# Patient Record
Sex: FEMALE | Race: WHITE | NOT HISPANIC OR LATINO | Employment: OTHER | URBAN - METROPOLITAN AREA
[De-identification: names, ages, dates, MRNs, and addresses within clinical notes are randomized per-mention and may not be internally consistent; named-entity substitution may affect disease eponyms.]

---

## 2023-04-26 ENCOUNTER — OFFICE VISIT (OUTPATIENT)
Dept: PRIMARY CARE | Facility: CLINIC | Age: 73
End: 2023-04-26
Payer: MEDICARE

## 2023-04-26 VITALS
SYSTOLIC BLOOD PRESSURE: 120 MMHG | TEMPERATURE: 96.9 F | WEIGHT: 136.4 LBS | DIASTOLIC BLOOD PRESSURE: 72 MMHG | BODY MASS INDEX: 24.55 KG/M2

## 2023-04-26 DIAGNOSIS — M25.551 HIP PAIN, RIGHT: ICD-10-CM

## 2023-04-26 DIAGNOSIS — M54.32 SCIATICA OF LEFT SIDE: Primary | ICD-10-CM

## 2023-04-26 DIAGNOSIS — S32.10XS CLOSED FRACTURE OF SACRUM, UNSPECIFIED PORTION OF SACRUM, SEQUELA: ICD-10-CM

## 2023-04-26 PROCEDURE — 99213 OFFICE O/P EST LOW 20 MIN: CPT | Performed by: INTERNAL MEDICINE

## 2023-04-26 PROCEDURE — 1036F TOBACCO NON-USER: CPT | Performed by: INTERNAL MEDICINE

## 2023-04-26 PROCEDURE — 1160F RVW MEDS BY RX/DR IN RCRD: CPT | Performed by: INTERNAL MEDICINE

## 2023-04-26 PROCEDURE — 1159F MED LIST DOCD IN RCRD: CPT | Performed by: INTERNAL MEDICINE

## 2023-04-26 RX ORDER — PHENOL 1.4 %
1 AEROSOL, SPRAY (ML) MUCOUS MEMBRANE DAILY
COMMUNITY
Start: 2019-08-27

## 2023-04-26 RX ORDER — VIT C/E/ZN/COPPR/LUTEIN/ZEAXAN 250MG-90MG
1 CAPSULE ORAL DAILY
COMMUNITY
Start: 2019-02-11

## 2023-04-26 RX ORDER — FLUTICASONE PROPIONATE 50 MCG
SPRAY, SUSPENSION (ML) NASAL DAILY PRN
COMMUNITY
Start: 2013-09-20

## 2023-04-26 NOTE — PROGRESS NOTES
Subjective   Patient ID: Lauren Fields is a 73 y.o. female who presents for Hip Pain.    HPI   Ongoing issues with pain left low back left leg as well as right hip pain.  Working with orthopedist who feels she has significant arthritis in the hip.  Improved with corticosteroid injection in the hip but only lasted for less than a week.  Hip orthopedic suggest waiting 1 year for follow-up, no surgery at this point.  Ongoing issues with left leg sciatic type pain.  Perhaps some weakness developing.  Back surgeon feels right hip pain is actually related to her back.  Recommending surgery.  She has questions regarding this.  Review of Systems    Objective   /72   Temp 36.1 °C (96.9 °F)   Wt 61.9 kg (136 lb 6.4 oz)   BMI 24.55 kg/m²     Physical Exam    Assessment/Plan     We reviewed hip versus back pain.  Reviewed indications for surgical intervention.  Discussed pros and cons.  Answered multiple questions.  She will follow-up with back surgeon and orthopedist.

## 2023-04-27 PROBLEM — M85.80 OSTEOPENIA: Status: ACTIVE | Noted: 2023-04-27

## 2023-04-27 PROBLEM — M54.30 SCIATICA: Status: ACTIVE | Noted: 2023-04-27

## 2023-04-27 PROBLEM — D75.89 MACROCYTOSIS: Status: ACTIVE | Noted: 2023-04-27

## 2023-04-27 PROBLEM — S32.10XA SACRAL FRACTURE (MULTI): Status: ACTIVE | Noted: 2023-04-27

## 2023-04-27 PROBLEM — R92.8 ABNORMAL MAMMOGRAM: Status: ACTIVE | Noted: 2023-04-27

## 2023-04-27 PROBLEM — M25.551 HIP PAIN, RIGHT: Status: ACTIVE | Noted: 2023-04-27

## 2023-04-27 PROBLEM — D64.9 ANEMIA: Status: ACTIVE | Noted: 2023-04-27

## 2023-04-27 PROBLEM — E78.5 HYPERLIPIDEMIA: Status: ACTIVE | Noted: 2023-04-27

## 2023-04-27 PROBLEM — E55.9 VITAMIN D DEFICIENCY: Status: ACTIVE | Noted: 2023-04-27

## 2023-08-16 ENCOUNTER — LAB (OUTPATIENT)
Dept: LAB | Facility: LAB | Age: 73
End: 2023-08-16
Payer: MEDICARE

## 2023-08-16 ENCOUNTER — OFFICE VISIT (OUTPATIENT)
Dept: PRIMARY CARE | Facility: CLINIC | Age: 73
End: 2023-08-16
Payer: MEDICARE

## 2023-08-16 VITALS
HEIGHT: 63 IN | SYSTOLIC BLOOD PRESSURE: 104 MMHG | DIASTOLIC BLOOD PRESSURE: 68 MMHG | WEIGHT: 133.8 LBS | BODY MASS INDEX: 23.71 KG/M2

## 2023-08-16 DIAGNOSIS — Z00.00 ROUTINE CHECK-UP: Primary | ICD-10-CM

## 2023-08-16 DIAGNOSIS — B00.9 HSV INFECTION: ICD-10-CM

## 2023-08-16 DIAGNOSIS — Z00.00 ROUTINE CHECK-UP: ICD-10-CM

## 2023-08-16 DIAGNOSIS — D75.89 MACROCYTOSIS: ICD-10-CM

## 2023-08-16 LAB
ALANINE AMINOTRANSFERASE (SGPT) (U/L) IN SER/PLAS: 21 U/L (ref 7–45)
ANION GAP IN SER/PLAS: 11 MMOL/L (ref 10–20)
CALCIUM (MG/DL) IN SER/PLAS: 9.6 MG/DL (ref 8.6–10.3)
CARBON DIOXIDE, TOTAL (MMOL/L) IN SER/PLAS: 28 MMOL/L (ref 21–32)
CHLORIDE (MMOL/L) IN SER/PLAS: 104 MMOL/L (ref 98–107)
CHOLESTEROL (MG/DL) IN SER/PLAS: 267 MG/DL (ref 0–199)
CHOLESTEROL IN HDL (MG/DL) IN SER/PLAS: 74.5 MG/DL
CHOLESTEROL/HDL RATIO: 3.6
CREATININE (MG/DL) IN SER/PLAS: 0.77 MG/DL (ref 0.5–1.05)
ERYTHROCYTE DISTRIBUTION WIDTH (RATIO) BY AUTOMATED COUNT: 12.4 % (ref 11.5–14.5)
ERYTHROCYTE MEAN CORPUSCULAR HEMOGLOBIN CONCENTRATION (G/DL) BY AUTOMATED: 32.8 G/DL (ref 32–36)
ERYTHROCYTE MEAN CORPUSCULAR VOLUME (FL) BY AUTOMATED COUNT: 93 FL (ref 80–100)
ERYTHROCYTES (10*6/UL) IN BLOOD BY AUTOMATED COUNT: 4.37 X10E12/L (ref 4–5.2)
GFR FEMALE: 81 ML/MIN/1.73M2
GLUCOSE (MG/DL) IN SER/PLAS: 87 MG/DL (ref 74–99)
HEMATOCRIT (%) IN BLOOD BY AUTOMATED COUNT: 40.6 % (ref 36–46)
HEMOGLOBIN (G/DL) IN BLOOD: 13.3 G/DL (ref 12–16)
LDL: 165 MG/DL (ref 0–99)
LEUKOCYTES (10*3/UL) IN BLOOD BY AUTOMATED COUNT: 6.3 X10E9/L (ref 4.4–11.3)
PLATELETS (10*3/UL) IN BLOOD AUTOMATED COUNT: 277 X10E9/L (ref 150–450)
POTASSIUM (MMOL/L) IN SER/PLAS: 4.6 MMOL/L (ref 3.5–5.3)
SODIUM (MMOL/L) IN SER/PLAS: 138 MMOL/L (ref 136–145)
TRIGLYCERIDE (MG/DL) IN SER/PLAS: 140 MG/DL (ref 0–149)
UREA NITROGEN (MG/DL) IN SER/PLAS: 13 MG/DL (ref 6–23)
VLDL: 28 MG/DL (ref 0–40)

## 2023-08-16 PROCEDURE — 83036 HEMOGLOBIN GLYCOSYLATED A1C: CPT

## 2023-08-16 PROCEDURE — 85027 COMPLETE CBC AUTOMATED: CPT

## 2023-08-16 PROCEDURE — 84460 ALANINE AMINO (ALT) (SGPT): CPT

## 2023-08-16 PROCEDURE — 80061 LIPID PANEL: CPT

## 2023-08-16 PROCEDURE — 1160F RVW MEDS BY RX/DR IN RCRD: CPT | Performed by: INTERNAL MEDICINE

## 2023-08-16 PROCEDURE — 80048 BASIC METABOLIC PNL TOTAL CA: CPT

## 2023-08-16 PROCEDURE — 1170F FXNL STATUS ASSESSED: CPT | Performed by: INTERNAL MEDICINE

## 2023-08-16 PROCEDURE — 99397 PER PM REEVAL EST PAT 65+ YR: CPT | Performed by: INTERNAL MEDICINE

## 2023-08-16 PROCEDURE — 36415 COLL VENOUS BLD VENIPUNCTURE: CPT

## 2023-08-16 PROCEDURE — 1036F TOBACCO NON-USER: CPT | Performed by: INTERNAL MEDICINE

## 2023-08-16 PROCEDURE — G0439 PPPS, SUBSEQ VISIT: HCPCS | Performed by: INTERNAL MEDICINE

## 2023-08-16 PROCEDURE — 1159F MED LIST DOCD IN RCRD: CPT | Performed by: INTERNAL MEDICINE

## 2023-08-16 RX ORDER — ACETAMINOPHEN 500 MG
TABLET ORAL
COMMUNITY
End: 2023-11-22

## 2023-08-16 RX ORDER — CARBOXYMETHYLCELLULOSE SODIUM
1 DROPS OPHTHALMIC (EYE) 3 TIMES DAILY
COMMUNITY

## 2023-08-16 RX ORDER — DICLOFENAC SODIUM 10 MG/G
4 GEL TOPICAL 4 TIMES DAILY PRN
COMMUNITY

## 2023-08-16 RX ORDER — VALACYCLOVIR HYDROCHLORIDE 500 MG/1
500 TABLET, FILM COATED ORAL 2 TIMES DAILY
Qty: 30 TABLET | Refills: 0 | Status: SHIPPED | OUTPATIENT
Start: 2023-08-16 | End: 2023-08-19

## 2023-08-16 ASSESSMENT — ACTIVITIES OF DAILY LIVING (ADL)
DOING_HOUSEWORK: INDEPENDENT
BATHING: INDEPENDENT
MANAGING_FINANCES: INDEPENDENT
TAKING_MEDICATION: INDEPENDENT
DRESSING: INDEPENDENT
GROCERY_SHOPPING: INDEPENDENT

## 2023-08-16 ASSESSMENT — PATIENT HEALTH QUESTIONNAIRE - PHQ9
2. FEELING DOWN, DEPRESSED OR HOPELESS: NOT AT ALL
SUM OF ALL RESPONSES TO PHQ9 QUESTIONS 1 AND 2: 0
1. LITTLE INTEREST OR PLEASURE IN DOING THINGS: NOT AT ALL
1. LITTLE INTEREST OR PLEASURE IN DOING THINGS: NOT AT ALL
SUM OF ALL RESPONSES TO PHQ9 QUESTIONS 1 AND 2: 0
2. FEELING DOWN, DEPRESSED OR HOPELESS: NOT AT ALL

## 2023-08-16 NOTE — PROGRESS NOTES
"Subjective   Reason for Visit: Lauren Fields is an 73 y.o. female here for a Medicare Wellness visit.     Past Medical, Surgical, and Family History reviewed and updated in chart.    Reviewed all medications by prescribing practitioner or clinical pharmacist (such as prescriptions, OTCs, herbal therapies and supplements) and documented in the medical record.    HPI  Overall well.  Status post uncomplicated lumbar spine surgery.  Slowly improving.  Still with some left leg paresthesias.  Additionally, right hip issues and right trochanteric bursitis issues remain problematic.  Working with orthopedist.  Patient Care Team:  Ann Morgan MD as PCP - General  Ann Morgan MD as PCP - [employer] PCP     Review of Systems   All other systems reviewed and are negative.      Objective   Vitals:  /68   Ht 1.588 m (5' 2.5\")   Wt 60.7 kg (133 lb 12.8 oz)   BMI 24.08 kg/m²       Physical Exam  Constitutional:       General: She is not in acute distress.     Appearance: Normal appearance. She is not ill-appearing.   HENT:      Head: Normocephalic and atraumatic.      Right Ear: Tympanic membrane and ear canal normal.      Left Ear: Tympanic membrane and ear canal normal.      Nose: Nose normal.      Mouth/Throat:      Mouth: Mucous membranes are moist.      Pharynx: Oropharynx is clear.   Eyes:      General: No scleral icterus.     Extraocular Movements: Extraocular movements intact.      Conjunctiva/sclera: Conjunctivae normal.      Pupils: Pupils are equal, round, and reactive to light.   Cardiovascular:      Rate and Rhythm: Normal rate and regular rhythm.      Pulses: Normal pulses.      Heart sounds: No murmur heard.     No friction rub.   Pulmonary:      Effort: Pulmonary effort is normal.      Breath sounds: Normal breath sounds. No rhonchi or rales.   Abdominal:      General: Abdomen is flat. Bowel sounds are normal. There is no distension.      Palpations: Abdomen is soft. There is no mass.    "   Tenderness: There is no abdominal tenderness.   Musculoskeletal:      Cervical back: Normal range of motion and neck supple.      Right lower leg: No edema.      Left lower leg: No edema.   Skin:     General: Skin is warm.   Neurological:      General: No focal deficit present.      Mental Status: She is alert and oriented to person, place, and time.      Cranial Nerves: No cranial nerve deficit.   Psychiatric:         Mood and Affect: Mood normal.         Behavior: Behavior normal.         Judgment: Judgment normal.         Assessment/Plan   Problem List Items Addressed This Visit    None  #1 HSV- stable. Prn Rx  #2 lumbar spine disease-.  Uncomplicated.  Stable/doing well. f/u spine surgery.   #3 vitamin D deficiency-continue. Retest   #4 osteopenia-reviewed. Recheck bone density 2023. Vitamin D, calcium and exercise  #5 hyperlipidemia-retest.  #6 rt RC- f/u ortho  #7 hip pain- f/u ortho  #8 colon polyps- last 2020. f/u 2025

## 2023-08-17 LAB
ESTIMATED AVERAGE GLUCOSE FOR HBA1C: 123 MG/DL
HEMOGLOBIN A1C/HEMOGLOBIN TOTAL IN BLOOD: 5.9 %

## 2023-08-17 ASSESSMENT — ACTIVITIES OF DAILY LIVING (ADL)
MANAGING_FINANCES: INDEPENDENT
TAKING_MEDICATION: INDEPENDENT
GROCERY_SHOPPING: INDEPENDENT
DRESSING: INDEPENDENT
DOING_HOUSEWORK: INDEPENDENT
BATHING: INDEPENDENT

## 2023-08-17 ASSESSMENT — PATIENT HEALTH QUESTIONNAIRE - PHQ9
2. FEELING DOWN, DEPRESSED OR HOPELESS: NOT AT ALL
1. LITTLE INTEREST OR PLEASURE IN DOING THINGS: NOT AT ALL
SUM OF ALL RESPONSES TO PHQ9 QUESTIONS 1 AND 2: 0

## 2023-11-22 ENCOUNTER — OFFICE VISIT (OUTPATIENT)
Dept: ORTHOPEDIC SURGERY | Facility: HOSPITAL | Age: 73
End: 2023-11-22
Payer: MEDICARE

## 2023-11-22 ENCOUNTER — HOSPITAL ENCOUNTER (OUTPATIENT)
Dept: RADIOLOGY | Facility: HOSPITAL | Age: 73
Discharge: HOME | End: 2023-11-22
Payer: MEDICARE

## 2023-11-22 DIAGNOSIS — M76.31 ILIOTIBIAL BAND SYNDROME OF RIGHT SIDE: Primary | ICD-10-CM

## 2023-11-22 DIAGNOSIS — M25.561 RIGHT KNEE PAIN, UNSPECIFIED CHRONICITY: ICD-10-CM

## 2023-11-22 DIAGNOSIS — M67.951 TENDINOPATHY OF RIGHT GLUTEUS MEDIUS: ICD-10-CM

## 2023-11-22 PROCEDURE — 76942 ECHO GUIDE FOR BIOPSY: CPT | Performed by: STUDENT IN AN ORGANIZED HEALTH CARE EDUCATION/TRAINING PROGRAM

## 2023-11-22 PROCEDURE — 99213 OFFICE O/P EST LOW 20 MIN: CPT | Performed by: STUDENT IN AN ORGANIZED HEALTH CARE EDUCATION/TRAINING PROGRAM

## 2023-11-22 PROCEDURE — 1125F AMNT PAIN NOTED PAIN PRSNT: CPT | Performed by: STUDENT IN AN ORGANIZED HEALTH CARE EDUCATION/TRAINING PROGRAM

## 2023-11-22 PROCEDURE — 1160F RVW MEDS BY RX/DR IN RCRD: CPT | Performed by: STUDENT IN AN ORGANIZED HEALTH CARE EDUCATION/TRAINING PROGRAM

## 2023-11-22 PROCEDURE — 1036F TOBACCO NON-USER: CPT | Performed by: STUDENT IN AN ORGANIZED HEALTH CARE EDUCATION/TRAINING PROGRAM

## 2023-11-22 PROCEDURE — 99213 OFFICE O/P EST LOW 20 MIN: CPT | Mod: 25 | Performed by: STUDENT IN AN ORGANIZED HEALTH CARE EDUCATION/TRAINING PROGRAM

## 2023-11-22 PROCEDURE — 20551 NJX 1 TENDON ORIGIN/INSJ: CPT | Performed by: STUDENT IN AN ORGANIZED HEALTH CARE EDUCATION/TRAINING PROGRAM

## 2023-11-22 PROCEDURE — 73564 X-RAY EXAM KNEE 4 OR MORE: CPT | Mod: RT

## 2023-11-22 PROCEDURE — 73564 X-RAY EXAM KNEE 4 OR MORE: CPT | Mod: RIGHT SIDE | Performed by: RADIOLOGY

## 2023-11-22 PROCEDURE — 20551 NJX 1 TENDON ORIGIN/INSJ: CPT | Mod: RT | Performed by: STUDENT IN AN ORGANIZED HEALTH CARE EDUCATION/TRAINING PROGRAM

## 2023-11-22 PROCEDURE — 1159F MED LIST DOCD IN RCRD: CPT | Performed by: STUDENT IN AN ORGANIZED HEALTH CARE EDUCATION/TRAINING PROGRAM

## 2023-11-22 RX ORDER — METHYLPREDNISOLONE ACETATE 40 MG/ML
40 INJECTION, SUSPENSION INTRA-ARTICULAR; INTRALESIONAL; INTRAMUSCULAR; SOFT TISSUE ONCE
Status: DISCONTINUED | OUTPATIENT
Start: 2023-11-22 | End: 2024-03-29

## 2023-11-22 ASSESSMENT — PAIN - FUNCTIONAL ASSESSMENT: PAIN_FUNCTIONAL_ASSESSMENT: 0-10

## 2023-11-22 ASSESSMENT — PAIN SCALES - GENERAL: PAINLEVEL_OUTOF10: 4

## 2023-12-11 ENCOUNTER — TELEPHONE (OUTPATIENT)
Dept: PRIMARY CARE | Facility: CLINIC | Age: 73
End: 2023-12-11
Payer: MEDICARE

## 2023-12-11 NOTE — TELEPHONE ENCOUNTER
Pt left a msg asking for a refill of her Valacyclovir 500 mg.  It is listed in the old system.  [facility].

## 2023-12-12 NOTE — TELEPHONE ENCOUNTER
Sent a [employer] message to pt, asking for directions on how she's takes it, before can be refilled

## 2023-12-13 ENCOUNTER — EVALUATION (OUTPATIENT)
Dept: PHYSICAL THERAPY | Facility: CLINIC | Age: 73
End: 2023-12-13
Payer: MEDICARE

## 2023-12-13 DIAGNOSIS — M67.951 TENDINOPATHY OF RIGHT GLUTEUS MEDIUS: ICD-10-CM

## 2023-12-13 DIAGNOSIS — M76.31 ILIOTIBIAL BAND SYNDROME OF RIGHT SIDE: ICD-10-CM

## 2023-12-13 PROCEDURE — 97140 MANUAL THERAPY 1/> REGIONS: CPT | Mod: GP | Performed by: PHYSICAL THERAPIST

## 2023-12-13 PROCEDURE — 97161 PT EVAL LOW COMPLEX 20 MIN: CPT | Mod: GP | Performed by: PHYSICAL THERAPIST

## 2023-12-13 ASSESSMENT — ENCOUNTER SYMPTOMS
OCCASIONAL FEELINGS OF UNSTEADINESS: 0
DEPRESSION: 0
LOSS OF SENSATION IN FEET: 0
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
## 2023-12-14 DIAGNOSIS — B00.9 HERPES SIMPLEX: ICD-10-CM

## 2023-12-15 DIAGNOSIS — E78.5 HYPERLIPIDEMIA, UNSPECIFIED HYPERLIPIDEMIA TYPE: Primary | ICD-10-CM

## 2023-12-15 DIAGNOSIS — R73.09 ELEVATED GLUCOSE: ICD-10-CM

## 2023-12-15 RX ORDER — VALACYCLOVIR HYDROCHLORIDE 500 MG/1
500 TABLET, FILM COATED ORAL DAILY
Qty: 30 TABLET | Refills: 5 | Status: SHIPPED | OUTPATIENT
Start: 2023-12-15 | End: 2024-06-12

## 2023-12-19 ENCOUNTER — APPOINTMENT (OUTPATIENT)
Dept: ORTHOPEDIC SURGERY | Facility: HOSPITAL | Age: 73
End: 2023-12-19
Payer: MEDICARE

## 2023-12-22 ENCOUNTER — LAB (OUTPATIENT)
Dept: LAB | Facility: LAB | Age: 73
End: 2023-12-22
Payer: MEDICARE

## 2023-12-22 DIAGNOSIS — R73.09 ELEVATED GLUCOSE: ICD-10-CM

## 2023-12-22 DIAGNOSIS — E78.5 HYPERLIPIDEMIA, UNSPECIFIED HYPERLIPIDEMIA TYPE: ICD-10-CM

## 2023-12-22 LAB
ALT SERPL W P-5'-P-CCNC: 19 U/L (ref 7–45)
CHOLEST SERPL-MCNC: 264 MG/DL (ref 0–199)
CHOLESTEROL/HDL RATIO: 3.1
HDLC SERPL-MCNC: 85 MG/DL
LDLC SERPL CALC-MCNC: 162 MG/DL
NON HDL CHOLESTEROL: 179 MG/DL (ref 0–149)
TRIGL SERPL-MCNC: 83 MG/DL (ref 0–149)
TSH SERPL-ACNC: 1.51 MIU/L (ref 0.44–3.98)
VLDL: 17 MG/DL (ref 0–40)

## 2023-12-22 PROCEDURE — 84460 ALANINE AMINO (ALT) (SGPT): CPT

## 2023-12-22 PROCEDURE — 80061 LIPID PANEL: CPT

## 2023-12-22 PROCEDURE — 36415 COLL VENOUS BLD VENIPUNCTURE: CPT

## 2023-12-22 PROCEDURE — 83036 HEMOGLOBIN GLYCOSYLATED A1C: CPT

## 2023-12-22 PROCEDURE — 84443 ASSAY THYROID STIM HORMONE: CPT

## 2023-12-23 LAB
EST. AVERAGE GLUCOSE BLD GHB EST-MCNC: 120 MG/DL
HBA1C MFR BLD: 5.8 %

## 2024-01-04 ENCOUNTER — TREATMENT (OUTPATIENT)
Dept: PHYSICAL THERAPY | Facility: HOSPITAL | Age: 74
End: 2024-01-04

## 2024-01-04 DIAGNOSIS — M76.31 ILIOTIBIAL BAND SYNDROME OF RIGHT SIDE: Primary | ICD-10-CM

## 2024-01-04 DIAGNOSIS — M67.951 TENDINOPATHY OF RIGHT GLUTEUS MEDIUS: ICD-10-CM

## 2024-01-04 PROCEDURE — 4200000004 HC PT PHASE II 15 MIN CHG: Mod: GP | Performed by: PHYSICAL THERAPIST

## 2024-01-04 ASSESSMENT — PAIN - FUNCTIONAL ASSESSMENT: PAIN_FUNCTIONAL_ASSESSMENT: 0-10

## 2024-01-04 ASSESSMENT — PAIN SCALES - GENERAL: PAINLEVEL_OUTOF10: 5 - MODERATE PAIN

## 2024-01-04 NOTE — PROGRESS NOTES
Phase II Visit - Rehabilitation Services    Patient Name: Lauren Fields  MRN: [id number]  Today's Date: 1/4/2024         Visit number: 1      Current Problem:  Patient is being seen at [facility] for cash-based (phase II) visit for: radial pulse wave treatment for R lateral hip and thigh pain.        Subjective:  Lauren states that she has been having pain starting in her R lateral hip and extending down her R thigh and into her R groin for the over a year now.  She has not been able to participate in hiking or biking as she would like d/t the pain.  Dr. Chen referred her to shockwave treatment.        Objective:  TTP R ITB and glut min/glut med        Treatment Performed:  Radial pulse wave 21, 4.0 bar, 4000 pulses to R mid to distal ITB and 21, 5.0 bar, 3000 pulses to R lateral gluts and proximal ITB        Assessment:  Pt tolerated treatment without any adverse events; Several symptomatic points found throughout IT band and glut med        Plan:  Cont 2 treatments every 7 days            Marija Gillette, PT

## 2024-01-11 ENCOUNTER — TREATMENT (OUTPATIENT)
Dept: PHYSICAL THERAPY | Facility: HOSPITAL | Age: 74
End: 2024-01-11

## 2024-01-11 DIAGNOSIS — M76.31 ILIOTIBIAL BAND SYNDROME OF RIGHT SIDE: Primary | ICD-10-CM

## 2024-01-11 DIAGNOSIS — M67.951 TENDINOPATHY OF RIGHT GLUTEUS MEDIUS: ICD-10-CM

## 2024-01-11 PROCEDURE — 4200000004 HC PT PHASE II 15 MIN CHG: Mod: GP | Performed by: PHYSICAL THERAPIST

## 2024-01-11 ASSESSMENT — PAIN SCALES - GENERAL: PAINLEVEL_OUTOF10: 4

## 2024-01-11 ASSESSMENT — PAIN - FUNCTIONAL ASSESSMENT: PAIN_FUNCTIONAL_ASSESSMENT: 0-10

## 2024-01-12 ENCOUNTER — TELEPHONE (OUTPATIENT)
Dept: PRIMARY CARE | Facility: CLINIC | Age: 74
End: 2024-01-12
Payer: MEDICARE

## 2024-01-12 DIAGNOSIS — U07.1 COVID-19: Primary | ICD-10-CM

## 2024-01-12 NOTE — PROGRESS NOTES
Phase II Visit - Rehabilitation Services    Patient Name: Lauren Fields  MRN: [id number]  Today's Date: 1/12/2024         Visit number: 2      Current Problem:  Patient is being seen at [facility] for cash-based (phase II) visit for: radial pulse wave treatment for R lateral hip and thigh pain.        Subjective:  Lauren states that pain in her distal thigh and knee has improved.  She states she is feeling more lateral hip and medial thigh pain today.  She has scheduled a follow up with Dr. Caicedo following her 3rd shockwave treatment as Dr. Chen will be on leave.        Objective:  TTP R ITB and glut min/glut med        Treatment Performed:  Radial pulse wave 21, 4.0 bar, 4000 pulses to R mid to distal ITB and 21, 5.0 bar, 3000 pulses to R lateral gluts and proximal ITB        Assessment:  Pt tolerated treatment without any adverse events; Several symptomatic points found throughout IT band and glut med        Plan:  Cont 1 treatment in 7 days            Marija Gillette, PT

## 2024-01-12 NOTE — TELEPHONE ENCOUNTER
Patient called, tested pos for Covid today, s/sx started Sun, 1/7/24 - has nasal congestion, runny nose, sometimes with a fever up to 100.4, fatigue.      Wants to know if she could get Paxlovid?  Other tx suggestions?  Feels like she is getting worse as the days go by.     Pharmacy is [facility] in [place].

## 2024-01-18 ENCOUNTER — TREATMENT (OUTPATIENT)
Dept: PHYSICAL THERAPY | Facility: HOSPITAL | Age: 74
End: 2024-01-18

## 2024-01-18 DIAGNOSIS — M67.951 TENDINOPATHY OF RIGHT GLUTEUS MEDIUS: ICD-10-CM

## 2024-01-18 DIAGNOSIS — M76.31 ILIOTIBIAL BAND SYNDROME OF RIGHT SIDE: Primary | ICD-10-CM

## 2024-01-18 PROCEDURE — 4200000004 HC PT PHASE II 15 MIN CHG: Mod: GP | Performed by: PHYSICAL THERAPIST

## 2024-01-18 ASSESSMENT — PAIN - FUNCTIONAL ASSESSMENT: PAIN_FUNCTIONAL_ASSESSMENT: 0-10

## 2024-01-18 ASSESSMENT — PAIN SCALES - GENERAL: PAINLEVEL_OUTOF10: 5 - MODERATE PAIN

## 2024-01-18 NOTE — PROGRESS NOTES
Phase II Visit - Rehabilitation Services    Patient Name: Lauren Fields  MRN: [id number]  Today's Date: 1/18/2024  Time Calculation  Start Time: 0130  Stop Time: 0150  Time Calculation (min): 20 min      Visit number: 3      Current Problem:  Patient is being seen at [facility] for cash-based (phase II) visit for: radial pulse wave treatment for R lateral hip and thigh pain.        Subjective:  Lauren states that her hip was more sore after last treatment session, lasting for about a day.  She states that she is feeling more R groin pain and relying on her L leg for gait more.        Objective:  TTP R ITB and glut min/glut med        Treatment Performed:  Radial pulse wave 21, 3.5 bar, 4000 pulses to R mid to distal ITB and 21, 4.0 bar, 3000 pulses to R lateral gluts and proximal ITB        Assessment:  Pt tolerated treatment without any adverse events; More symptomatic points perceived in distal ITB than lateral hip.        Plan:  F/U with Dr. Caicedo next week to determine next steps            Marija Gillette, PT

## 2024-01-22 ASSESSMENT — ENCOUNTER SYMPTOMS
VISUAL CHANGE: 0
SPEECH DIFFICULTY: 0
DIZZINESS: 0
NERVOUS/ANXIOUS: 0
POLYDIPSIA: 0
FATIGUE: 0
BLURRED VISION: 0
HUNGER: 0
WEIGHT LOSS: 0
TREMORS: 0
HEADACHES: 0
CONFUSION: 0
POLYPHAGIA: 0
SWEATS: 0
WEAKNESS: 0
BLACKOUTS: 0

## 2024-01-23 ENCOUNTER — OFFICE VISIT (OUTPATIENT)
Dept: ORTHOPEDIC SURGERY | Facility: CLINIC | Age: 74
End: 2024-01-23
Payer: MEDICARE

## 2024-01-23 DIAGNOSIS — M16.11 ARTHRITIS OF RIGHT HIP: Primary | ICD-10-CM

## 2024-01-23 DIAGNOSIS — M67.951 TENDINOPATHY OF RIGHT GLUTEUS MEDIUS: ICD-10-CM

## 2024-01-23 DIAGNOSIS — Z98.1 HISTORY OF LUMBAR FUSION: ICD-10-CM

## 2024-01-23 DIAGNOSIS — M76.31 ILIOTIBIAL BAND SYNDROME OF RIGHT SIDE: ICD-10-CM

## 2024-01-23 PROCEDURE — 1157F ADVNC CARE PLAN IN RCRD: CPT | Performed by: FAMILY MEDICINE

## 2024-01-23 PROCEDURE — 1160F RVW MEDS BY RX/DR IN RCRD: CPT | Performed by: FAMILY MEDICINE

## 2024-01-23 PROCEDURE — 1125F AMNT PAIN NOTED PAIN PRSNT: CPT | Performed by: FAMILY MEDICINE

## 2024-01-23 PROCEDURE — 1036F TOBACCO NON-USER: CPT | Performed by: FAMILY MEDICINE

## 2024-01-23 PROCEDURE — 99214 OFFICE O/P EST MOD 30 MIN: CPT | Performed by: FAMILY MEDICINE

## 2024-01-23 PROCEDURE — 1159F MED LIST DOCD IN RCRD: CPT | Performed by: FAMILY MEDICINE

## 2024-01-23 NOTE — LETTER
January 23, 2024     Tyler Salzaar DO  [address]  [address] [place] [id number]    Patient: Lauren Fields   YOB: 1950   Date of Visit: 1/23/2024       Dear Dr. Tyler Salazar DO:    Thank you for referring Lauren Fields to me for evaluation. Below are my notes for this consultation.  If you have questions, please do not hesitate to call me. I look forward to following your patient along with you.       Sincerely,     Prabhakar Caicedo DO      CC: No Recipients  ______________________________________________________________________________________    ** Please excuse any errors in grammar or translation related to this dictation. Voice recognition software was utilized to prepare this document. **    Assessment & Plan:  Lauren is a very pleasant 73-year-old patient presenting with right-sided lower extremity pain.  She has multiple areas of pain that I feel are separate entities and not referred sides of pain from a single origin.  In regards to her right groin and medial thigh pain this is most likely attributed to her advanced arthritis.  She reports having a steroid injection completed a year ago that was not helpful for more than a few days.  Offered to repeat this today which she declined.  Recommend she follow-up with Dr. Salazar if considering hip replacement.  In regards to her reported back pain, do not suspect this is related to her hips is more likely be attributed to her previous degenerative condition that has since had surgery.  Recommend she follow-up with a spinal surgeon who performed this procedure.  In regards to her distal thigh pain, agree with Dr. Chen assessment of this being attributed to IT band syndrome.  She is too soon for repeat steroid injection.  She states that she is not doing any specific exercises at this time for this and as such was provided a home exercise handout that demonstrated IT band stretches as well as hip muscular strengthening exercises.  In regards  to the shockwave therapy, discussed with patient that typical course of this is 6 sessions however if she feels that she has not had any response after 3 she could consider discontinuing them.  Finally in regards to her lateral right hip pain while this may be compounded by her right hip arthritis, the site of the pain is more attributable to the gluteus medius tendinopathy.  This can be addressed with repeat trochanteric bursa injections as well as a hip muscular strengthening program.  Today patient declines steroid injections.  She plans to follow-up with Dr. Salazar for reassessment.  Patient can follow-up with myself or Dr. Chen when she returns to discuss nonsurgical management of the above conditions.  All questions answered and patient agrees to plan.    Chief complaint:  Right leg pain    HPI:  73-year-old female, hx of lumbar fusion in May 2023,  presents with multiple plates in relation to her right leg.  She has previously been under the care of Dr. Chen and Dr. Salazar for these conditions.  She saw Dr. Salazar in August 2023 and had been discussed for hip replacement surgery however declined at that time.  She was referred to Dr. Chen from their for nonoperative management of her leg pain.  Since that time she has received trochanteric bursa steroid injection as well as an distal IT band injection.  Recently she has underwent 3 sessions of shockwave therapy to address her IT band spasticity which she reports have not improved her condition.  In addition to the pain she feels along the distal aspect of her thigh is now experiencing more pain within her right groin radiating along medial thigh as well as into her lower and mid back.    Exam:  RIGHT Hip Exam:  Normal gait  No warmth, erythema or ecchymosis overlying.  Active flexion >90 degrees with grossly normal extension, abduction, adduction, IR and ER.  TTP over glute tendon  5/5 strength of hip flexion, abduction, & adduction  SILT  -log  roll pain,-FADIR pain,-JANEY pain,-Stinchfield,-scour  -resisted external derotation test    RIGHT Knee Exam:  Normal gait  No effusion, ecchymosis, erythema  TTP over distal IT band  Flexion to 130 [id number], Extension to 0 of 0deg   5/5 strength of knee flexion and extension    Results:  X-rays of right knee obtained 11/20/2023 reviewed and interpreted as normal appearing without significant degenerative change.  X-rays of right hip obtained 8/8/2023 reviewed and independent interpreted as moderate advanced degenerative changes.  Reviewed previous encounters with Dr. Chen and Dr. Salazar dating back to August 2023.

## 2024-01-24 ENCOUNTER — OFFICE VISIT (OUTPATIENT)
Dept: PRIMARY CARE | Facility: CLINIC | Age: 74
End: 2024-01-24
Payer: MEDICARE

## 2024-01-24 VITALS
TEMPERATURE: 97.4 F | DIASTOLIC BLOOD PRESSURE: 70 MMHG | BODY MASS INDEX: 24.08 KG/M2 | WEIGHT: 133.8 LBS | SYSTOLIC BLOOD PRESSURE: 104 MMHG

## 2024-01-24 DIAGNOSIS — E78.5 HYPERLIPIDEMIA, UNSPECIFIED HYPERLIPIDEMIA TYPE: Primary | ICD-10-CM

## 2024-01-24 DIAGNOSIS — M70.61 TROCHANTERIC BURSITIS, RIGHT HIP: ICD-10-CM

## 2024-01-24 DIAGNOSIS — M67.951 TENDINOPATHY OF RIGHT GLUTEUS MEDIUS: ICD-10-CM

## 2024-01-24 DIAGNOSIS — M54.32 SCIATICA OF LEFT SIDE: ICD-10-CM

## 2024-01-24 DIAGNOSIS — E55.9 VITAMIN D DEFICIENCY: ICD-10-CM

## 2024-01-24 DIAGNOSIS — M85.80 OSTEOPENIA, UNSPECIFIED LOCATION: ICD-10-CM

## 2024-01-24 DIAGNOSIS — R73.01 IMPAIRED FASTING BLOOD SUGAR: ICD-10-CM

## 2024-01-24 PROCEDURE — 1157F ADVNC CARE PLAN IN RCRD: CPT | Performed by: INTERNAL MEDICINE

## 2024-01-24 PROCEDURE — 1158F ADVNC CARE PLAN TLK DOCD: CPT | Performed by: INTERNAL MEDICINE

## 2024-01-24 PROCEDURE — 1123F ACP DISCUSS/DSCN MKR DOCD: CPT | Performed by: INTERNAL MEDICINE

## 2024-01-24 PROCEDURE — 1159F MED LIST DOCD IN RCRD: CPT | Performed by: INTERNAL MEDICINE

## 2024-01-24 PROCEDURE — 1160F RVW MEDS BY RX/DR IN RCRD: CPT | Performed by: INTERNAL MEDICINE

## 2024-01-24 PROCEDURE — 1036F TOBACCO NON-USER: CPT | Performed by: INTERNAL MEDICINE

## 2024-01-24 PROCEDURE — 1125F AMNT PAIN NOTED PAIN PRSNT: CPT | Performed by: INTERNAL MEDICINE

## 2024-01-24 PROCEDURE — 99214 OFFICE O/P EST MOD 30 MIN: CPT | Performed by: INTERNAL MEDICINE

## 2024-01-24 ASSESSMENT — ENCOUNTER SYMPTOMS
CONFUSION: 0
HEADACHES: 0
FATIGUE: 0
WEAKNESS: 0
POLYDIPSIA: 0
NERVOUS/ANXIOUS: 0
TREMORS: 0
DIZZINESS: 0
SPEECH DIFFICULTY: 0
POLYPHAGIA: 0

## 2024-01-24 ASSESSMENT — PATIENT HEALTH QUESTIONNAIRE - PHQ9
2. FEELING DOWN, DEPRESSED OR HOPELESS: NOT AT ALL
SUM OF ALL RESPONSES TO PHQ9 QUESTIONS 1 AND 2: 0
1. LITTLE INTEREST OR PLEASURE IN DOING THINGS: NOT AT ALL

## 2024-01-24 NOTE — PROGRESS NOTES
Subjective   Reason for Visit: Lauren Fields is an 73 y.o. female here for f/u    Past Medical, Surgical, and Family History reviewed and updated in chart.    Reviewed all medications by prescribing practitioner or clinical pharmacist (such as prescriptions, OTCs, herbal therapies and supplements) and documented in the medical record.      Overall well.  Status post uncomplicated lumbar spine surgery.  Slowly improving.  Still with some left leg paresthesias.  Additionally, right hip issues and right trochanteric bursitis issues remain problematic.  Working with orthopedist.  Increasing activity.  However, ongoing issues with right-sided hip pain.  Working with sports medicine.  Overall diet is good.  Energy good..  Mood is good.  Patient Care Team:  Ann Morgan MD as PCP - General  Ann Morgna MD as PCP - [employer] PCP     Review of Systems   Constitutional:  Negative for fatigue.   Cardiovascular:  Negative for chest pain.   Endocrine: Positive for polyuria. Negative for polydipsia and polyphagia.   Skin:  Negative for pallor.   Neurological:  Negative for dizziness, tremors, speech difficulty, weakness and headaches.   Psychiatric/Behavioral:  Negative for confusion. The patient is not nervous/anxious.    All other systems reviewed and are negative.      Objective   Vitals:  /70   Temp 36.3 °C (97.4 °F)   Wt 60.7 kg (133 lb 12.8 oz)   BMI 24.08 kg/m²       Physical Exam  Constitutional:       General: She is not in acute distress.     Appearance: Normal appearance. She is not ill-appearing.   HENT:      Head: Normocephalic and atraumatic.      Right Ear: Tympanic membrane and ear canal normal.      Left Ear: Tympanic membrane and ear canal normal.      Nose: Nose normal.      Mouth/Throat:      Mouth: Mucous membranes are moist.      Pharynx: Oropharynx is clear.   Eyes:      General: No scleral icterus.     Extraocular Movements: Extraocular movements intact.       Conjunctiva/sclera: Conjunctivae normal.      Pupils: Pupils are equal, round, and reactive to light.   Cardiovascular:      Rate and Rhythm: Normal rate and regular rhythm.      Pulses: Normal pulses.      Heart sounds: No murmur heard.     No friction rub.   Pulmonary:      Effort: Pulmonary effort is normal.      Breath sounds: Normal breath sounds. No rhonchi or rales.   Abdominal:      General: Abdomen is flat. Bowel sounds are normal. There is no distension.      Palpations: Abdomen is soft. There is no mass.      Tenderness: There is no abdominal tenderness.   Musculoskeletal:      Cervical back: Normal range of motion and neck supple.      Right lower leg: No edema.      Left lower leg: No edema.   Skin:     General: Skin is warm.   Neurological:      General: No focal deficit present.      Mental Status: She is alert and oriented to person, place, and time.      Cranial Nerves: No cranial nerve deficit.   Psychiatric:         Mood and Affect: Mood normal.         Behavior: Behavior normal.         Judgment: Judgment normal.       Lab Results   Component Value Date    WBC 6.3 08/16/2023    HGB 13.3 08/16/2023    HCT 40.6 08/16/2023     08/16/2023    CHOL 264 (H) 12/22/2023    TRIG 83 12/22/2023    HDL 85.0 12/22/2023    ALT 19 12/22/2023    AST 21 02/11/2019     08/16/2023    K 4.6 08/16/2023     08/16/2023    CREATININE 0.77 08/16/2023    BUN 13 08/16/2023    CO2 28 08/16/2023    TSH 1.51 12/22/2023    HGBA1C 5.8 (H) 12/22/2023         Assessment/Plan   Problem List Items Addressed This Visit       Hyperlipidemia - Primary    Osteopenia    Sciatica    Vitamin D deficiency    Tendinopathy of right gluteus medius    Trochanteric bursitis, right hip    Impaired fasting blood sugar   #1 HSV- stable. Prn Rx  #2 lumbar spine disease- Uncomplicated.  Stable/doing well. f/u spine surgery.   #3 vitamin D deficiency-continue.    #4 osteopenia-reviewed. Recheck bone density next visit. Vitamin D,  calcium and exercise  #5 hyperlipidemia-elevated LDL.  ASCVD risk score~10%. CACS=0.  Reviewed pros and cons of treatment.  I would lean towards treating at this point.  Reviewed that with patient.  She  understands but declines.  #6 rt RC- f/u ortho  #7 hip pain- f/u ortho  #8 colon polyps- last 2020. f/u 2025  #9 impaired fasting blood sugar-reviewed.  Discussed low sugar/low carbohydrate diet with daily exercise.  Discussed option of metformin, declines.  Retest 6 months          Answers submitted by the patient for this visit:  Diabetes Questionnaire (Submitted on 1/22/2024)  Chief Complaint: Diabetes problem  Diabetes type: type 2  Disease duration: 5 Months  blurred vision: No  foot paresthesias: No  foot ulcerations: No  visual change: No  weight loss: No  Symptom course: stable  hunger: No  sleepiness: No  sweats: No  blackouts: No  hospitalization: No  required assistance: No  required glucagon: No  CVA: No  heart disease: No  impotence: No  nephropathy: No  peripheral neuropathy: No  PVD: No  retinopathy: No  CAD risks: dyslipidemia, family history  Current treatments: diet  Blood glucose trend: decreasing steadily  Weight trend: stable  Current diet: generally healthy  Meal planning: avoidance of concentrated sweets  Exercise: daily  Dietitian visit: No  Eye exam current: Yes  Sees podiatrist: Yes

## 2024-01-25 PROBLEM — R73.01 IMPAIRED FASTING BLOOD SUGAR: Status: ACTIVE | Noted: 2024-01-25

## 2024-01-25 PROBLEM — M25.559 ARTHRALGIA OF HIP: Status: ACTIVE | Noted: 2024-01-25

## 2024-01-25 PROBLEM — M70.61 TROCHANTERIC BURSITIS, RIGHT HIP: Status: ACTIVE | Noted: 2023-03-20

## 2024-02-13 ENCOUNTER — APPOINTMENT (OUTPATIENT)
Dept: ORTHOPEDIC SURGERY | Facility: CLINIC | Age: 74
End: 2024-02-13
Payer: MEDICARE

## 2024-02-15 ENCOUNTER — PREP FOR PROCEDURE (OUTPATIENT)
Dept: ORTHOPEDIC SURGERY | Facility: CLINIC | Age: 74
End: 2024-02-15

## 2024-02-15 ENCOUNTER — OFFICE VISIT (OUTPATIENT)
Dept: ORTHOPEDIC SURGERY | Facility: CLINIC | Age: 74
End: 2024-02-15
Payer: MEDICARE

## 2024-02-15 ENCOUNTER — HOSPITAL ENCOUNTER (OUTPATIENT)
Dept: RADIOLOGY | Facility: CLINIC | Age: 74
Discharge: HOME | End: 2024-02-15
Payer: MEDICARE

## 2024-02-15 VITALS — BODY MASS INDEX: 23.04 KG/M2 | HEIGHT: 63 IN | WEIGHT: 130 LBS

## 2024-02-15 DIAGNOSIS — M16.11 ARTHRITIS OF RIGHT HIP: ICD-10-CM

## 2024-02-15 DIAGNOSIS — M25.561 RIGHT KNEE PAIN, UNSPECIFIED CHRONICITY: ICD-10-CM

## 2024-02-15 DIAGNOSIS — M16.11 PRIMARY OSTEOARTHRITIS OF RIGHT HIP: Primary | ICD-10-CM

## 2024-02-15 DIAGNOSIS — M25.351 INSTABILITY OF HIP JOINT, RIGHT: ICD-10-CM

## 2024-02-15 PROCEDURE — 73560 X-RAY EXAM OF KNEE 1 OR 2: CPT | Mod: RT

## 2024-02-15 PROCEDURE — 1125F AMNT PAIN NOTED PAIN PRSNT: CPT | Performed by: ORTHOPAEDIC SURGERY

## 2024-02-15 PROCEDURE — 1159F MED LIST DOCD IN RCRD: CPT | Performed by: ORTHOPAEDIC SURGERY

## 2024-02-15 PROCEDURE — 1160F RVW MEDS BY RX/DR IN RCRD: CPT | Performed by: ORTHOPAEDIC SURGERY

## 2024-02-15 PROCEDURE — 99214 OFFICE O/P EST MOD 30 MIN: CPT | Performed by: ORTHOPAEDIC SURGERY

## 2024-02-15 PROCEDURE — 1036F TOBACCO NON-USER: CPT | Performed by: ORTHOPAEDIC SURGERY

## 2024-02-15 PROCEDURE — 73560 X-RAY EXAM OF KNEE 1 OR 2: CPT | Mod: RIGHT SIDE | Performed by: RADIOLOGY

## 2024-02-15 PROCEDURE — 1157F ADVNC CARE PLAN IN RCRD: CPT | Performed by: ORTHOPAEDIC SURGERY

## 2024-02-15 NOTE — PROGRESS NOTES
ORTHOPEDIC FOLLOW UP      ============================  IMPRESSION/PLAN:  ============================  Primary osteoarthritis, right hip    PLAN:  Discussed with the patient findings above.  Reviewed her current x-rays with her.  Symptomatically the vast majority of her symptoms have improved after she had a corticosteroid injection in her right hip..  At this point this pain presentation is the most limiting factor in her day-to-day activities and I do think she would be a good surgical candidate for hip replacement as she has failed conservative treatment.    Lauren Fields has radiographic and physical exam evidence of degenerative joint disease and wishes to pursue surgery. The patient appears to have sufficient symptoms to warrant surgical intervention and is an appropriate candidate for  right Total Hip Arthroplasty as evidenced by six months of unsuccessful non-operative treatment as outlined in the HPI, progressive symptoms including pain impacting sleep or causing fatigue, pain impacting work, pain worsened with weight bearing, and pain limiting ability to stay fit and healthy.     We had a lengthy discussion regarding the risk and benefit of surgery, the alternatives, limitations, and personnel involved. The include but are not limited to infection, persistent pain, instability, nerve injury, blood clots, and medical complications. We also discussed the pre-operative course, surgery itself, and rehabilitation. Perioperative blood management and transfusion issues were discussed, and options clearly outlined. The patient consented to the use of allogenic blood if medically necessary.     The patient has elected to schedule surgery at this time or intents to call the office with a surgical date. Shared decision making occurred while obtaining informed consent. The patient with be scheduled for a pre-operative education class. The patient will be ordered appropriate preoperative labs to be completed for  preadmission testing.     Robotic Assisted Surgery: Yes. The use of robotic assisted surgery was discussed with the patient.  The risk, benefits were discussed regarding this.  Patient consented for this procedure.  We discussed specifically placement of pins and arrays for navigation during surgery and to help with the robotic assistance.  We discussed the use of an additional bandage to cover the pin sites.  We also reviewed that they may have some slight pain at the placement of pin sites that should improve in the same fashion as their general recovery of the joint replacement.    We discussed the term robot, when used to describe the DARRIN system, refers to the [employer] robotic arm. The DARRIN System is not a robot, but a surgeon-controlled robotic-arm assisted device.    - Preoperative Consults: PAT Clearance   - Disposition: Admit  - Anesthesia Plan: Spinal, local  - Implants: [place] Darrin  - Physical Therapy Plan: Home  - [id number]: Yes  - Surgical Approach: Direct superior  - DVT PPx: Aspirin    Risk Assessment for Post-Op Antibiotics   - none present    I hereby indicate that these comorbidities may have a detrimental effect on this arthroplasty.   None present      Lauren Fields presents today for follow up of the above condition.  She is in the past for IT band symptoms, arthritic symptoms.  Has seen one of our nonoperative sports partners and received injections.  Majority of complaints that she has are located the groin and her intra-articular injection she had her hip resolved the symptoms for short period of time.  She did also an injection for her IT band that has helped significantly with the pain in her knee but the most limiting factor in her life is the pain from her hip and groin and the pain on the lateral aspect of the hip.  She would like to discuss further treatment options and see if she is candidate for hip replacement.    FUNCTIONAL STATUS: occasionally limited.  AMBULATORY STATUS: Independent  community ambulation without devices  PREVIOUS TREATMENTS: NSAIDS meloxicam with mild improvement  Cortisone injection in her knee 9/2023 with no improvement  HISTORY OF SURGERY ON AFFECTED KNEE(S): No     Review of Systems:   Constitutional: See HPI for pain assessment, No significant weight loss, recent trauma. Denies fevers/chills  Cardiovascular: No chest pain, shortness of breath  Respiratory: No difficulty breathing, cough  Gastrointestinal: No nausea, vomiting, diarrhea, constipation  Musculoskeletal: Noted in HPI, no arthralgias   Integumentary: No rashes, easy bruising, redness   Neurological: no numbness or tingling in extremities, no gait disturbances     Patient Active Problem List   Diagnosis    Hip pain, right    Hyperlipidemia    Macrocytosis    Osteopenia    Asymmetrical right sensorineural hearing loss    Anemia    Abnormal mammogram    Sacral fracture (CMS/HCC)    Sciatica    Vitamin D deficiency    Iliotibial band syndrome of right side    Tendinopathy of right gluteus medius    Trochanteric bursitis, right hip    Arthralgia of hip    Impaired fasting blood sugar       Past Medical History:   Diagnosis Date    Arthritis 2009    Bursitis of hip approx. 2016-17    Cervical disc disorder 2009    Lumbosacral disc disease 2009    Other allergy status, other than to drugs and biological substances     History of environmental allergies    Personal history of other diseases of the musculoskeletal system and connective tissue     History of spinal stenosis    Personal history of other infectious and parasitic diseases     History of herpes simplex infection    Rotator cuff syndrome had surgical repair 3-    Tendinitis of knee right -- 2023        Allergies   Allergen Reactions    Hydromorphone Nausea Only    Acetaminophen Rash    Amoxicillin-Pot Clavulanate Rash    Penicillins Rash        Past Surgical History:   Procedure Laterality Date    CERVICAL FUSION  01/05/2018    Cervical Vertebral Fusion     OTHER SURGICAL HISTORY  07/17/2020    Ulnar nerve neuroplasty    OTHER SURGICAL HISTORY  07/11/2022    Rotator cuff repair    OTHER SURGICAL HISTORY  07/11/2022    Decompression of spinal cord    OTHER SURGICAL HISTORY  01/27/2020    Tonsillectomy    OTHER SURGICAL HISTORY  01/27/2020    Neck surgery    OTHER SURGICAL HISTORY  01/27/2020    Upper back surgery        Family History   Problem Relation Name Age of Onset    Colon polyps Mother Denisse Fields     Dementia Mother Denisse Fields     Breast cancer Mother Denisse Fields     Dislocations Mother Denisse Fields     Osteoporosis Mother Denisse Fields     Rheumatologic disease Mother Denisse Fields     Heart disease Father      Breast cancer Mother's Sister      Diabetes Maternal Grandmother Kate Hunterto     Cancer Mother's Sister Ambreen Velasquez         Social History     Socioeconomic History    Marital status:      Spouse name: Not on file    Number of children: Not on file    Years of education: Not on file    Highest education level: Not on file   Occupational History    Not on file   Tobacco Use    Smoking status: Never     Passive exposure: Past    Smokeless tobacco: Never   Substance and Sexual Activity    Alcohol use: Yes     Alcohol/week: 1.0 standard drink of alcohol     Types: 1 Glasses of wine per week    Drug use: Never    Sexual activity: Not Currently     Birth control/protection: None   Other Topics Concern    Not on file   Social History Narrative    Not on file     Social Determinants of Health     Financial Resource Strain: Not on file   Food Insecurity: Not on file   Transportation Needs: Not on file   Physical Activity: Not on file   Stress: Not on file   Social Connections: Not on file   Intimate Partner Violence: Not on file   Housing Stability: Not on file        CURRENT MEDICATIONS:   Current Outpatient Medications   Medication Sig Dispense Refill    carboxymethylcellulose sodium (Refresh Contacts) drops OPHTHalmic solution 1 drop 3 times a day.       cholecalciferol (Vitamin D-3) 25 MCG (1000 UT) capsule Take 1 capsule (25 mcg) by mouth once daily.      diclofenac sodium (Voltaren) 1 % gel gel Apply 4.5 inches (4 g) topically 4 times a day as needed.      estrogens, conjugated, (Premarin) vaginal cream Insert into the vagina.      fluticasone (Flonase) 50 mcg/actuation nasal spray Administer into affected nostril(s) once daily.      multivitamin-min-iron-FA-vit K (Adults Multivitamin) 18 mg iron-400 mcg-25 mcg tablet Take 1 tablet by mouth once daily.      turmeric, bulk, 95 % powder Take by mouth.      valACYclovir (Valtrex) 500 mg tablet Take 1 tablet (500 mg) by mouth once daily. 30 tablet 5     Current Facility-Administered Medications   Medication Dose Route Frequency Provider Last Rate Last Admin    methylPREDNISolone acetate (DEPO-Medrol) injection 40 mg  40 mg intramuscular Once Antionette Chen MD            =================================  EXAM  =================================  GENERAL: A/Ox3, NAD. Appears healthy, well nourished  PSYCHIATRIC: Mood stable, appropriate memory recall  EYES: EOM intact, no scleral icterus  CARDIAC: regular rate  LUNGS: Breathing non-labored  SKIN: no erythema, rashes, or ecchymoses     MUSCULOSKELETAL:  Laterality: right Hip Exam  - ROM, Extension: full, no flexion contracture  - Strength: Abduction 5/5 against resitance, Flexion 5/5  - Palpation: mild TTP along greater trochanter  - Log roll/IR exam: painful and limited motion. Pain with hip flexion past 90 degrees and internal rotation  - Straight leg raise: negative  - EHL/PF/DF motor intact  - Gait: antalgic to arthritic side, negative Trendelenburg gait   - Special Tests: none performed    NEUROVASCULAR:  - Neurovascular Status: sensation intact to light touch distally  - Capillary refill brisk at extremities, Bilateral dorsalis pedis pulse 2+      Body mass index is 23.4 kg/m².      IMAGING: Previous x-rays of right hip and pelvis reviewed which demonstrate  severe arthritic changes noted with severe medial pole disease that includes joint space narrowing, subchondral sclerosis, calcification of the fat pad and large osteophytic change. X-rays were personally reviewed by me.  Radiology reports were reviewed by me as well, if available at the time.        Tyler Salazar DO  Attending Surgeon  Joint Replacement and Adult Reconstructive Surgery  [place], [place]

## 2024-02-16 PROBLEM — M16.11 PRIMARY OSTEOARTHRITIS OF RIGHT HIP: Status: ACTIVE | Noted: 2024-02-15

## 2024-03-07 NOTE — PROGRESS NOTES
Thank you for attending our Joint Replacement class today in preparation for your upcoming surgery.  Topics discussed include:    MyChart Enrollment  Communication with Care Team  My Chart is the best form of communication to reach all of your caregivers  You can send messages to specific care givers, or a care team  Continued Education  You will be enrolled in a Total Joint Replacement care plan to receive additional education before and after surgery  You can review a short recording of the class content  Access to Medical Records  You can access test results, office notes, appointments, etc.  You can connect to other healthcare systems who use [employer] ([facility], [facility], [facility], etc.)  [employer]  Program Information  Consent to Enroll    Background/Understanding of Joint Replacement Surgery  Potential for same day discharge  Any questions or concerns to be directed to the surgeon's office    How to Prepare for Surgery  Use of Nicotine Products/Smoking  Stop several weeks before surgery  Such products slow down the healing process and increase risk of post-op infection and complications  Clearance for Surgery  Medical Clearance by Specialists  Dental Clearance  Cracked/Broken/Loose teeth left untreated may postpone surgery  The importance of post-op antibiotics for dental visits per surgeon protocol  Preadmission Testing  **Potential for postponed surgery if appropriate clearance is not obtained  Medication Instruction  Follow instructions provided by the doctor who prescribes your medication (typically, but not limited to cardiologist)  Preadmission testing will provide additional instructions during your appointment on what to stop and what to take as you get closer to surgery  For clarification of these instructions, please call preadmission testing directly - [phone]  Tips for Preparing the home for discharge from the hospital  Care Partner  Requirement for surgery, the patient must have a plan to have  help at home  Potential for postponed surgery if plan for home support cannot be established  How the care partner can help after surgery  CHG Body Wash/Mouth Wash  Follow the instructions given at preadmission testing  Body wash is to be used on the body and hair for 5 washes  Mouthwash is to be used the night before and morning of surgery  **This is a system-wide protocol developed by infectious disease professionals, we will not alter our recommendations for those with sensitive skin or those who have special hair needs.  Please follow the instructions as they are written as this will provide the best infection prevention measures for surgery.  Should you have an allergy to one of the products, please discuss with your preadmission team**    What to Expect in the Hospital/At Home  Morning of Surgery NPO Guidelines  Nothing to eat after midnight  Water can be consumed up to 2 hours prior to arrival  Surgical and Post-Surgical Care Team  Surgical Team  Anesthesia Team  Nursing  Physical Therapy  Care Coordinating  Pharmacy  Hospital Arrival Instructions  Arrive at the time provided to you  Consider traffic patterns (rush-hour) based on arrival time  Have arrangements made for a ride home  If discharging same day, care partner should remain at the hospital  Recovering after Surgery  Recovery Room - Visitors are not brought back  Transition to hospital room - 2nd Floor, Visitors will be directed to your room  The presence of and strategies for controlling surgical pain and swelling  The importance of early mobility  Side effects after surgery  What to expect if staying overnight    Discharge Planning  The intended plan for discharge will be for patients to discharge home  All patients require a care partner (family, friend, neighbor, etc.) to stay with the patient for the first few nights after surgery  The inability to secure help at home will postpone surgery  Home Care Services set up per surgeon order  Physical  Therapy  Occupational Therapy  **If desired, private duty care can be arranged by the patient ahead of time**  Outpatient Physical Therapy per surgeon order    Recovering at Home  Wound Care  Follow wound care instructions found in your discharge paperwork  Bandage is water-resistant and you may shower with the bandage  Do not scrub directly over the bandage  Do not submerge in water until cleared (bathtub, hot tub, pool, etc.)    Post-Op Risk Prevention  Infection Prevention  Promptly seek treatment for any infections post-operatively  Routine dental visits must be postponed for 3 months after surgery  Your surgeon may require antibiotics prior to future dental visits  Any concerns for infection not related directly to the knee or the hip should be managed by your primary care provider  Blood Clots  Be sure to complete the course of blood thinning medication as prescribed by your surgeon  Movement every 1-2 hours during the day is encouraged to prevent blood clots  Monitor for signs of blood clots  Wear compression stockings as prescribed by your surgeon  Constipation  Constipation is common following surgery  Drink plenty of fluids  Take stool softener/laxative as prescribed by your surgeon  Move around frequently  Eat foods high in fiber  Fall Prevention  Prepare home ahead of time to clear space to move with walker  Remove throw rugs and electrical cords from walkways  Install railings near any stairways with more than 2 steps  Use night lights for increased visibility at night  Continue to use your assistive device until cleared by surgeon or physical therapy  Dislocation Prevention - Not all procedures will have dislocation precautions  Follow dislocation precautions provided by your surgeon  It is OK to resume sexual activity about 6 weeks following surgery  Be sure to follow any dislocation precautions assigned    Durable Medical Equipment  Cold Therapy  Breg Cold Therapy Machines  Ice/Gel Packs  Assistive  Devices  Folding Walker with Wheels (in the front only)  No Rollators  Crutches if approved by Physical Therapy and Surgeon after surgery  Hip Kits  Raised Toilet Seats  Additional Compression Stockings    Joint Preservation  Healthy Activities when Cleared  Walking  Swimming  Bike Riding  Activities to Avoid  Refrain from repetitive motions which have a high impact on the joint  Gradual Progression  Progress activity slowly, listen to your body  Common Findings - NORMAL after surgery  Clicking/Grinding  Numbness near incision    Physical Therapy  Prehabilitation exercises  START TODAY ON BOTH LEGS  Surgery Specific Precautions  Follow surgery specific precautions found in your discharge paperwork    Follow-Up Visit  All patients will see their surgeon for a follow up visit after surgery  The visit may range from 2-6 weeks after surgery and is surgeon specific      Please don't hesitate to reach out if you have any additional questions or concerns.    Naomie Philippe MBA, BSN, RN-BC  RUTHANN CallahanN, RN  Orthopedic Program Navigators  [facility]   [phone]

## 2024-03-12 ENCOUNTER — HOSPITAL ENCOUNTER (OUTPATIENT)
Dept: RADIOLOGY | Facility: HOSPITAL | Age: 74
Discharge: HOME | End: 2024-03-12
Payer: MEDICARE

## 2024-03-12 ENCOUNTER — EDUCATION (OUTPATIENT)
Dept: ORTHOPEDIC SURGERY | Facility: HOSPITAL | Age: 74
End: 2024-03-12
Payer: MEDICARE

## 2024-03-12 DIAGNOSIS — M25.351 INSTABILITY OF HIP JOINT, RIGHT: ICD-10-CM

## 2024-03-12 PROCEDURE — 73700 CT LOWER EXTREMITY W/O DYE: CPT | Mod: RT

## 2024-03-14 ENCOUNTER — HOSPITAL ENCOUNTER (OUTPATIENT)
Dept: RADIOLOGY | Facility: HOSPITAL | Age: 74
Discharge: HOME | End: 2024-03-14
Payer: MEDICARE

## 2024-03-14 ENCOUNTER — PRE-ADMISSION TESTING (OUTPATIENT)
Dept: PREADMISSION TESTING | Facility: HOSPITAL | Age: 74
End: 2024-03-14
Payer: MEDICARE

## 2024-03-14 VITALS
BODY MASS INDEX: 24.5 KG/M2 | DIASTOLIC BLOOD PRESSURE: 62 MMHG | HEIGHT: 62 IN | OXYGEN SATURATION: 96 % | TEMPERATURE: 98.4 F | RESPIRATION RATE: 14 BRPM | WEIGHT: 133.16 LBS | HEART RATE: 62 BPM | SYSTOLIC BLOOD PRESSURE: 113 MMHG

## 2024-03-14 DIAGNOSIS — Z01.818 PREOPERATIVE TESTING: Primary | ICD-10-CM

## 2024-03-14 DIAGNOSIS — M16.11 ARTHRITIS OF RIGHT HIP: ICD-10-CM

## 2024-03-14 LAB
ANION GAP SERPL CALC-SCNC: 12 MMOL/L (ref 10–20)
BASOPHILS # BLD AUTO: 0.05 X10*3/UL (ref 0–0.1)
BASOPHILS NFR BLD AUTO: 0.8 %
BUN SERPL-MCNC: 21 MG/DL (ref 6–23)
CALCIUM SERPL-MCNC: 9.8 MG/DL (ref 8.6–10.3)
CHLORIDE SERPL-SCNC: 102 MMOL/L (ref 98–107)
CO2 SERPL-SCNC: 28 MMOL/L (ref 21–32)
CREAT SERPL-MCNC: 0.87 MG/DL (ref 0.5–1.05)
EGFRCR SERPLBLD CKD-EPI 2021: 70 ML/MIN/1.73M*2
EOSINOPHIL # BLD AUTO: 0.39 X10*3/UL (ref 0–0.4)
EOSINOPHIL NFR BLD AUTO: 6.2 %
ERYTHROCYTE [DISTWIDTH] IN BLOOD BY AUTOMATED COUNT: 12.4 % (ref 11.5–14.5)
GLUCOSE SERPL-MCNC: 94 MG/DL (ref 74–99)
HCT VFR BLD AUTO: 36.2 % (ref 36–46)
HGB BLD-MCNC: 12.4 G/DL (ref 12–16)
IMM GRANULOCYTES # BLD AUTO: 0.01 X10*3/UL (ref 0–0.5)
IMM GRANULOCYTES NFR BLD AUTO: 0.2 % (ref 0–0.9)
LYMPHOCYTES # BLD AUTO: 2.68 X10*3/UL (ref 0.8–3)
LYMPHOCYTES NFR BLD AUTO: 42.5 %
MCH RBC QN AUTO: 31.8 PG (ref 26–34)
MCHC RBC AUTO-ENTMCNC: 34.3 G/DL (ref 32–36)
MCV RBC AUTO: 93 FL (ref 80–100)
MONOCYTES # BLD AUTO: 0.49 X10*3/UL (ref 0.05–0.8)
MONOCYTES NFR BLD AUTO: 7.8 %
NEUTROPHILS # BLD AUTO: 2.69 X10*3/UL (ref 1.6–5.5)
NEUTROPHILS NFR BLD AUTO: 42.5 %
NRBC BLD-RTO: ABNORMAL /100{WBCS}
PLATELET # BLD AUTO: 255 X10*3/UL (ref 150–450)
POTASSIUM SERPL-SCNC: 4.3 MMOL/L (ref 3.5–5.3)
RBC # BLD AUTO: 3.9 X10*6/UL (ref 4–5.2)
SODIUM SERPL-SCNC: 138 MMOL/L (ref 136–145)
WBC # BLD AUTO: 6.3 X10*3/UL (ref 4.4–11.3)

## 2024-03-14 PROCEDURE — 93005 ELECTROCARDIOGRAM TRACING: CPT

## 2024-03-14 PROCEDURE — 72100 X-RAY EXAM L-S SPINE 2/3 VWS: CPT

## 2024-03-14 PROCEDURE — 99204 OFFICE O/P NEW MOD 45 MIN: CPT | Performed by: NURSE PRACTITIONER

## 2024-03-14 PROCEDURE — 36415 COLL VENOUS BLD VENIPUNCTURE: CPT

## 2024-03-14 PROCEDURE — 85025 COMPLETE CBC W/AUTO DIFF WBC: CPT

## 2024-03-14 PROCEDURE — 80048 BASIC METABOLIC PNL TOTAL CA: CPT

## 2024-03-14 PROCEDURE — 87081 CULTURE SCREEN ONLY: CPT

## 2024-03-14 RX ORDER — CHLORHEXIDINE GLUCONATE ORAL RINSE 1.2 MG/ML
15 SOLUTION DENTAL AS NEEDED
Qty: 30 ML | Refills: 0 | Status: ON HOLD | OUTPATIENT
Start: 2024-03-14 | End: 2024-03-29 | Stop reason: ALTCHOICE

## 2024-03-14 ASSESSMENT — ENCOUNTER SYMPTOMS
NECK PAIN: 1
HEMATOLOGIC/LYMPHATIC NEGATIVE: 1
GASTROINTESTINAL NEGATIVE: 1
CONSTITUTIONAL NEGATIVE: 1
ENDOCRINE NEGATIVE: 1
ALLERGIC/IMMUNOLOGIC NEGATIVE: 1
NECK STIFFNESS: 1
EYES NEGATIVE: 1
NEUROLOGICAL NEGATIVE: 1
RESPIRATORY NEGATIVE: 1
PSYCHIATRIC NEGATIVE: 1

## 2024-03-14 ASSESSMENT — PAIN DESCRIPTION - DESCRIPTORS: DESCRIPTORS: ACHING;BURNING

## 2024-03-14 ASSESSMENT — PAIN - FUNCTIONAL ASSESSMENT: PAIN_FUNCTIONAL_ASSESSMENT: 0-10

## 2024-03-14 ASSESSMENT — PAIN SCALES - GENERAL: PAINLEVEL_OUTOF10: 6

## 2024-03-14 NOTE — H&P (VIEW-ONLY)
CPM/PAT Evaluation     Lauren Fields is a 74 y.o. female   Chief Complaint: hip pain having my hip replaced    HPI:  Patient is a 73 y/o alert and oriented female coming in for PAT for a scheduled Right Total Hip Arthroplasty on 3/29/24 w/ Dr. Salazar.    The patient reports she has 6/10 achy, burning knee pain.  Walking causes sharp pain in her knee.  She states rest and ice help.  She has had injections and physical therapy in the past.  Her knee does not swell and does not give out.  Patient denies chest pain, SOB, SCHULTE and NVDC.    Patient also denies Hx: DVT/PE.    Current medications were reviewed and a presurgical mediation schedule was provided.    She has no questions at this time.   Past Medical History:   Diagnosis Date    Arthritis 2009    Bursitis of hip approx. 2016-17    Cervical disc disorder 2009    Lumbosacral disc disease 2009    Other allergy status, other than to drugs and biological substances     History of environmental allergies    Personal history of other diseases of the musculoskeletal system and connective tissue     History of spinal stenosis    Personal history of other infectious and parasitic diseases     History of herpes simplex infection    Rotator cuff syndrome had surgical repair 3-    Tendinitis of knee right -- 2023      Past Surgical History:   Procedure Laterality Date    CERVICAL FUSION  01/05/2018    Cervical Vertebral Fusion    OTHER SURGICAL HISTORY  07/17/2020    Ulnar nerve neuroplasty    OTHER SURGICAL HISTORY  07/11/2022    Rotator cuff repair    OTHER SURGICAL HISTORY  07/11/2022    Decompression of spinal cord    OTHER SURGICAL HISTORY  01/27/2020    Tonsillectomy    OTHER SURGICAL HISTORY  01/27/2020    Neck surgery    OTHER SURGICAL HISTORY  01/27/2020    Upper back surgery        Allergies   Allergen Reactions    Hydromorphone Nausea Only    Acetaminophen Rash    Amoxicillin-Pot Clavulanate Rash    Penicillins Rash        Current Outpatient Medications on  File Prior to Visit   Medication Sig Dispense Refill    carboxymethylcellulose sodium (Refresh Contacts) drops OPHTHalmic solution 1 drop 3 times a day.      cholecalciferol (Vitamin D-3) 25 MCG (1000 UT) capsule Take 1 capsule (25 mcg) by mouth once daily.      diclofenac sodium (Voltaren) 1 % gel gel Apply 4.5 inches (4 g) topically 4 times a day as needed.      estrogens, conjugated, (Premarin) vaginal cream Insert into the vagina.      fluticasone (Flonase) 50 mcg/actuation nasal spray Administer into affected nostril(s) once daily.      multivitamin-min-iron-FA-vit K (Adults Multivitamin) 18 mg iron-400 mcg-25 mcg tablet Take 1 tablet by mouth once daily.      turmeric, bulk, 95 % powder Take by mouth.      valACYclovir (Valtrex) 500 mg tablet Take 1 tablet (500 mg) by mouth once daily. 30 tablet 5     Current Facility-Administered Medications on File Prior to Visit   Medication Dose Route Frequency Provider Last Rate Last Admin    methylPREDNISolone acetate (DEPO-Medrol) injection 40 mg  40 mg intramuscular Once Antionette Chen MD          Vitals:    03/14/24 1450   BP: 113/62   Pulse: 62   Resp: 14   Temp: 36.9 °C (98.4 °F)   SpO2: 96%        Review of Systems   Constitutional: Negative.    HENT: Negative.     Eyes: Negative.    Respiratory: Negative.     Cardiovascular:         Hyperlipidemia     Gastrointestinal: Negative.    Endocrine: Negative.    Genitourinary: Negative.    Musculoskeletal:  Positive for neck pain and neck stiffness.        Osteopenia, arthritis, see hpi for details  Neck surgery  Back surgery w/ hardware  Pins in right shoulder   Allergic/Immunologic: Negative.    Neurological: Negative.    Hematological: Negative.    Psychiatric/Behavioral: Negative.        Physical Exam  Vitals and nursing note reviewed.   Constitutional:       Appearance: Normal appearance.   HENT:      Head: Normocephalic and atraumatic.      Mouth/Throat:      Mouth: Mucous membranes are moist.      Pharynx:  Oropharynx is clear.   Eyes:      Extraocular Movements: Extraocular movements intact.      Pupils: Pupils are equal, round, and reactive to light.   Neck:      Comments: Decreased rom cervical spine  Cardiovascular:      Rate and Rhythm: Normal rate and regular rhythm.      Heart sounds: Normal heart sounds.      Comments: EKG today is NSR rate of 62 w/ Left anterior fascicular block  Pulmonary:      Effort: Pulmonary effort is normal.      Breath sounds: Normal breath sounds.   Abdominal:      General: Abdomen is flat. Bowel sounds are normal.      Palpations: Abdomen is soft.   Musculoskeletal:         General: Normal range of motion.   Skin:     General: Skin is warm and dry.   Neurological:      General: No focal deficit present.      Mental Status: She is alert and oriented to person, place, and time.   Psychiatric:         Mood and Affect: Mood normal.         Behavior: Behavior normal.         Thought Content: Thought content normal.         Judgment: Judgment normal.        PAT AIRWAY:   Airway:     Mallampati::  IV    TM distance::  >3 FB    Neck ROM::  Full    Has dental crowns  Does not smoke  Social alcohol intake  No drug use  No issues with anesthesia  No family issues with anesthesia  No allergy to waqar, iodine or shellfish    Assessment and Plan:   Primary Osteoarthritis of Right Hip  Total Hip Arthroplasty - Right  Managed with voltaren 1% gel prn  Managed with tumeric as directed    Hyperlipidemia  On no medications    Osteopenia  Managed with vitamin D3 25mcg daily    ASA II  RCRI - 0 points  Class I Risk 3.9%  MICHAEL - 1 points low Risk for SONU   NSQIP - Predicted length of stay 1-2 days  ARISCAT - 3 points Low Risk 1.6%  DASI 34.7 Points 7.01 Mets  PAULA - 0.2%  JHFRAT -2  points low risk for falls  Clearance - not indicated  PAT Testing - CBC, BMP, MRSA PCR, EKG  A1C on 12/22/23 was 5.8    CHLORHEXIDINE .12% DENTAL RINSE E PRESCIRBED PER  INFECTION PREVENTION PROTOCOL. PATIENT EDUCATED    Patient advised to call Leroy Womack if she does not receive the mouthwash    Reminded patient to get ordered x rays after pat today  Face to Face patient contact time 30 minutes    CRUZ Iqbal 3/14/2024 2:29 PM  Results for orders placed or performed in visit on 03/14/24 (from the past 24 hour(s))   Basic Metabolic Panel   Result Value Ref Range    Glucose 94 74 - 99 mg/dL    Sodium 138 136 - 145 mmol/L    Potassium 4.3 3.5 - 5.3 mmol/L    Chloride 102 98 - 107 mmol/L    Bicarbonate 28 21 - 32 mmol/L    Anion Gap 12 10 - 20 mmol/L    Urea Nitrogen 21 6 - 23 mg/dL    Creatinine 0.87 0.50 - 1.05 mg/dL    eGFR 70 >60 mL/min/1.73m*2    Calcium 9.8 8.6 - 10.3 mg/dL   CBC and Auto Differential   Result Value Ref Range    WBC 6.3 4.4 - 11.3 x10*3/uL    nRBC      RBC 3.90 (L) 4.00 - 5.20 x10*6/uL    Hemoglobin 12.4 12.0 - 16.0 g/dL    Hematocrit 36.2 36.0 - 46.0 %    MCV 93 80 - 100 fL    MCH 31.8 26.0 - 34.0 pg    MCHC 34.3 32.0 - 36.0 g/dL    RDW 12.4 11.5 - 14.5 %    Platelets 255 150 - 450 x10*3/uL    Neutrophils % 42.5 40.0 - 80.0 %    Immature Granulocytes %, Automated 0.2 0.0 - 0.9 %    Lymphocytes % 42.5 13.0 - 44.0 %    Monocytes % 7.8 2.0 - 10.0 %    Eosinophils % 6.2 0.0 - 6.0 %    Basophils % 0.8 0.0 - 2.0 %    Neutrophils Absolute 2.69 1.60 - 5.50 x10*3/uL    Immature Granulocytes Absolute, Automated 0.01 0.00 - 0.50 x10*3/uL    Lymphocytes Absolute 2.68 0.80 - 3.00 x10*3/uL    Monocytes Absolute 0.49 0.05 - 0.80 x10*3/uL    Eosinophils Absolute 0.39 0.00 - 0.40 x10*3/uL    Basophils Absolute 0.05 0.00 - 0.10 x10*3/uL

## 2024-03-14 NOTE — PREPROCEDURE INSTRUCTIONS
Medication List            Accurate as of March 14, 2024  2:49 PM. Always use your most recent med list.                Adults Multivitamin tablet  Generic drug: multivitamin with minerals  Medication Adjustments for Surgery: Stop 7 days before surgery     chlorhexidine 0.12 % solution  Commonly known as: Peridex  Use 15 mL in the mouth or throat if needed (pre operative 15ml swish and spit the night befor and morning of surgery) for up to 2 doses.  Medication Adjustments for Surgery: Other (Comment)  Notes to patient: As directed     cholecalciferol 25 MCG (1000 UT) capsule  Commonly known as: Vitamin D-3  Medication Adjustments for Surgery: Stop 7 days before surgery     estrogens (conjugated) vaginal cream  Commonly known as: Premarin  Medication Adjustments for Surgery: Stop 1 day before surgery     fluticasone 50 mcg/actuation nasal spray  Commonly known as: Flonase  Medication Adjustments for Surgery: Take morning of surgery with sip of water, no other fluids     Refresh Contacts drops OPHTHalmic solution  Generic drug: carboxymethylcellulose sodium  Medication Adjustments for Surgery: Take morning of surgery with sip of water, no other fluids     TURMERIC (BULK) MISC  Medication Adjustments for Surgery: Stop 7 days before surgery     valACYclovir 500 mg tablet  Commonly known as: Valtrex  Take 1 tablet (500 mg) by mouth once daily.  Medication Adjustments for Surgery: Take morning of surgery with sip of water, no other fluids     Voltaren 1 % gel  Generic drug: diclofenac sodium  Medication Adjustments for Surgery: Stop 7 days before surgery                              NPO Instructions:    Do not eat any food after midnight the night before your surgery/procedure.    Additional Instructions:     Seven/Six Days before Surgery:  Review your medication instructions, stop indicated medications  Five Days before Surgery:  Review your medication instructions, stop indicated medications  Three Days before  Surgery:  Review your medication instructions, stop indicated medications  The Day before Surgery:  Review your medication instructions, stop indicated medications  You will be contacted regarding the time of your arrival to facility and surgery time  Do not eat any food after Midnight  Day of Surgery:  Review your medication instructions, take indicated medications  Wear  comfortable loose fitting clothing  Do not use moisturizers, creams, lotions or perfume  All jewelry and valuables should be left at home

## 2024-03-14 NOTE — CPM/PAT H&P
CPM/PAT Evaluation     Lauren Fields is a 74 y.o. female   Chief Complaint: hip pain having my hip replaced    HPI:  Patient is a 75 y/o alert and oriented female coming in for PAT for a scheduled Right Total Hip Arthroplasty on 3/29/24 w/ Dr. Salazar.    The patient reports she has 6/10 achy, burning knee pain.  Walking causes sharp pain in her knee.  She states rest and ice help.  She has had injections and physical therapy in the past.  Her knee does not swell and does not give out.  Patient denies chest pain, SOB, SCHULTE and NVDC.    Patient also denies Hx: DVT/PE.    Current medications were reviewed and a presurgical mediation schedule was provided.    She has no questions at this time.   Past Medical History:   Diagnosis Date    Arthritis 2009    Bursitis of hip approx. 2016-17    Cervical disc disorder 2009    Lumbosacral disc disease 2009    Other allergy status, other than to drugs and biological substances     History of environmental allergies    Personal history of other diseases of the musculoskeletal system and connective tissue     History of spinal stenosis    Personal history of other infectious and parasitic diseases     History of herpes simplex infection    Rotator cuff syndrome had surgical repair 3-    Tendinitis of knee right -- 2023      Past Surgical History:   Procedure Laterality Date    CERVICAL FUSION  01/05/2018    Cervical Vertebral Fusion    OTHER SURGICAL HISTORY  07/17/2020    Ulnar nerve neuroplasty    OTHER SURGICAL HISTORY  07/11/2022    Rotator cuff repair    OTHER SURGICAL HISTORY  07/11/2022    Decompression of spinal cord    OTHER SURGICAL HISTORY  01/27/2020    Tonsillectomy    OTHER SURGICAL HISTORY  01/27/2020    Neck surgery    OTHER SURGICAL HISTORY  01/27/2020    Upper back surgery        Allergies   Allergen Reactions    Hydromorphone Nausea Only    Acetaminophen Rash    Amoxicillin-Pot Clavulanate Rash    Penicillins Rash        Current Outpatient Medications on  File Prior to Visit   Medication Sig Dispense Refill    carboxymethylcellulose sodium (Refresh Contacts) drops OPHTHalmic solution 1 drop 3 times a day.      cholecalciferol (Vitamin D-3) 25 MCG (1000 UT) capsule Take 1 capsule (25 mcg) by mouth once daily.      diclofenac sodium (Voltaren) 1 % gel gel Apply 4.5 inches (4 g) topically 4 times a day as needed.      estrogens, conjugated, (Premarin) vaginal cream Insert into the vagina.      fluticasone (Flonase) 50 mcg/actuation nasal spray Administer into affected nostril(s) once daily.      multivitamin-min-iron-FA-vit K (Adults Multivitamin) 18 mg iron-400 mcg-25 mcg tablet Take 1 tablet by mouth once daily.      turmeric, bulk, 95 % powder Take by mouth.      valACYclovir (Valtrex) 500 mg tablet Take 1 tablet (500 mg) by mouth once daily. 30 tablet 5     Current Facility-Administered Medications on File Prior to Visit   Medication Dose Route Frequency Provider Last Rate Last Admin    methylPREDNISolone acetate (DEPO-Medrol) injection 40 mg  40 mg intramuscular Once Antionette Chen MD          Vitals:    03/14/24 1450   BP: 113/62   Pulse: 62   Resp: 14   Temp: 36.9 °C (98.4 °F)   SpO2: 96%        Review of Systems   Constitutional: Negative.    HENT: Negative.     Eyes: Negative.    Respiratory: Negative.     Cardiovascular:         Hyperlipidemia     Gastrointestinal: Negative.    Endocrine: Negative.    Genitourinary: Negative.    Musculoskeletal:  Positive for neck pain and neck stiffness.        Osteopenia, arthritis, see hpi for details  Neck surgery  Back surgery w/ hardware  Pins in right shoulder   Allergic/Immunologic: Negative.    Neurological: Negative.    Hematological: Negative.    Psychiatric/Behavioral: Negative.        Physical Exam  Vitals and nursing note reviewed.   Constitutional:       Appearance: Normal appearance.   HENT:      Head: Normocephalic and atraumatic.      Mouth/Throat:      Mouth: Mucous membranes are moist.      Pharynx:  Oropharynx is clear.   Eyes:      Extraocular Movements: Extraocular movements intact.      Pupils: Pupils are equal, round, and reactive to light.   Neck:      Comments: Decreased rom cervical spine  Cardiovascular:      Rate and Rhythm: Normal rate and regular rhythm.      Heart sounds: Normal heart sounds.      Comments: EKG today is NSR rate of 62 w/ Left anterior fascicular block  Pulmonary:      Effort: Pulmonary effort is normal.      Breath sounds: Normal breath sounds.   Abdominal:      General: Abdomen is flat. Bowel sounds are normal.      Palpations: Abdomen is soft.   Musculoskeletal:         General: Normal range of motion.   Skin:     General: Skin is warm and dry.   Neurological:      General: No focal deficit present.      Mental Status: She is alert and oriented to person, place, and time.   Psychiatric:         Mood and Affect: Mood normal.         Behavior: Behavior normal.         Thought Content: Thought content normal.         Judgment: Judgment normal.        PAT AIRWAY:   Airway:     Mallampati::  IV    TM distance::  >3 FB    Neck ROM::  Full    Has dental crowns  Does not smoke  Social alcohol intake  No drug use  No issues with anesthesia  No family issues with anesthesia  No allergy to waqar, iodine or shellfish    Assessment and Plan:   Primary Osteoarthritis of Right Hip  Total Hip Arthroplasty - Right  Managed with voltaren 1% gel prn  Managed with tumeric as directed    Hyperlipidemia  On no medications    Osteopenia  Managed with vitamin D3 25mcg daily    ASA II  RCRI - 0 points  Class I Risk 3.9%  MICHAEL - 1 points low Risk for SONU   NSQIP - Predicted length of stay 1-2 days  ARISCAT - 3 points Low Risk 1.6%  DASI 34.7 Points 7.01 Mets  PAULA - 0.2%  JHFRAT -2  points low risk for falls  Clearance - not indicated  PAT Testing - CBC, BMP, MRSA PCR, EKG  A1C on 12/22/23 was 5.8    CHLORHEXIDINE .12% DENTAL RINSE E PRESCIRBED PER  INFECTION PREVENTION PROTOCOL. PATIENT EDUCATED    Patient advised to call Leroy Womack if she does not receive the mouthwash    Reminded patient to get ordered x rays after pat today  Face to Face patient contact time 30 minutes    CRUZ Iqbal 3/14/2024 2:29 PM  Results for orders placed or performed in visit on 03/14/24 (from the past 24 hour(s))   Basic Metabolic Panel   Result Value Ref Range    Glucose 94 74 - 99 mg/dL    Sodium 138 136 - 145 mmol/L    Potassium 4.3 3.5 - 5.3 mmol/L    Chloride 102 98 - 107 mmol/L    Bicarbonate 28 21 - 32 mmol/L    Anion Gap 12 10 - 20 mmol/L    Urea Nitrogen 21 6 - 23 mg/dL    Creatinine 0.87 0.50 - 1.05 mg/dL    eGFR 70 >60 mL/min/1.73m*2    Calcium 9.8 8.6 - 10.3 mg/dL   CBC and Auto Differential   Result Value Ref Range    WBC 6.3 4.4 - 11.3 x10*3/uL    nRBC      RBC 3.90 (L) 4.00 - 5.20 x10*6/uL    Hemoglobin 12.4 12.0 - 16.0 g/dL    Hematocrit 36.2 36.0 - 46.0 %    MCV 93 80 - 100 fL    MCH 31.8 26.0 - 34.0 pg    MCHC 34.3 32.0 - 36.0 g/dL    RDW 12.4 11.5 - 14.5 %    Platelets 255 150 - 450 x10*3/uL    Neutrophils % 42.5 40.0 - 80.0 %    Immature Granulocytes %, Automated 0.2 0.0 - 0.9 %    Lymphocytes % 42.5 13.0 - 44.0 %    Monocytes % 7.8 2.0 - 10.0 %    Eosinophils % 6.2 0.0 - 6.0 %    Basophils % 0.8 0.0 - 2.0 %    Neutrophils Absolute 2.69 1.60 - 5.50 x10*3/uL    Immature Granulocytes Absolute, Automated 0.01 0.00 - 0.50 x10*3/uL    Lymphocytes Absolute 2.68 0.80 - 3.00 x10*3/uL    Monocytes Absolute 0.49 0.05 - 0.80 x10*3/uL    Eosinophils Absolute 0.39 0.00 - 0.40 x10*3/uL    Basophils Absolute 0.05 0.00 - 0.10 x10*3/uL

## 2024-03-15 LAB
ATRIAL RATE: 62 BPM
P AXIS: 36 DEGREES
P OFFSET: 166 MS
P ONSET: 124 MS
PR INTERVAL: 176 MS
Q ONSET: 212 MS
QRS COUNT: 10 BEATS
QRS DURATION: 80 MS
QT INTERVAL: 426 MS
QTC CALCULATION(BAZETT): 432 MS
QTC FREDERICIA: 430 MS
R AXIS: -57 DEGREES
T AXIS: 53 DEGREES
T OFFSET: 425 MS
VENTRICULAR RATE: 62 BPM

## 2024-03-16 LAB — STAPHYLOCOCCUS SPEC CULT: NORMAL

## 2024-03-25 ENCOUNTER — TELEPHONE (OUTPATIENT)
Dept: ORTHOPEDIC SURGERY | Facility: HOSPITAL | Age: 74
End: 2024-03-25
Payer: MEDICARE

## 2024-03-25 NOTE — TELEPHONE ENCOUNTER
Thank you for taking my call today.  All questions were answered at the time of the call, but please feel free to reach out to me via MyChart or phone, [phone], with any new questions or concerns.       We confirmed that you opted to enroll in our [id number] program so your discharge prescriptions will be available to take home at the time of discharge.  Please bring any prescription insurance coverage with you on the morning of surgery so that we can enter the information into our system.     We confirmed that your plan would be to Stay Overnight.    We confirmed that you have DME needed for recovery.     Use the provided body wash for 4 days before surgery and complete a 5th shower on the morning of surgery, this includes your body and hair.  Follow the directions as provided during preadmission testing.  The mouth wash will be used the night before and the morning of surgery.       As a reminder, if you do not hear from our team, please call [phone] between 2pm and 3pm the business day before your surgery to confirm your arrival time and details.        Please don't hesitate to reach out with additional questions or concerns.    Naomie Philippe MBA, BSN, RN-BC  RUTHANN CallahanN, RN  [facility]  [facility]  [phone]

## 2024-03-28 ENCOUNTER — ANESTHESIA EVENT (OUTPATIENT)
Dept: OPERATING ROOM | Facility: HOSPITAL | Age: 74
End: 2024-03-28
Payer: MEDICARE

## 2024-03-28 RX ORDER — TRANEXAMIC ACID 100 MG/ML
1000 INJECTION, SOLUTION INTRAVENOUS 2 TIMES DAILY
Status: CANCELLED | OUTPATIENT
Start: 2024-03-28 | End: 2024-03-29

## 2024-03-29 ENCOUNTER — APPOINTMENT (OUTPATIENT)
Dept: RADIOLOGY | Facility: HOSPITAL | Age: 74
End: 2024-03-29
Payer: MEDICARE

## 2024-03-29 ENCOUNTER — DOCUMENTATION (OUTPATIENT)
Dept: HOME HEALTH SERVICES | Facility: HOME HEALTH | Age: 74
End: 2024-03-29

## 2024-03-29 ENCOUNTER — HOME HEALTH ADMISSION (OUTPATIENT)
Dept: HOME HEALTH SERVICES | Facility: HOME HEALTH | Age: 74
End: 2024-03-29
Payer: MEDICARE

## 2024-03-29 ENCOUNTER — ANESTHESIA (OUTPATIENT)
Dept: OPERATING ROOM | Facility: HOSPITAL | Age: 74
End: 2024-03-29
Payer: MEDICARE

## 2024-03-29 ENCOUNTER — HOSPITAL ENCOUNTER (OUTPATIENT)
Facility: HOSPITAL | Age: 74
Discharge: HOME | End: 2024-03-30
Attending: ORTHOPAEDIC SURGERY | Admitting: ORTHOPAEDIC SURGERY
Payer: MEDICARE

## 2024-03-29 DIAGNOSIS — M16.11 PRIMARY OSTEOARTHRITIS OF RIGHT HIP: Primary | ICD-10-CM

## 2024-03-29 PROCEDURE — 3600000017 HC OR TIME - EACH INCREMENTAL 1 MINUTE - PROCEDURE LEVEL SIX: Performed by: ORTHOPAEDIC SURGERY

## 2024-03-29 PROCEDURE — 7100000011 HC EXTENDED STAY RECOVERY HOURLY - NURSING UNIT

## 2024-03-29 PROCEDURE — 2500000005 HC RX 250 GENERAL PHARMACY W/O HCPCS: Performed by: NURSE ANESTHETIST, CERTIFIED REGISTERED

## 2024-03-29 PROCEDURE — A27130 PR TOTAL HIP ARTHROPLASTY: Performed by: NURSE ANESTHETIST, CERTIFIED REGISTERED

## 2024-03-29 PROCEDURE — 3700000002 HC GENERAL ANESTHESIA TIME - EACH INCREMENTAL 1 MINUTE: Performed by: ORTHOPAEDIC SURGERY

## 2024-03-29 PROCEDURE — 2500000001 HC RX 250 WO HCPCS SELF ADMINISTERED DRUGS (ALT 637 FOR MEDICARE OP): Performed by: ORTHOPAEDIC SURGERY

## 2024-03-29 PROCEDURE — 94760 N-INVAS EAR/PLS OXIMETRY 1: CPT

## 2024-03-29 PROCEDURE — 2500000004 HC RX 250 GENERAL PHARMACY W/ HCPCS (ALT 636 FOR OP/ED): Performed by: NURSE ANESTHETIST, CERTIFIED REGISTERED

## 2024-03-29 PROCEDURE — 3700000001 HC GENERAL ANESTHESIA TIME - INITIAL BASE CHARGE: Performed by: ORTHOPAEDIC SURGERY

## 2024-03-29 PROCEDURE — 2500000004 HC RX 250 GENERAL PHARMACY W/ HCPCS (ALT 636 FOR OP/ED): Mod: JZ | Performed by: ORTHOPAEDIC SURGERY

## 2024-03-29 PROCEDURE — 2780000003 HC OR 278 NO HCPCS: Performed by: ORTHOPAEDIC SURGERY

## 2024-03-29 PROCEDURE — C1776 JOINT DEVICE (IMPLANTABLE): HCPCS | Performed by: ORTHOPAEDIC SURGERY

## 2024-03-29 PROCEDURE — 2500000004 HC RX 250 GENERAL PHARMACY W/ HCPCS (ALT 636 FOR OP/ED): Performed by: ANESTHESIOLOGY

## 2024-03-29 PROCEDURE — C1713 ANCHOR/SCREW BN/BN,TIS/BN: HCPCS | Performed by: ORTHOPAEDIC SURGERY

## 2024-03-29 PROCEDURE — A27130 PR TOTAL HIP ARTHROPLASTY: Performed by: ANESTHESIOLOGY

## 2024-03-29 PROCEDURE — A4649 SURGICAL SUPPLIES: HCPCS | Performed by: ORTHOPAEDIC SURGERY

## 2024-03-29 PROCEDURE — 72170 X-RAY EXAM OF PELVIS: CPT

## 2024-03-29 PROCEDURE — 97161 PT EVAL LOW COMPLEX 20 MIN: CPT | Mod: GP

## 2024-03-29 PROCEDURE — 7100000001 HC RECOVERY ROOM TIME - INITIAL BASE CHARGE: Performed by: ORTHOPAEDIC SURGERY

## 2024-03-29 PROCEDURE — 2720000007 HC OR 272 NO HCPCS: Performed by: ORTHOPAEDIC SURGERY

## 2024-03-29 PROCEDURE — 7100000002 HC RECOVERY ROOM TIME - EACH INCREMENTAL 1 MINUTE: Performed by: ORTHOPAEDIC SURGERY

## 2024-03-29 PROCEDURE — 2500000005 HC RX 250 GENERAL PHARMACY W/O HCPCS: Performed by: ORTHOPAEDIC SURGERY

## 2024-03-29 PROCEDURE — 3600000018 HC OR TIME - INITIAL BASE CHARGE - PROCEDURE LEVEL SIX: Performed by: ORTHOPAEDIC SURGERY

## 2024-03-29 PROCEDURE — 99100 ANES PT EXTEME AGE<1 YR&>70: CPT | Performed by: ANESTHESIOLOGY

## 2024-03-29 PROCEDURE — 97110 THERAPEUTIC EXERCISES: CPT | Mod: GP

## 2024-03-29 PROCEDURE — 2500000002 HC RX 250 W HCPCS SELF ADMINISTERED DRUGS (ALT 637 FOR MEDICARE OP, ALT 636 FOR OP/ED): Performed by: PHYSICIAN ASSISTANT

## 2024-03-29 PROCEDURE — 72170 X-RAY EXAM OF PELVIS: CPT | Performed by: RADIOLOGY

## 2024-03-29 PROCEDURE — 2500000004 HC RX 250 GENERAL PHARMACY W/ HCPCS (ALT 636 FOR OP/ED): Performed by: ORTHOPAEDIC SURGERY

## 2024-03-29 PROCEDURE — 27130 TOTAL HIP ARTHROPLASTY: CPT | Performed by: ORTHOPAEDIC SURGERY

## 2024-03-29 DEVICE — 127 DEGREE NECK ANGLE HIP STEM
Type: IMPLANTABLE DEVICE | Site: HIP | Status: FUNCTIONAL
Brand: ACCOLADE

## 2024-03-29 DEVICE — IMPLANTABLE DEVICE: Type: IMPLANTABLE DEVICE | Site: HIP | Status: FUNCTIONAL

## 2024-03-29 DEVICE — 6.5MM LOW PROFILE HEX SCREW 25MM
Type: IMPLANTABLE DEVICE | Site: HIP | Status: FUNCTIONAL
Brand: TRIDENT II

## 2024-03-29 DEVICE — LINER- CEMENTLESS
Type: IMPLANTABLE DEVICE | Site: HIP | Status: FUNCTIONAL
Brand: MDM

## 2024-03-29 DEVICE — LFIT V40 FEMORAL HEAD
Type: IMPLANTABLE DEVICE | Site: HIP | Status: FUNCTIONAL
Brand: V40 HEAD

## 2024-03-29 DEVICE — TRIDENT II TRITANIUM CLUSTER 48D
Type: IMPLANTABLE DEVICE | Site: HIP | Status: FUNCTIONAL
Brand: TRIDENT II

## 2024-03-29 DEVICE — BONE PINS (3.2MM X 140MM): Type: IMPLANTABLE DEVICE | Site: HIP | Status: NON-FUNCTIONAL

## 2024-03-29 RX ORDER — ROPIVACAINE/EPI/CLONIDINE/KET 2.46-0.005
SYRINGE (ML) INJECTION AS NEEDED
Status: DISCONTINUED | OUTPATIENT
Start: 2024-03-29 | End: 2024-03-29 | Stop reason: HOSPADM

## 2024-03-29 RX ORDER — SODIUM CHLORIDE, SODIUM LACTATE, POTASSIUM CHLORIDE, CALCIUM CHLORIDE 600; 310; 30; 20 MG/100ML; MG/100ML; MG/100ML; MG/100ML
100 INJECTION, SOLUTION INTRAVENOUS CONTINUOUS
Status: DISCONTINUED | OUTPATIENT
Start: 2024-03-29 | End: 2024-03-30 | Stop reason: HOSPADM

## 2024-03-29 RX ORDER — CEFAZOLIN SODIUM 2 G/100ML
2 INJECTION, SOLUTION INTRAVENOUS ONCE
Status: COMPLETED | OUTPATIENT
Start: 2024-03-29 | End: 2024-03-29

## 2024-03-29 RX ORDER — SENNOSIDES 8.6 MG/1
2 TABLET ORAL 2 TIMES DAILY
Status: DISCONTINUED | OUTPATIENT
Start: 2024-03-29 | End: 2024-03-30 | Stop reason: HOSPADM

## 2024-03-29 RX ORDER — CEFAZOLIN SODIUM 2 G/100ML
2 INJECTION, SOLUTION INTRAVENOUS EVERY 8 HOURS
Status: COMPLETED | OUTPATIENT
Start: 2024-03-29 | End: 2024-03-30

## 2024-03-29 RX ORDER — PROPOFOL 10 MG/ML
INJECTION, EMULSION INTRAVENOUS AS NEEDED
Status: DISCONTINUED | OUTPATIENT
Start: 2024-03-29 | End: 2024-03-29

## 2024-03-29 RX ORDER — PANTOPRAZOLE SODIUM 40 MG/1
40 TABLET, DELAYED RELEASE ORAL
Status: DISCONTINUED | OUTPATIENT
Start: 2024-03-30 | End: 2024-03-30 | Stop reason: HOSPADM

## 2024-03-29 RX ORDER — CELECOXIB 200 MG/1
200 CAPSULE ORAL ONCE
Status: COMPLETED | OUTPATIENT
Start: 2024-03-29 | End: 2024-03-29

## 2024-03-29 RX ORDER — ONDANSETRON HYDROCHLORIDE 2 MG/ML
4 INJECTION, SOLUTION INTRAVENOUS ONCE AS NEEDED
Status: DISCONTINUED | OUTPATIENT
Start: 2024-03-29 | End: 2024-03-29 | Stop reason: HOSPADM

## 2024-03-29 RX ORDER — BISACODYL 5 MG
10 TABLET, DELAYED RELEASE (ENTERIC COATED) ORAL DAILY PRN
Status: DISCONTINUED | OUTPATIENT
Start: 2024-03-29 | End: 2024-03-30 | Stop reason: HOSPADM

## 2024-03-29 RX ORDER — METOCLOPRAMIDE HYDROCHLORIDE 5 MG/ML
10 INJECTION INTRAMUSCULAR; INTRAVENOUS EVERY 6 HOURS PRN
Status: DISCONTINUED | OUTPATIENT
Start: 2024-03-29 | End: 2024-03-30 | Stop reason: HOSPADM

## 2024-03-29 RX ORDER — ACETAMINOPHEN 325 MG/1
650 TABLET ORAL EVERY 6 HOURS SCHEDULED
Status: DISCONTINUED | OUTPATIENT
Start: 2024-03-29 | End: 2024-03-30

## 2024-03-29 RX ORDER — VALACYCLOVIR HYDROCHLORIDE 500 MG/1
500 TABLET, FILM COATED ORAL DAILY
Status: DISCONTINUED | OUTPATIENT
Start: 2024-03-29 | End: 2024-03-30 | Stop reason: HOSPADM

## 2024-03-29 RX ORDER — VANCOMYCIN 1 G/200ML
1 INJECTION, SOLUTION INTRAVENOUS ONCE
Status: COMPLETED | OUTPATIENT
Start: 2024-03-29 | End: 2024-03-29

## 2024-03-29 RX ORDER — SCOLOPAMINE TRANSDERMAL SYSTEM 1 MG/1
1 PATCH, EXTENDED RELEASE TRANSDERMAL
Status: DISCONTINUED | OUTPATIENT
Start: 2024-03-29 | End: 2024-03-30 | Stop reason: HOSPADM

## 2024-03-29 RX ORDER — CYCLOBENZAPRINE HCL 5 MG
5 TABLET ORAL 3 TIMES DAILY PRN
Qty: 30 TABLET | Refills: 0 | Status: SHIPPED | OUTPATIENT
Start: 2024-03-29 | End: 2024-03-30 | Stop reason: HOSPADM

## 2024-03-29 RX ORDER — ONDANSETRON HYDROCHLORIDE 2 MG/ML
INJECTION, SOLUTION INTRAVENOUS AS NEEDED
Status: DISCONTINUED | OUTPATIENT
Start: 2024-03-29 | End: 2024-03-29

## 2024-03-29 RX ORDER — ASPIRIN 81 MG/1
81 TABLET ORAL 2 TIMES DAILY
Status: DISCONTINUED | OUTPATIENT
Start: 2024-03-29 | End: 2024-03-30 | Stop reason: HOSPADM

## 2024-03-29 RX ORDER — BUPIVACAINE HYDROCHLORIDE AND EPINEPHRINE 5; 5 MG/ML; UG/ML
INJECTION, SOLUTION EPIDURAL; INTRACAUDAL; PERINEURAL AS NEEDED
Status: DISCONTINUED | OUTPATIENT
Start: 2024-03-29 | End: 2024-03-29 | Stop reason: HOSPADM

## 2024-03-29 RX ORDER — VANCOMYCIN HYDROCHLORIDE 1 G/20ML
1 INJECTION, POWDER, LYOPHILIZED, FOR SOLUTION INTRAVENOUS ONCE
Status: DISCONTINUED | OUTPATIENT
Start: 2024-03-29 | End: 2024-03-29 | Stop reason: HOSPADM

## 2024-03-29 RX ORDER — HYDRALAZINE HYDROCHLORIDE 20 MG/ML
5 INJECTION INTRAMUSCULAR; INTRAVENOUS EVERY 30 MIN PRN
Status: DISCONTINUED | OUTPATIENT
Start: 2024-03-29 | End: 2024-03-29 | Stop reason: HOSPADM

## 2024-03-29 RX ORDER — FENTANYL CITRATE 50 UG/ML
25 INJECTION, SOLUTION INTRAMUSCULAR; INTRAVENOUS
Status: ACTIVE | OUTPATIENT
Start: 2024-03-29 | End: 2024-03-29

## 2024-03-29 RX ORDER — OXYCODONE HYDROCHLORIDE 5 MG/1
5 TABLET ORAL EVERY 6 HOURS PRN
Qty: 28 TABLET | Refills: 0 | Status: SHIPPED | OUTPATIENT
Start: 2024-03-29 | End: 2024-03-30 | Stop reason: HOSPADM

## 2024-03-29 RX ORDER — MIDAZOLAM HYDROCHLORIDE 1 MG/ML
INJECTION, SOLUTION INTRAMUSCULAR; INTRAVENOUS AS NEEDED
Status: DISCONTINUED | OUTPATIENT
Start: 2024-03-29 | End: 2024-03-29

## 2024-03-29 RX ORDER — METOCLOPRAMIDE 10 MG/1
10 TABLET ORAL EVERY 6 HOURS PRN
Status: DISCONTINUED | OUTPATIENT
Start: 2024-03-29 | End: 2024-03-30 | Stop reason: HOSPADM

## 2024-03-29 RX ORDER — PANTOPRAZOLE SODIUM 40 MG/1
40 TABLET, DELAYED RELEASE ORAL DAILY PRN
Qty: 30 TABLET | Refills: 0 | Status: SHIPPED | OUTPATIENT
Start: 2024-03-29 | End: 2024-03-30 | Stop reason: HOSPADM

## 2024-03-29 RX ORDER — FENTANYL CITRATE 50 UG/ML
50 INJECTION, SOLUTION INTRAMUSCULAR; INTRAVENOUS
Status: ACTIVE | OUTPATIENT
Start: 2024-03-29 | End: 2024-03-29

## 2024-03-29 RX ORDER — OXYCODONE HYDROCHLORIDE 5 MG/1
5 TABLET ORAL EVERY 4 HOURS PRN
Status: DISCONTINUED | OUTPATIENT
Start: 2024-03-29 | End: 2024-03-30 | Stop reason: HOSPADM

## 2024-03-29 RX ORDER — VANCOMYCIN HYDROCHLORIDE 1 G/20ML
INJECTION, POWDER, LYOPHILIZED, FOR SOLUTION INTRAVENOUS AS NEEDED
Status: DISCONTINUED | OUTPATIENT
Start: 2024-03-29 | End: 2024-03-29 | Stop reason: HOSPADM

## 2024-03-29 RX ORDER — NORETHINDRONE AND ETHINYL ESTRADIOL 0.5-0.035
KIT ORAL AS NEEDED
Status: DISCONTINUED | OUTPATIENT
Start: 2024-03-29 | End: 2024-03-29

## 2024-03-29 RX ORDER — TRAMADOL HYDROCHLORIDE 50 MG/1
50 TABLET ORAL EVERY 6 HOURS PRN
Qty: 28 TABLET | Refills: 0 | Status: SHIPPED | OUTPATIENT
Start: 2024-03-29 | End: 2024-03-30 | Stop reason: HOSPADM

## 2024-03-29 RX ORDER — PHENYLEPHRINE HCL IN 0.9% NACL 1 MG/10 ML
SYRINGE (ML) INTRAVENOUS AS NEEDED
Status: DISCONTINUED | OUTPATIENT
Start: 2024-03-29 | End: 2024-03-29

## 2024-03-29 RX ORDER — NAPROXEN SODIUM 220 MG/1
81 TABLET, FILM COATED ORAL 2 TIMES DAILY
Qty: 60 TABLET | Refills: 0 | Status: SHIPPED | OUTPATIENT
Start: 2024-03-29 | End: 2024-03-30 | Stop reason: HOSPADM

## 2024-03-29 RX ORDER — KETOROLAC TROMETHAMINE 15 MG/ML
15 INJECTION, SOLUTION INTRAMUSCULAR; INTRAVENOUS EVERY 6 HOURS
Status: COMPLETED | OUTPATIENT
Start: 2024-03-29 | End: 2024-03-30

## 2024-03-29 RX ORDER — ACETAMINOPHEN 325 MG/1
975 TABLET ORAL ONCE
Status: DISCONTINUED | OUTPATIENT
Start: 2024-03-29 | End: 2024-03-29

## 2024-03-29 RX ORDER — ALBUTEROL SULFATE 0.83 MG/ML
2.5 SOLUTION RESPIRATORY (INHALATION) ONCE AS NEEDED
Status: DISCONTINUED | OUTPATIENT
Start: 2024-03-29 | End: 2024-03-29 | Stop reason: HOSPADM

## 2024-03-29 RX ORDER — DIPHENHYDRAMINE HCL 12.5MG/5ML
12.5 LIQUID (ML) ORAL EVERY 6 HOURS PRN
Status: DISCONTINUED | OUTPATIENT
Start: 2024-03-29 | End: 2024-03-30 | Stop reason: HOSPADM

## 2024-03-29 RX ORDER — OXYCODONE HCL 10 MG/1
10 TABLET, FILM COATED, EXTENDED RELEASE ORAL ONCE
Status: COMPLETED | OUTPATIENT
Start: 2024-03-29 | End: 2024-03-29

## 2024-03-29 RX ORDER — OXYCODONE HYDROCHLORIDE 5 MG/1
10 TABLET ORAL EVERY 4 HOURS PRN
Status: DISCONTINUED | OUTPATIENT
Start: 2024-03-29 | End: 2024-03-30 | Stop reason: HOSPADM

## 2024-03-29 RX ORDER — SODIUM CHLORIDE, SODIUM LACTATE, POTASSIUM CHLORIDE, CALCIUM CHLORIDE 600; 310; 30; 20 MG/100ML; MG/100ML; MG/100ML; MG/100ML
100 INJECTION, SOLUTION INTRAVENOUS CONTINUOUS
Status: DISCONTINUED | OUTPATIENT
Start: 2024-03-29 | End: 2024-03-29 | Stop reason: HOSPADM

## 2024-03-29 RX ORDER — ONDANSETRON HYDROCHLORIDE 2 MG/ML
4 INJECTION, SOLUTION INTRAVENOUS EVERY 8 HOURS PRN
Status: DISCONTINUED | OUTPATIENT
Start: 2024-03-29 | End: 2024-03-30 | Stop reason: HOSPADM

## 2024-03-29 RX ORDER — CYCLOBENZAPRINE HCL 10 MG
5 TABLET ORAL 3 TIMES DAILY PRN
Status: DISCONTINUED | OUTPATIENT
Start: 2024-03-29 | End: 2024-03-30 | Stop reason: HOSPADM

## 2024-03-29 RX ORDER — LIDOCAINE HYDROCHLORIDE 10 MG/ML
0.1 INJECTION INFILTRATION; PERINEURAL ONCE
Status: DISCONTINUED | OUTPATIENT
Start: 2024-03-29 | End: 2024-03-29 | Stop reason: HOSPADM

## 2024-03-29 RX ORDER — DIPHENHYDRAMINE HYDROCHLORIDE 50 MG/ML
12.5 INJECTION INTRAMUSCULAR; INTRAVENOUS EVERY 6 HOURS PRN
Status: DISCONTINUED | OUTPATIENT
Start: 2024-03-29 | End: 2024-03-30 | Stop reason: HOSPADM

## 2024-03-29 RX ORDER — NALOXONE HYDROCHLORIDE 0.4 MG/ML
0.2 INJECTION, SOLUTION INTRAMUSCULAR; INTRAVENOUS; SUBCUTANEOUS EVERY 5 MIN PRN
Status: DISCONTINUED | OUTPATIENT
Start: 2024-03-29 | End: 2024-03-30 | Stop reason: HOSPADM

## 2024-03-29 RX ORDER — CELECOXIB 200 MG/1
200 CAPSULE ORAL 2 TIMES DAILY
Qty: 60 CAPSULE | Refills: 0 | Status: SHIPPED | OUTPATIENT
Start: 2024-03-29 | End: 2024-03-30 | Stop reason: HOSPADM

## 2024-03-29 RX ORDER — TRANEXAMIC ACID 100 MG/ML
INJECTION, SOLUTION INTRAVENOUS AS NEEDED
Status: DISCONTINUED | OUTPATIENT
Start: 2024-03-29 | End: 2024-03-29

## 2024-03-29 RX ORDER — SODIUM CHLORIDE, SODIUM LACTATE, POTASSIUM CHLORIDE, CALCIUM CHLORIDE 600; 310; 30; 20 MG/100ML; MG/100ML; MG/100ML; MG/100ML
100 INJECTION, SOLUTION INTRAVENOUS CONTINUOUS
Status: DISCONTINUED | OUTPATIENT
Start: 2024-03-29 | End: 2024-03-29

## 2024-03-29 RX ORDER — SENNOSIDES 8.6 MG/1
1 TABLET ORAL 2 TIMES DAILY
Qty: 60 TABLET | Refills: 0 | Status: SHIPPED | OUTPATIENT
Start: 2024-03-29 | End: 2024-03-30 | Stop reason: HOSPADM

## 2024-03-29 RX ORDER — ONDANSETRON 4 MG/1
4 TABLET, ORALLY DISINTEGRATING ORAL EVERY 8 HOURS PRN
Status: DISCONTINUED | OUTPATIENT
Start: 2024-03-29 | End: 2024-03-30 | Stop reason: HOSPADM

## 2024-03-29 RX ADMIN — MIDAZOLAM 2 MG: 1 INJECTION INTRAMUSCULAR; INTRAVENOUS at 12:03

## 2024-03-29 RX ADMIN — EPHEDRINE SULFATE 10 MG: 50 INJECTION, SOLUTION INTRAVENOUS at 13:21

## 2024-03-29 RX ADMIN — Medication 100 MCG: at 12:17

## 2024-03-29 RX ADMIN — EPHEDRINE SULFATE 10 MG: 50 INJECTION, SOLUTION INTRAVENOUS at 12:41

## 2024-03-29 RX ADMIN — EPHEDRINE SULFATE 10 MG: 50 INJECTION, SOLUTION INTRAVENOUS at 12:29

## 2024-03-29 RX ADMIN — SODIUM CHLORIDE, SODIUM LACTATE, POTASSIUM CHLORIDE, AND CALCIUM CHLORIDE 100 ML/HR: 600; 310; 30; 20 INJECTION, SOLUTION INTRAVENOUS at 15:50

## 2024-03-29 RX ADMIN — CEFAZOLIN SODIUM 2 G: 2 INJECTION, SOLUTION INTRAVENOUS at 11:57

## 2024-03-29 RX ADMIN — ONDANSETRON 4 MG: 2 INJECTION INTRAMUSCULAR; INTRAVENOUS at 13:23

## 2024-03-29 RX ADMIN — FENTANYL CITRATE 25 MCG: 50 INJECTION INTRAMUSCULAR; INTRAVENOUS at 14:05

## 2024-03-29 RX ADMIN — VANCOMYCIN 1 G: 1 INJECTION, SOLUTION INTRAVENOUS at 09:39

## 2024-03-29 RX ADMIN — Medication 200 MCG: at 12:20

## 2024-03-29 RX ADMIN — TRANEXAMIC ACID 1000 MG: 1 INJECTION, SOLUTION INTRAVENOUS at 12:12

## 2024-03-29 RX ADMIN — POVIDONE-IODINE 1 APPLICATION: 5 SOLUTION TOPICAL at 10:02

## 2024-03-29 RX ADMIN — SODIUM CHLORIDE, SODIUM LACTATE, POTASSIUM CHLORIDE, AND CALCIUM CHLORIDE 100 ML/HR: 600; 310; 30; 20 INJECTION, SOLUTION INTRAVENOUS at 10:02

## 2024-03-29 RX ADMIN — Medication 100 MCG: at 12:15

## 2024-03-29 RX ADMIN — CELECOXIB 200 MG: 200 CAPSULE ORAL at 09:39

## 2024-03-29 RX ADMIN — SENNOSIDES 17.2 MG: 8.6 TABLET, FILM COATED ORAL at 20:13

## 2024-03-29 RX ADMIN — VALACYCLOVIR HYDROCHLORIDE 500 MG: 500 TABLET, FILM COATED ORAL at 18:02

## 2024-03-29 RX ADMIN — KETOROLAC TROMETHAMINE 15 MG: 15 INJECTION, SOLUTION INTRAMUSCULAR; INTRAVENOUS at 15:50

## 2024-03-29 RX ADMIN — TRANEXAMIC ACID 1000 MG: 1 INJECTION, SOLUTION INTRAVENOUS at 12:55

## 2024-03-29 RX ADMIN — EPHEDRINE SULFATE 20 MG: 50 INJECTION, SOLUTION INTRAVENOUS at 12:56

## 2024-03-29 RX ADMIN — KETOROLAC TROMETHAMINE 15 MG: 15 INJECTION, SOLUTION INTRAMUSCULAR; INTRAVENOUS at 21:59

## 2024-03-29 RX ADMIN — ASPIRIN 81 MG: 81 TABLET, COATED ORAL at 20:13

## 2024-03-29 RX ADMIN — CEFAZOLIN SODIUM 2 G: 2 INJECTION, SOLUTION INTRAVENOUS at 20:13

## 2024-03-29 RX ADMIN — OXYCODONE HYDROCHLORIDE 10 MG: 10 TABLET, FILM COATED, EXTENDED RELEASE ORAL at 09:39

## 2024-03-29 RX ADMIN — PROPOFOL 500 MG: 10 INJECTION, EMULSION INTRAVENOUS at 12:10

## 2024-03-29 RX ADMIN — SCOPALAMINE 1 PATCH: 1 PATCH, EXTENDED RELEASE TRANSDERMAL at 09:39

## 2024-03-29 SDOH — ECONOMIC STABILITY: TRANSPORTATION INSECURITY
IN THE PAST 12 MONTHS, HAS LACK OF TRANSPORTATION KEPT YOU FROM MEETINGS, WORK, OR FROM GETTING THINGS NEEDED FOR DAILY LIVING?: NO

## 2024-03-29 SDOH — SOCIAL STABILITY: SOCIAL INSECURITY: WITHIN THE LAST YEAR, HAVE YOU BEEN AFRAID OF YOUR PARTNER OR EX-PARTNER?: NO

## 2024-03-29 SDOH — SOCIAL STABILITY: SOCIAL INSECURITY: ABUSE: ADULT

## 2024-03-29 SDOH — SOCIAL STABILITY: SOCIAL NETWORK: ARE YOU MARRIED, WIDOWED, DIVORCED, SEPARATED, NEVER MARRIED, OR LIVING WITH A PARTNER?: DIVORCED

## 2024-03-29 SDOH — SOCIAL STABILITY: SOCIAL INSECURITY
WITHIN THE LAST YEAR, HAVE YOU BEEN KICKED, HIT, SLAPPED, OR OTHERWISE PHYSICALLY HURT BY YOUR PARTNER OR EX-PARTNER?: NO

## 2024-03-29 SDOH — HEALTH STABILITY: MENTAL HEALTH
STRESS IS WHEN SOMEONE FEELS TENSE, NERVOUS, ANXIOUS, OR CAN'T SLEEP AT NIGHT BECAUSE THEIR MIND IS TROUBLED. HOW STRESSED ARE YOU?: NOT AT ALL

## 2024-03-29 SDOH — HEALTH STABILITY: MENTAL HEALTH: HOW OFTEN DO YOU HAVE 6 OR MORE DRINKS ON ONE OCCASION?: NEVER

## 2024-03-29 SDOH — ECONOMIC STABILITY: INCOME INSECURITY: IN THE LAST 12 MONTHS, WAS THERE A TIME WHEN YOU WERE NOT ABLE TO PAY THE MORTGAGE OR RENT ON TIME?: NO

## 2024-03-29 SDOH — ECONOMIC STABILITY: HOUSING INSECURITY
IN THE LAST 12 MONTHS, WAS THERE A TIME WHEN YOU DID NOT HAVE A STEADY PLACE TO SLEEP OR SLEPT IN A SHELTER (INCLUDING NOW)?: NO

## 2024-03-29 SDOH — HEALTH STABILITY: MENTAL HEALTH: HOW MANY STANDARD DRINKS CONTAINING ALCOHOL DO YOU HAVE ON A TYPICAL DAY?: 1 OR 2

## 2024-03-29 SDOH — ECONOMIC STABILITY: INCOME INSECURITY: IN THE PAST 12 MONTHS, HAS THE ELECTRIC, GAS, OIL, OR WATER COMPANY THREATENED TO SHUT OFF SERVICE IN YOUR HOME?: NO

## 2024-03-29 SDOH — HEALTH STABILITY: MENTAL HEALTH: HOW OFTEN DO YOU HAVE A DRINK CONTAINING ALCOHOL?: MONTHLY OR LESS

## 2024-03-29 SDOH — SOCIAL STABILITY: SOCIAL INSECURITY: ARE YOU OR HAVE YOU BEEN THREATENED OR ABUSED PHYSICALLY, EMOTIONALLY, OR SEXUALLY BY ANYONE?: NO

## 2024-03-29 SDOH — SOCIAL STABILITY: SOCIAL INSECURITY: DO YOU FEEL UNSAFE GOING BACK TO THE PLACE WHERE YOU ARE LIVING?: NO

## 2024-03-29 SDOH — SOCIAL STABILITY: SOCIAL INSECURITY
WITHIN THE LAST YEAR, HAVE TO BEEN RAPED OR FORCED TO HAVE ANY KIND OF SEXUAL ACTIVITY BY YOUR PARTNER OR EX-PARTNER?: NO

## 2024-03-29 SDOH — SOCIAL STABILITY: SOCIAL INSECURITY: WITHIN THE LAST YEAR, HAVE YOU BEEN HUMILIATED OR EMOTIONALLY ABUSED IN OTHER WAYS BY YOUR PARTNER OR EX-PARTNER?: NO

## 2024-03-29 SDOH — ECONOMIC STABILITY: FOOD INSECURITY: WITHIN THE PAST 12 MONTHS, THE FOOD YOU BOUGHT JUST DIDN'T LAST AND YOU DIDN'T HAVE MONEY TO GET MORE.: NEVER TRUE

## 2024-03-29 SDOH — SOCIAL STABILITY: SOCIAL INSECURITY: DO YOU FEEL ANYONE HAS EXPLOITED OR TAKEN ADVANTAGE OF YOU FINANCIALLY OR OF YOUR PERSONAL PROPERTY?: NO

## 2024-03-29 SDOH — HEALTH STABILITY: MENTAL HEALTH: CURRENT SMOKER: 0

## 2024-03-29 SDOH — SOCIAL STABILITY: SOCIAL INSECURITY: DOES ANYONE TRY TO KEEP YOU FROM HAVING/CONTACTING OTHER FRIENDS OR DOING THINGS OUTSIDE YOUR HOME?: NO

## 2024-03-29 SDOH — SOCIAL STABILITY: SOCIAL INSECURITY: HAS ANYONE EVER THREATENED TO HURT YOUR FAMILY OR YOUR PETS?: NO

## 2024-03-29 SDOH — ECONOMIC STABILITY: FOOD INSECURITY: WITHIN THE PAST 12 MONTHS, YOU WORRIED THAT YOUR FOOD WOULD RUN OUT BEFORE YOU GOT MONEY TO BUY MORE.: NEVER TRUE

## 2024-03-29 SDOH — SOCIAL STABILITY: SOCIAL NETWORK
IN A TYPICAL WEEK, HOW MANY TIMES DO YOU TALK ON THE PHONE WITH FAMILY, FRIENDS, OR NEIGHBORS?: MORE THAN THREE TIMES A WEEK

## 2024-03-29 SDOH — HEALTH STABILITY: PHYSICAL HEALTH: ON AVERAGE, HOW MANY MINUTES DO YOU ENGAGE IN EXERCISE AT THIS LEVEL?: 0 MIN

## 2024-03-29 SDOH — SOCIAL STABILITY: SOCIAL NETWORK: HOW OFTEN DO YOU GET TOGETHER WITH FRIENDS OR RELATIVES?: MORE THAN THREE TIMES A WEEK

## 2024-03-29 SDOH — SOCIAL STABILITY: SOCIAL INSECURITY: WERE YOU ABLE TO COMPLETE ALL THE BEHAVIORAL HEALTH SCREENINGS?: YES

## 2024-03-29 SDOH — SOCIAL STABILITY: SOCIAL INSECURITY: HAVE YOU HAD THOUGHTS OF HARMING ANYONE ELSE?: NO

## 2024-03-29 SDOH — HEALTH STABILITY: PHYSICAL HEALTH: ON AVERAGE, HOW MANY DAYS PER WEEK DO YOU ENGAGE IN MODERATE TO STRENUOUS EXERCISE (LIKE A BRISK WALK)?: 0 DAYS

## 2024-03-29 SDOH — ECONOMIC STABILITY: INCOME INSECURITY: HOW HARD IS IT FOR YOU TO PAY FOR THE VERY BASICS LIKE FOOD, HOUSING, MEDICAL CARE, AND HEATING?: NOT HARD AT ALL

## 2024-03-29 SDOH — SOCIAL STABILITY: SOCIAL INSECURITY: ARE THERE ANY APPARENT SIGNS OF INJURIES/BEHAVIORS THAT COULD BE RELATED TO ABUSE/NEGLECT?: NO

## 2024-03-29 SDOH — ECONOMIC STABILITY: TRANSPORTATION INSECURITY
IN THE PAST 12 MONTHS, HAS THE LACK OF TRANSPORTATION KEPT YOU FROM MEDICAL APPOINTMENTS OR FROM GETTING MEDICATIONS?: NO

## 2024-03-29 SDOH — ECONOMIC STABILITY: HOUSING INSECURITY: IN THE LAST 12 MONTHS, HOW MANY PLACES HAVE YOU LIVED?: 1

## 2024-03-29 ASSESSMENT — ACTIVITIES OF DAILY LIVING (ADL)
PATIENT'S MEMORY ADEQUATE TO SAFELY COMPLETE DAILY ACTIVITIES?: YES
FEEDING YOURSELF: INDEPENDENT
ADEQUATE_TO_COMPLETE_ADL: YES
DRESSING YOURSELF: NEEDS ASSISTANCE
GROOMING: INDEPENDENT
TOILETING: NEEDS ASSISTANCE
HEARING - LEFT EAR: FUNCTIONAL
BATHING: NEEDS ASSISTANCE
LACK_OF_TRANSPORTATION: NO
WALKS IN HOME: NEEDS ASSISTANCE
HEARING - RIGHT EAR: FUNCTIONAL
ADL_ASSISTANCE: INDEPENDENT
ASSISTIVE_DEVICE: WALKER
ADLS_ADDRESSED: YES
JUDGMENT_ADEQUATE_SAFELY_COMPLETE_DAILY_ACTIVITIES: YES

## 2024-03-29 ASSESSMENT — COGNITIVE AND FUNCTIONAL STATUS - GENERAL
DRESSING REGULAR LOWER BODY CLOTHING: A LITTLE
CLIMB 3 TO 5 STEPS WITH RAILING: A LITTLE
DRESSING REGULAR UPPER BODY CLOTHING: A LITTLE
MOVING TO AND FROM BED TO CHAIR: A LITTLE
DAILY ACTIVITIY SCORE: 18
EATING MEALS: A LITTLE
DRESSING REGULAR LOWER BODY CLOTHING: A LITTLE
MOBILITY SCORE: 18
MOBILITY SCORE: 23
CLIMB 3 TO 5 STEPS WITH RAILING: A LITTLE
STANDING UP FROM CHAIR USING ARMS: A LITTLE
HELP NEEDED FOR BATHING: A LITTLE
PATIENT BASELINE BEDBOUND: NO
CLIMB 3 TO 5 STEPS WITH RAILING: A LITTLE
TOILETING: A LITTLE
MOBILITY SCORE: 23
TURNING FROM BACK TO SIDE WHILE IN FLAT BAD: A LITTLE
WALKING IN HOSPITAL ROOM: A LITTLE
DAILY ACTIVITIY SCORE: 21
HELP NEEDED FOR BATHING: A LITTLE
PERSONAL GROOMING: A LITTLE
MOVING FROM LYING ON BACK TO SITTING ON SIDE OF FLAT BED WITH BEDRAILS: A LITTLE
TOILETING: A LITTLE

## 2024-03-29 ASSESSMENT — LIFESTYLE VARIABLES
HOW MANY STANDARD DRINKS CONTAINING ALCOHOL DO YOU HAVE ON A TYPICAL DAY: PATIENT DOES NOT DRINK
SKIP TO QUESTIONS 9-10: 1
SKIP TO QUESTIONS 9-10: 1
HOW OFTEN DO YOU HAVE A DRINK CONTAINING ALCOHOL: NEVER
HOW OFTEN DO YOU HAVE 6 OR MORE DRINKS ON ONE OCCASION: NEVER
AUDIT-C TOTAL SCORE: 0
AUDIT-C TOTAL SCORE: 0
AUDIT-C TOTAL SCORE: 1

## 2024-03-29 ASSESSMENT — PATIENT HEALTH QUESTIONNAIRE - PHQ9
SUM OF ALL RESPONSES TO PHQ9 QUESTIONS 1 & 2: 0
1. LITTLE INTEREST OR PLEASURE IN DOING THINGS: NOT AT ALL
2. FEELING DOWN, DEPRESSED OR HOPELESS: NOT AT ALL

## 2024-03-29 ASSESSMENT — PAIN SCALES - GENERAL
PAINLEVEL_OUTOF10: 5 - MODERATE PAIN
PAINLEVEL_OUTOF10: 0 - NO PAIN
PAINLEVEL_OUTOF10: 4
PAINLEVEL_OUTOF10: 0 - NO PAIN
PAINLEVEL_OUTOF10: 7

## 2024-03-29 ASSESSMENT — COLUMBIA-SUICIDE SEVERITY RATING SCALE - C-SSRS
1. IN THE PAST MONTH, HAVE YOU WISHED YOU WERE DEAD OR WISHED YOU COULD GO TO SLEEP AND NOT WAKE UP?: NO
2. HAVE YOU ACTUALLY HAD ANY THOUGHTS OF KILLING YOURSELF?: NO
6. HAVE YOU EVER DONE ANYTHING, STARTED TO DO ANYTHING, OR PREPARED TO DO ANYTHING TO END YOUR LIFE?: NO

## 2024-03-29 ASSESSMENT — PAIN - FUNCTIONAL ASSESSMENT
PAIN_FUNCTIONAL_ASSESSMENT: 0-10

## 2024-03-29 ASSESSMENT — PAIN DESCRIPTION - DESCRIPTORS: DESCRIPTORS: DISCOMFORT

## 2024-03-29 NOTE — PROGRESS NOTES
Physical Therapy    Physical Therapy Evaluation & Treatment    Patient Name: Lauren Fields  MRN: [id number]  Today's Date: 3/29/2024   Time Calculation  Start Time: 1715  Stop Time: 1751  Time Calculation (min): 36 min    Assessment/Plan   PT Assessment  PT Assessment Results: Decreased strength, Decreased range of motion, Decreased mobility, Orthopedic restrictions  Rehab Prognosis: Excellent  Evaluation/Treatment Tolerance: Patient tolerated treatment well  Strengths: Ability to acquire knowledge, Access to adaptive/assistive products, Attitude of self, Capable of completing ADLs semi/independent, Insight into problems, Physical health, Premorbid level of function, Rehab experience  End of Session Communication: Bedside nurse  Assessment Comment: Pt low complexity eval presenting with minimal deficits to functional mobility following R JUAN ALBERTO performed on 3/29/2024. Education regarding posterior hip precautions provided; pt verbalized understanding. Therapeutic exercises performed; HEP handout provided. Pt required supervision and minimal verbal cueing from PT during functional transfers and ambulation. Pt is highly motivated to return to premorbid level of function and will have hired help at home upon DC. PT recommends DC home with assist as needed upon completion of stair negotiation. PT will reassess functional status next session.     End of Session Patient Position: Up in chair, Alarm off, not on at start of session with BLE elevated and ice to surgical site. Call light left in reach; patient instructed not to get up on own and verbalized understanding of this. RN aware.    IP OR SWING BED PT PLAN  Inpatient or Swing Bed: Inpatient  PT Plan  Treatment/Interventions: Bed mobility, Transfer training, Gait training, Stair training, Strengthening, Range of motion, Therapeutic exercise, Therapeutic activity, Home exercise program, Positioning  PT Plan: Skilled PT  PT Frequency: BID  PT Discharge Recommendations: Low  intensity level of continued care  Equipment Recommended upon Discharge: Wheeled walker  PT Recommended Transfer Status: Assist x1, Assistive device      Subjective     General Visit Information:  General  Reason for Referral: R JUAN ALBERTO (3/29/2024)  Referred By: Dr. Salazar  Past Medical History Relevant to Rehab: OA, hip bursitis, cervical disc dx, lumbosacral disc dx, spinal stenosis, HLD, anemia, sacral fx, R ITB syndrome, osteopenia, R HL, sciatica; RTC repair, cervical fusion, ETOH use  Family/Caregiver Present: No  Prior to Session Communication: Bedside nurse  Patient Position Received: Bed, 3 rail up  General Comment: Session cleared by RN. Pt very pleasant and agreeable to PT evaluation. Dressing to R hip dry & intact.     Home Living:  Home Living  Type of Home: House  Lives With: Alone (pt reports she hired an aide who will be with her for the first 2 days upon DC)  Home Adaptive Equipment: Walker rolling or standard, Cane, Reacher, Sock aid, Long-handled shoehorn, Long-handled sponge  Home Layout: One level  Home Access: Stairs to enter without rails  Entrance Stairs-Rails: None (B grab bars - cannot reach until at top step)  Entrance Stairs-Number of Steps: 3  Bathroom Shower/Tub: Walk-in shower  Bathroom Toilet: Handicapped height, Adaptive toilet seating  Bathroom Equipment: Grab bars in shower, Shower chair with back  Prior Level of Function:  Prior Function Per Pt/Caregiver Report  Level of [place]: Independent with ADLs and functional transfers, Independent with homemaking with ambulation  ADL Assistance: Independent  Homemaking Assistance: Independent  Ambulatory Assistance: Independent  Vocational: Retired  Prior Function Comments: pt denies falls prior to admission  Precautions:  Precautions  LE Weight Bearing Status: Weight Bearing as Tolerated  Medical Precautions: Fall precautions  Post-Surgical Precautions: Right hip precautions (posterior precautions)    Objective   Pain:  Pain  Assessment  Pain Assessment: 0-10  Pain Score: 0 - No pain  Pain Interventions: Cold applied, Ambulation/increased activity  Cognition:  Cognition  Overall Cognitive Status: Within Functional Limits  Safety/Judgement: Within Functional Limits    General Assessments:  Activity Tolerance  Endurance: Endurance does not limit participation in activity    Sensation  Light Touch: No apparent deficits  Proprioception: No apparent deficits    Coordination  Movements are Fluid and Coordinated: Yes    Postural Control  Postural Control: Within Functional Limits    Static Sitting Balance  Static Sitting-Balance Support: Feet supported, Bilateral upper extremity supported  Static Sitting-Level of Assistance: Independent    Static Standing Balance  Static Standing-Balance Support: Bilateral upper extremity supported  Static Standing-Level of Assistance: Distant supervision  Static Standing-Comment/Number of Minutes: at rolling walker  Dynamic Standing Balance  Dynamic Standing-Balance Support: Bilateral upper extremity supported  Dynamic Standing-Comments: distant supervision with rolling walker  Functional Assessments:  ADL  ADL's Addressed: Yes  Functional Assistance: Stand by  Functional Deficit: Verbal cueing, Supervision/safety, Toilet transfer (PT provided supervision and verbal cues for pt to complete toilet transfer; verbal cues for correct sequencing of movement pt completed with R grab bar use)    Bed Mobility  Bed Mobility: Yes  Bed Mobility 1  Bed Mobility 1: Supine to sitting  Level of Assistance 1: Independent    Transfers  Transfer: Yes  Transfer 1  Transfer From 1: Bed to, Stand to  Transfer to 1: Stand, Chair with arms  Technique 1: Sit to stand, Stand to sit  Transfer Device 1: Walker  Transfer Level of Assistance 1: Distant supervision, Minimal verbal cues  Trials/Comments 1: x2; verbal cues for proper body mechanics to perform within precautions    Ambulation/Gait Training  Ambulation/Gait Training Performed:  Yes  Ambulation/Gait Training 1  Surface 1: Level tile  Device 1: Rolling walker  Assistance 1: Distant supervision  Quality of Gait 1: Decreased step length, Antalgic (reciprocal pattern; decreased michael)  Comments/Distance (ft) 1: 100 feet x2    Extremity/Trunk Assessments:  RUE   RUE : Within Functional Limits  LUE   LUE: Within Functional Limits  RLE   RLE : Exceptions to WFL  AROM RLE (degrees)  RLE AROM Comment: hip flexion to 90 degrees within precautions; decreased hip extension as demonstrated by dec step length during ambulation  Strength RLE  RLE Overall Strength: Greater than or equal to 3/5 as evidenced by functional mobility  LLE   LLE : Within Functional Limits  Treatments:  Therapeutic Exercise  Therapeutic Exercise Performed: Yes B ankle pumps, R quad sets, R gluteal sets, R heel slides, R SAQ, and R hip abduction x 10 reps each.    Outcome Measures:  [facility] Basic Mobility  Turning from your back to your side while in a flat bed without using bedrails: None  Moving from lying on your back to sitting on the side of a flat bed without using bedrails: None  Moving to and from bed to chair (including a wheelchair): None  Standing up from a chair using your arms (e.g. wheelchair or bedside chair): None  To walk in hospital room: None  Climbing 3-5 steps with railing: A little  Basic Mobility - Total Score: 23    Encounter Problems       Encounter Problems (Active)       Balance       LTG - Patient will maintain standing and sitting balance to allow for completion of daily activities (Progressing)       Start:  03/29/24               Compromised Skin Integrity       LTG - Patient will be free from infection (Progressing)       Start:  03/29/24               Mobility       LTG - Patient will recall and demonstrate 3 out of 3 total hip precautions during mobility (Progressing)       Start:  03/29/24            LTG - Patient will ambulate community distance (Progressing)       Start:  03/29/24             LTG - Patient will navigate 3 steps with LRD + HHA (Progressing)       Start:  03/29/24               PT Transfers       LTG - Patient will demonstrate safe transfer techniques (Progressing)       Start:  03/29/24               Pain          Safety       LTG - Patient will adhere to hip precautions during ADL's and transfers (Progressing)       Start:  03/29/24                   Education Documentation  Handouts, taught by Jeri Leung PT at 3/29/2024  6:25 PM.  Learner: Patient  Readiness: Eager  Method: Explanation, Demonstration, Handout  Response: Verbalizes Understanding, Demonstrated Understanding    Precautions, taught by Jeri Leung PT at 3/29/2024  6:25 PM.  Learner: Patient  Readiness: Eager  Method: Explanation, Demonstration, Handout  Response: Verbalizes Understanding, Demonstrated Understanding    Body Mechanics, taught by Jeri Leung PT at 3/29/2024  6:25 PM.  Learner: Patient  Readiness: Eager  Method: Explanation, Demonstration, Handout  Response: Verbalizes Understanding, Demonstrated Understanding    Home Exercise Program, taught by Jeri Leung PT at 3/29/2024  6:25 PM.  Learner: Patient  Readiness: Eager  Method: Explanation, Demonstration, Handout  Response: Verbalizes Understanding, Demonstrated Understanding    Mobility Training, taught by Jeri Leung PT at 3/29/2024  6:25 PM.  Learner: Patient  Readiness: Eager  Method: Explanation, Demonstration, Handout  Response: Verbalizes Understanding, Demonstrated Understanding    Education Comments  No comments found.        Jeri Leung PT, DPT

## 2024-03-29 NOTE — CARE PLAN
Problem: Balance  Goal: LTG - Patient will maintain standing and sitting balance to allow for completion of daily activities  Outcome: Progressing     Problem: Mobility  Goal: LTG - Patient will recall and demonstrate 3 out of 3 total hip precautions during mobility  Outcome: Progressing  Goal: LTG - Patient will ambulate community distance  Outcome: Progressing  Goal: LTG - Patient will navigate 3 steps with LRD + HHA  Outcome: Progressing     Problem: Safety  Goal: LTG - Patient will adhere to hip precautions during ADL's and transfers  Outcome: Progressing     Problem: PT Transfers  Goal: LTG - Patient will demonstrate safe transfer techniques  Outcome: Progressing     Problem: Pain  Goal: LTG - Patient will manage pain with the appropriate technique/Intervention  Outcome: Progressing     Problem: Compromised Skin Integrity  Goal: LTG - Patient will be free from infection  Outcome: Progressing

## 2024-03-29 NOTE — ANESTHESIA POSTPROCEDURE EVALUATION
Patient: Lauren Fields    Procedure Summary       Date: 03/29/24 Room / Location: [facility] / [facility]    Anesthesia Start: 1159 Anesthesia Stop:     Procedure: Total Hip Arthroplasty (Right: Hip) Diagnosis:       Primary osteoarthritis of right hip      (Primary osteoarthritis of right hip [M16.11])    Surgeons: Tyler Salazar DO Responsible Provider: Lambert Moon MD    Anesthesia Type: spinal ASA Status: 2            Anesthesia Type: spinal    Vitals Value Taken Time   /72 03/29/24 1340   Temp 36.1 03/29/24 1340   Pulse 59 03/29/24 1340   Resp 12 03/29/24 1340   SpO2 100 03/29/24 1340       Anesthesia Post Evaluation    Patient location during evaluation: PACU  Patient participation: complete - patient participated  Level of consciousness: awake  Pain management: adequate  Airway patency: patent  Cardiovascular status: acceptable  Respiratory status: acceptable  Hydration status: acceptable  Postoperative Nausea and Vomiting: none        There were no known notable events for this encounter.

## 2024-03-29 NOTE — ANESTHESIA PROCEDURE NOTES
Spinal Block    Patient location during procedure: OR  Start time: 3/29/2024 12:03 PM  End time: 3/29/2024 12:06 PM  Reason for block: primary anesthetic and at surgeon's request  Staffing  Performed: CRNA   Authorized by: Lambert Moon MD    Performed by: ANSELMO Cruz    Preanesthetic Checklist  Completed: patient identified, IV checked, site marked, risks and benefits discussed, surgical consent, monitors and equipment checked, pre-op evaluation, timeout performed and sterile techniques followed  Block Timeout  RN/Licensed healthcare professional reads aloud to the Anesthesia provider and entire team: Patient identity, procedure with side and site, patient position, and as applicable the availability of implants/special equipment/special requirements.  Patient on coagulant treatment: no  Timeout performed at: 3/29/2024 12:02 PM  Spinal Block  Patient position: sitting  Prep: Betadine  Sterility prep: cap, drape, gloves, hand hygiene and mask  Sedation level: light sedation  Patient monitoring: blood pressure, continuous pulse oximetry and heart rate  Approach: midline  Vertebral space: L2-3  Injection technique: single-shot  Needle  Needle type: pencil-point   Needle gauge: 25 G  Needle length: 3.5 in  Free flowing CSF: yes    Assessment  Sensory level: T6 bilateral  Block outcome: patient comfortable  Procedure assessment: patient sedated but conversant throughout procedure and patient tolerated procedure well with no immediate complications  Additional Notes  Injected 9 mg of spinal bupivacaine  Spinal kit lot # [id number]  Exp date 3-31-26

## 2024-03-29 NOTE — PROGRESS NOTES
TCC met with patient at the bedside to discuss discharge plan.  Patient admitted ER following right total hip replacement with Dr. Salazar.  Plan is home tomorrow with [facility] PT/OT. SOC is 3/31/24  Friend will transport home and assist with care as neeeded.   03/29/24 1646   Discharge Planning   Living Arrangements Alone   Support Systems Family members;Friends/neighbors   Assistance Needed mobility and transfers   Type of Residence Private residence   Number of Stairs to Enter Residence 3  (with rail)   Do you have animals or pets at home? No   Who is requesting discharge planning? Provider   Home or Post Acute Services In home services   Type of Home Care Services Home PT;Home OT   Patient expects to be discharged to: Home alone   Does the patient need discharge transport arranged? No   Financial Resource Strain   How hard is it for you to pay for the very basics like food, housing, medical care, and heating? Not hard   Housing Stability   In the last 12 months, was there a time when you were not able to pay the mortgage or rent on time? N   In the last 12 months, how many places have you lived? 1   In the last 12 months, was there a time when you did not have a steady place to sleep or slept in a shelter (including now)? N   Transportation Needs   In the past 12 months, has lack of transportation kept you from medical appointments or from getting medications? no   In the past 12 months, has lack of transportation kept you from meetings, work, or from getting things needed for daily living? No   Patient Choice   Provider Choice list and CMS website (https://medicare.gov/care-compare#search) for post-acute Quality and Resource Measure Data were provided and reviewed with: Patient   Patient / Family choosing to utilize agency / facility established prior to hospitalization Yes

## 2024-03-29 NOTE — HH CARE COORDINATION
Home Care received a Referral for Physical Therapy and Occupational Therapy. We have processed the referral for a Start of Care on 3/31/2024.     If you have any questions or concerns, please feel free to contact us at [phone]. Follow the prompts, enter your five digit zip code, and you will be directed to your care team on [facility].

## 2024-03-29 NOTE — PERIOPERATIVE NURSING NOTE
"Pt on phone. States pain is more tolerable, \"around 5/10\"  Pt tolerating PO intake, no c/o PONV. Pt meets criteria for RNF. All other assessments remain unchanged.  "

## 2024-03-29 NOTE — NURSING NOTE
"Blood pressure (!) 111/49, pulse 50, temperature 36 °C (96.8 °F), temperature source Temporal, resp. rate 18, height 1.575 m (5' 2.01\"), weight 59.6 kg (131 lb 6.3 oz), SpO2 99 %.   Patient got the floor, vital checked. Alert by 3. Oriented to room. Call light within reach. Continue monitor.    "

## 2024-03-29 NOTE — OP NOTE
right TOTAL HIP ARTHROPLASTY     Date: 3/29/2024   OR Location: [facility]    Name: Lauren Fields  : 1950    MRN: [id number]    Diagnosis  Pre-op Diagnosis     * Primary osteoarthritis of right hip [M16.11]  Post-op Diagnosis     * Primary osteoarthritis of right hip [M16.11]      Procedures  Total Hip Arthroplasty  13719 - ND ARTHRP ACETBLR/PROX FEM PROSTC AGRFT/ALGRFT      Surgeons      * Tyler Salazar - Primary     Specimen: none    Staff: Circulator: Veda Vo RN; Naomie London RN  Scrub Person: Georges Sanabria; Deshawn Flood     Drains and/or Catheters: none    Estimated Blood Loss: 50 cc    Resident/Fellow/Other Assistant:  Surgeon(s) and Role:     Procedure Summary  Anesthesia: Spinal    Anesthesia Staff: Anesthesiologist: Lambert Moon MD  CRNA: TROY Cruz-CRNA  Anesthesia Break User: KELLY Wiley   ASA: II       Intra-op Medications:   - 1 Gram Tranexamic acid prior to surgical incision  - 1 Gram Tranexamic acid at start of incision close  - BECK Pain cockail: ropivacaine-epinephrine-clonidine-ketorolac 2.46-0.005- 0.0008-0.3mg/mL periarticular syringe: 50 cc    IMPLANTS:   Implant Name Type Inv. Item Serial No.  Lot No. LRB No. Used Action   SCREW, LOW PROFILE HEX, 6.5 X 25 MM - [id number] Screw SCREW, LOW PROFILE HEX, 6.5 X 25 MM  [employer] [id number] Right 1 Implanted   SHELL, TRIDENT II, TRITANIUM, CLUSTERHOLE, 48D - [id number] Joint Hip SHELL, TRIDENT II, TRITANIUM, CLUSTERHOLE, 48D  [employer] [id number] Right 1 Implanted   LINER, COCR, MDM, 38MM D - [id number] Joint Hip LINER, COCR, MDM, 38MM D  [employer] [id number] Right 1 Implanted   HEAD, FEMUR V40 22.2 COCR LFIT - [id number] Joint HEAD, FEMUR V40 22.2 COCR LFIT  [employer] [id number] Right 1 Implanted   INSERT, MDM X3 RESTORATION, 38OD 22ID X 7.7MM - [id number] Joint INSERT, MDM X3 RESTORATION, 38OD 22ID X 7.7MM  [employer] [id number] Right 1 Implanted   STEM, FEMUR 127D SZ 5 - [id number] Joint STEM, FEMUR 127D SZ 5  [employer] [id number] Right 1 Implanted       Findings: See operative note    Operative Indications:  Lauren Fields is a patient that is well-known to me and initially presented as an outpatient. They have been treated for advanced hip osteoarthritis with therapy, anti-inflammatories, and activity modification, all without improvement of symptoms. They are here for surgery for Pre-op Diagnosis     * Primary osteoarthritis of right hip [M16.11].     We therefore reviewed the alternative option of elective total hip arthroplasty. The risks and benefits of this procedure were discussed in detail as an outpatient and are documented in our outpatient record. The patient expressed full understanding and wished to proceed. Informed consent was obtained and was placed on the medical chart    The risks, benefits and potential complications of the arthroplasty surgery were discussed with the patient in detail.  Risks including but not limited to the risk of anesthesia, DVT, pulmonary embolism with the possibility of death, retained infection, and neurovascular injury.  Specific details of the procedure, hospitalization, recovery, rehabilitation, and long-term precautions were also provided. Pre-operative teaching was provided. Implant/prosthesis selection was outlined, and the many options available were explained; the final choice will be made at the time of the procedure to match the anatomy and condition of the bone, ligaments, tendons, and muscles. Understanding of all topics was conveyed to me by the patient, and consent was given to proceed with a total hip arthroplasty.     The use of robotic assisted surgery was discussed with the patient.  The risk, benefits were discussed regarding this.  Patient consented for this procedure.  We discussed specifically placement of pins and arrays  for navigation during surgery and to help with the robotic assistance.  We discussed the use of an additional bandage to cover the pin sites.  We also reviewed that they may have some slight pain at the placement of pin sites that should improve in the same fashion as their general recovery of the joint replacement. We discussed the term robot, when used to describe the EBENEZER system, refers to the [employer] robotic arm. The EBENEZER System is not a robot, but a surgeon-controlled robotic-arm assisted device.    On the day of surgery the site of surgery was properly noted/marked if necessary per policy. The patient has been actively warmed in preoperative area. Preoperative antibiotics were confirmed and started prior to surgical incision. Venous thrombosis prophylaxis have been ordered including bilateral sequential compression devices to start in pre-operative area.     Procedure In Detail:  The patient was identified in the preoperative area .Their hip was then marked by myself and the patient agreed to proceed with the outlined procedure.. They were transferred to the operating room and positioned supine on the OR table. Appropriate surgical huddle was performed with myself present. Anesthesia was then successfully induced. The patient was then positioned in the lateral decubitus position on a regular OR table utilizing padded hip positioner. All bony prominences were well-padded. An EKG lead was placed on the lateral condyle of the femur to serve as a constant landmark to assess leg length and offset with the robotic software during surgery. The operative lower extremity was then prepped and draped in the normal sterile fashion. A critical pause was taken and we identified the patient, the site of surgery, as well as the administration of preoperative IV antibiotics. The limb was then positioned neutral on a padded Platt stand and bony landmarks were identified on the skin.    We began the procedure by making 3 stab  incisions just proximal to the iliac crest for placement of our raise for the Darrin procedure.  3 pins were placed utilizing the guide an array was fixated in contact with the iliac crest.  A posterior- superior hip incision was then performed with the #10 blade scalpel and a minimally invasive direct superior approach was made. The subcutaneous tissues were developed down to the level of the fascia. Electrocautery was used to achieve hemostasis. The gluteus toño fascia was then divided in line with the skin incision and the fibers of the gluteus toño were split bluntly to the level of the iliotibial band. The exposed pericapsular fat was removed with electrocautery.  A tracking point was placed on the lateral aspect of the greater trochanter.  The leg length and offset was then registered utilizing the Darrin.  The interval between the gluteus medius and the piriformis tendon was then developed. The conjoint tendon was isolated and released from its insertion on the trochanter, tagged and retracted posteriorly, protecting the sciatic nerve through the remainder of the case. The gluteus minimus was elevated from the capsule and a capsulotomy was then performed. Intracapsular retractors were positioned around the femoral neck and the hip was dislocated posteriorly.  The dislocated femoral head was examined and noted to have eburnated bone with minimal cartilage on the weight bearing surface     Utilizing the digital ruler the neck cut that was planned preoperative was marked out.  Using a saw, a provisional femoral neck osteotomy was then performed at a level based on the preoperative template. The femoral head was excised.  The limb was then positioned neutral on a Platt stand and I proceeded with acetabular exposure. A Kaden-type retractor was placed over the anterior column and an inferior capsular retractor was positioned below the acetabular teardrop.  With the acetabulum exposed utilizing single ream  technique the Reamer for corresponding shell was brought in and positioned utilizing the haptic guidance.  The Reamer was then removed and there was good bleeding bed of bone noted and a good hemispherical Reamer in appropriate position.  The corresponding size of the last reamer was used to select a  Trident II hemispherical shell. This was then opened and impacted in approximately 40 degrees of abduction and 20 degrees of anteversion utilizing the haptic guidance to the Darrin arm. The exact specifications for the abduction angle and anteversion were established preoperatively to best match patients anatomical needs in an effort to reduce the risk of post operative dislocation and psoas impingement on the implant. The polyethylene liner was then impacted into the shell.     The retractors were then position of the proximal femur and attention was drawn towards the femur. The femur was delivered into the wound and a box chisel was placed into the piriformis fossa. A canal awl was placed down the canal without resistance. Using the Accolade Broach System, the femur was prepared to accept a broach with good fill and rotational stability. With this in place, there was good rotational and axial stability. The broach was noted to seat at the level of the calcar resection.  We distally had the same broach size and placement relative to the neck compared to our preoperative plan utilizing the [employer] software.  No fractures were identified.     Multiple trial head and neck combinations were then made and the reduced hip was stable to 90 degrees of flexion, 70 degrees of internal rotation, and 20 degrees of adduction. The hip could be fully extended, externally rotated, and abducted without any anterior dislocation. The leg lengths were restored equally. Intraoperative radiographs were then obtained which demonstrated satisfactory positioning of the components. No fractures were identified.  Utilizing the robotic technology our  leg length was checked and noted to be restored to the contralateral side and offset restored as well.  Satisfied with the reconstruction, the hip was dislocated and the femoral trials were removed. The final femoral stem and head were then impacted without complication. The hip was reduced one final time and all of the tissues were thoroughly irrigated.  Tracker was removed from the lateral aspect of the greater trochanter and pins for the array removed as well.  An anesthetic injection cocktail was infiltrated throughout the soft tissues. The capsule was repaired anatomically with #1 Vicryl suture. The conjoint tendon was approximated to the posterior edge of the trochanter with #1 Vicryl suture and the gluteal fascia was repaired with interrupted suture. The rest of the incision was closed in layers with a final layer of monocryl and skin glue. Occlusive dresssing was then applied. The drapes were then removed. The patient was then transferred to the PACU in stable condition. All sponge and needed counts were correct at the end of the case.     Complications:  None; patient tolerated the procedure well.    Disposition: PACU - hemodynamically stable.  Condition: stable      Plan:                         Weight Bearing Status:  WBAT Bilateral LE                        Range of Motion: As tolerated                        Ceballos: none placed                        Dressing: Maintain for one week                        DVT Prophylaxis:  ASA 81mg BID x 4 weeks                         Antibiotics: Continue x24 hours sissy-operatively                         Discharge/Follow-up: Follow up in clinic in two weeks      Tyler Salazar DO  Attending Surgeon  Joint Replacement and Adult Reconstructive Surgery  [place], [place]      Attending Attestation: I was present and scrubbed for the entire procedure.    This note was dictated using speech recognition software and was not corrected for spelling or  grammatical errors

## 2024-03-29 NOTE — CONSULTS
Inpatient consult to Medicine  Consult performed by: Marzena Donaldson PA-C  Consult ordered by: Tyler Salazar DO  Reason for consult: post-op pain, prediabetes        History and Physical    Lauren Fields is a 74 y.o. female on day 0 of admission presenting with Primary osteoarthritis of right hip.  Room: 222    Subjective   74-year-old female with with a past medical history of multiple spine surgeries for disc herniations and stenosis, prediabetes, HSV on prophylaxic Valtrex, is status post right total hip replacement currently she is hemodynamically stable, but slightly bradycardic.     ROS: H/o calf cramping, paresthesias in the left lower extremity and history of left sciatic like pain in the left lower extremity  PMHx:  Past Medical History:   Diagnosis Date    Herpes simplex     HL (hearing loss) 2020    HNP (herniated nucleus pulposus), cervical     s/p cervical fusions x2    Hyperlipidemia     Lumbosacral disc disease 2009    Osteoarthritis     Osteopenia     Prediabetes     A1c 5.8 (12/2023)    Spinal stenosis     w/ radiculopathy s/p lumbar fusions x2        Past Surgical Hx:  Past Surgical History:  No date: BREAST CYST ASPIRATION      Comment:  Numerous  No date: CATARACT EXTRACTION  11/05/2015: CERVICAL FUSION      Comment:  C4 - C7 anterior cervical decompression and fusion  08/09/2016: CERVICAL FUSION      Comment:  C3-C4 disk herniation, C3-C6 fusion  10/27/2020: COLONOSCOPY W/ POLYPECTOMY      Comment:  Mild diverticulosis  11/10/2021: LUMBAR FUSION      Comment:  L3-5 decompression, L4-5 fusion  05/19/2023: LUMBAR FUSION      Comment:  Lumbar re-exploration fusion L4-5, L3-5 decompression                fusion  03/29/2022: ROTATOR CUFF REPAIR; Right      Comment:  and biceps tenodesis @ [facility]  No date: TONSILLECTOMY  03/29/2024: TOTAL HIP ARTHROPLASTY; Right  No date: TRIGGER FINGER RELEASE      Comment:  Multiple  upper back: TRIGGER POINT INJECTION  08/24/2015: ULNAR TUNNEL RELEASE;  "Left    Social history:   reports that she has never smoked. She has been exposed to tobacco smoke. She has never used smokeless tobacco. She reports current alcohol use of about 1.0 standard drink of alcohol per week. She reports that she does not use drugs.    Family history: breast cancer    Allergies   Allergen Reactions    Hydromorphone Nausea Only    Acetaminophen Rash    Amoxicillin-Pot Clavulanate Rash    Penicillins Rash     Home Medication:   Medication Details   carboxymethylcellulose sodium (Refresh Contacts) drops OPHTHalmic solution 1 drop 3 times a day.   diclofenac sodium (Voltaren) 1 % gel gel Apply 4.5 inches (4 g) topically 4 times a day as needed.   estrogens, conjugated, (Premarin) vaginal cream Insert into the vagina. Twice a week   fluticasone (Flonase) 50 mcg/actuation nasal spray Administer into affected nostril(s) once daily as needed for allergies.   valACYclovir (Valtrex) 500 mg tablet Take 1 tablet (500 mg) by mouth once daily.   cholecalciferol (Vitamin D-3) 25 MCG (1000 UT) capsule Take 1 capsule (25 mcg) by mouth once daily.   multivitamin-min-iron-FA-vit K (Adults Multivitamin) 18 mg iron-400 mcg-25 mcg tablet Take 1 tablet by mouth once daily.   TURMERIC, BULK, MISC Take by mouth once daily.     Last Recorded Vitals  Blood pressure 112/57, pulse 58, temperature 36.1 °C (97 °F), resp. rate 18, height 1.575 m (5' 2.01\"), weight 59.6 kg (131 lb 6.3 oz), SpO2 98 %.    Physical Exam  General: Patient in no acute distress, lying in bed  HEENT: EOMI, moist mucous membranes, anicteric  Neck: No JVD, no cervical lymphadenopathy  Heart: Bradycardic, regular rhythm, no murmurs, rubs, or gallops  Lungs: Clear to auscultation bilaterally, no wheezing, rhonchi, or rales  Abdomen: Soft, nontender, nondistended, no organomegaly  Extremities: No peripheral edema, able to pump bilateral ankles  Skin: No rash or cyanosis  Neurological: AOx3, non focal  Psychiatric: Cooperative and pleasant    Recent " Results:   Latest Reference Range & Units 03/14/24 15:33   GLUCOSE 74 - 99 mg/dL 94   SODIUM 136 - 145 mmol/L 138   POTASSIUM 3.5 - 5.3 mmol/L 4.3   CHLORIDE 98 - 107 mmol/L 102   Bicarbonate 21 - 32 mmol/L 28   Anion Gap 10 - 20 mmol/L 12   Blood Urea Nitrogen 6 - 23 mg/dL 21   Creatinine 0.50 - 1.05 mg/dL 0.87   EGFR >60 mL/min/1.73m*2 70   Calcium 8.6 - 10.3 mg/dL 9.8   WBC 4.4 - 11.3 x10*3/uL 6.3   nRBC  COMMENT ONLY   RBC 4.00 - 5.20 x10*6/uL 3.90 (L)   HEMOGLOBIN 12.0 - 16.0 g/dL 12.4   HEMATOCRIT 36.0 - 46.0 % 36.2   MCV 80 - 100 fL 93   MCH 26.0 - 34.0 pg 31.8   MCHC 32.0 - 36.0 g/dL 34.3   RED CELL DISTRIBUTION WIDTH 11.5 - 14.5 % 12.4   Platelets 150 - 450 x10*3/uL 255      Latest Reference Range & Units 12/22/23 08:25   Hemoglobin A1C see below % 5.8 (H)       Assessment/Plan   1) status post right total hip replacement   Ortho management   PT eval/treat  Incentive spirometry  BM management  Supportive Care  Antibiotic prophylaxis per ortho  DVT prophylaxis per ortho  2) VT prophylaxis   Per Ortho management  81 mg ASA BID  SCDs  Ambulate as tolerated  3) prediabetes   Low-carb diet         I spent 45 minutes in the professional and overall care of this patient.      Marzena Donaldson PA-C

## 2024-03-29 NOTE — NURSING NOTE
Admission.  Vital signs obtained.  Pt oriented to the room.  Pt. Encouraged to order breakfast.  Scd's on.  Call light within reach.

## 2024-03-29 NOTE — NURSING NOTE
Received report from Ventura JULIO. Pt was in chair and called to go to the restroom and ventura assisted with that as I went and refilled her ice pack. Pt was not in any pain.

## 2024-03-30 VITALS
TEMPERATURE: 97.9 F | OXYGEN SATURATION: 100 % | HEIGHT: 62 IN | WEIGHT: 131.39 LBS | SYSTOLIC BLOOD PRESSURE: 110 MMHG | HEART RATE: 57 BPM | BODY MASS INDEX: 24.18 KG/M2 | RESPIRATION RATE: 16 BRPM | DIASTOLIC BLOOD PRESSURE: 56 MMHG

## 2024-03-30 LAB
ANION GAP SERPL CALC-SCNC: 12 MMOL/L (ref 10–20)
BUN SERPL-MCNC: 13 MG/DL (ref 6–23)
CALCIUM SERPL-MCNC: 8.8 MG/DL (ref 8.6–10.3)
CHLORIDE SERPL-SCNC: 105 MMOL/L (ref 98–107)
CO2 SERPL-SCNC: 28 MMOL/L (ref 21–32)
CREAT SERPL-MCNC: 0.83 MG/DL (ref 0.5–1.05)
EGFRCR SERPLBLD CKD-EPI 2021: 74 ML/MIN/1.73M*2
ERYTHROCYTE [DISTWIDTH] IN BLOOD BY AUTOMATED COUNT: 12.8 % (ref 11.5–14.5)
GLUCOSE SERPL-MCNC: 104 MG/DL (ref 74–99)
HCT VFR BLD AUTO: 32.9 % (ref 36–46)
HGB BLD-MCNC: 10.9 G/DL (ref 12–16)
MAGNESIUM SERPL-MCNC: 2.15 MG/DL (ref 1.6–2.4)
MCH RBC QN AUTO: 31.8 PG (ref 26–34)
MCHC RBC AUTO-ENTMCNC: 33.1 G/DL (ref 32–36)
MCV RBC AUTO: 96 FL (ref 80–100)
NRBC BLD-RTO: ABNORMAL /100{WBCS}
PLATELET # BLD AUTO: 197 X10*3/UL (ref 150–450)
POTASSIUM SERPL-SCNC: 4.5 MMOL/L (ref 3.5–5.3)
RBC # BLD AUTO: 3.43 X10*6/UL (ref 4–5.2)
SODIUM SERPL-SCNC: 140 MMOL/L (ref 136–145)
WBC # BLD AUTO: 5.3 X10*3/UL (ref 4.4–11.3)

## 2024-03-30 PROCEDURE — 7100000011 HC EXTENDED STAY RECOVERY HOURLY - NURSING UNIT

## 2024-03-30 PROCEDURE — 83735 ASSAY OF MAGNESIUM: CPT | Performed by: PHYSICIAN ASSISTANT

## 2024-03-30 PROCEDURE — 97116 GAIT TRAINING THERAPY: CPT | Mod: GP

## 2024-03-30 PROCEDURE — 97530 THERAPEUTIC ACTIVITIES: CPT | Mod: GP

## 2024-03-30 PROCEDURE — 36415 COLL VENOUS BLD VENIPUNCTURE: CPT | Performed by: PHYSICIAN ASSISTANT

## 2024-03-30 PROCEDURE — 2500000002 HC RX 250 W HCPCS SELF ADMINISTERED DRUGS (ALT 637 FOR MEDICARE OP, ALT 636 FOR OP/ED): Performed by: PHYSICIAN ASSISTANT

## 2024-03-30 PROCEDURE — 85027 COMPLETE CBC AUTOMATED: CPT | Performed by: PHYSICIAN ASSISTANT

## 2024-03-30 PROCEDURE — 2500000004 HC RX 250 GENERAL PHARMACY W/ HCPCS (ALT 636 FOR OP/ED): Performed by: FAMILY MEDICINE

## 2024-03-30 PROCEDURE — 80048 BASIC METABOLIC PNL TOTAL CA: CPT | Performed by: PHYSICIAN ASSISTANT

## 2024-03-30 PROCEDURE — 2500000004 HC RX 250 GENERAL PHARMACY W/ HCPCS (ALT 636 FOR OP/ED): Performed by: ORTHOPAEDIC SURGERY

## 2024-03-30 PROCEDURE — 2500000001 HC RX 250 WO HCPCS SELF ADMINISTERED DRUGS (ALT 637 FOR MEDICARE OP): Performed by: ORTHOPAEDIC SURGERY

## 2024-03-30 RX ORDER — NAPROXEN SODIUM 220 MG/1
81 TABLET, FILM COATED ORAL 2 TIMES DAILY
Qty: 60 TABLET | Refills: 0 | Status: SHIPPED | OUTPATIENT
Start: 2024-03-30 | End: 2024-04-29

## 2024-03-30 RX ORDER — PANTOPRAZOLE SODIUM 40 MG/1
40 TABLET, DELAYED RELEASE ORAL DAILY PRN
Qty: 30 TABLET | Refills: 0 | Status: SHIPPED | OUTPATIENT
Start: 2024-03-30 | End: 2024-05-08 | Stop reason: WASHOUT

## 2024-03-30 RX ORDER — CELECOXIB 200 MG/1
200 CAPSULE ORAL 2 TIMES DAILY
Qty: 60 CAPSULE | Refills: 0 | Status: SHIPPED | OUTPATIENT
Start: 2024-03-30 | End: 2024-04-29

## 2024-03-30 RX ORDER — OXYCODONE HYDROCHLORIDE 5 MG/1
5 TABLET ORAL EVERY 6 HOURS PRN
Qty: 28 TABLET | Refills: 0 | Status: SHIPPED | OUTPATIENT
Start: 2024-03-30 | End: 2024-04-06

## 2024-03-30 RX ORDER — CYCLOBENZAPRINE HCL 5 MG
5 TABLET ORAL 3 TIMES DAILY PRN
Qty: 30 TABLET | Refills: 0 | Status: SHIPPED | OUTPATIENT
Start: 2024-03-30 | End: 2024-04-09

## 2024-03-30 RX ORDER — TRAMADOL HYDROCHLORIDE 50 MG/1
50 TABLET ORAL EVERY 6 HOURS PRN
Qty: 28 TABLET | Refills: 0 | Status: SHIPPED | OUTPATIENT
Start: 2024-03-30 | End: 2024-04-06

## 2024-03-30 RX ORDER — SODIUM CHLORIDE 9 MG/ML
250 INJECTION, SOLUTION INTRAVENOUS CONTINUOUS
Status: DISCONTINUED | OUTPATIENT
Start: 2024-03-30 | End: 2024-03-30 | Stop reason: HOSPADM

## 2024-03-30 RX ORDER — SENNOSIDES 8.6 MG/1
1 TABLET ORAL 2 TIMES DAILY
Qty: 60 TABLET | Refills: 0 | Status: SHIPPED | OUTPATIENT
Start: 2024-03-30 | End: 2024-04-29

## 2024-03-30 RX ADMIN — KETOROLAC TROMETHAMINE 15 MG: 15 INJECTION, SOLUTION INTRAMUSCULAR; INTRAVENOUS at 04:13

## 2024-03-30 RX ADMIN — PANTOPRAZOLE SODIUM 40 MG: 40 TABLET, DELAYED RELEASE ORAL at 06:44

## 2024-03-30 RX ADMIN — KETOROLAC TROMETHAMINE 15 MG: 15 INJECTION, SOLUTION INTRAMUSCULAR; INTRAVENOUS at 10:17

## 2024-03-30 RX ADMIN — SENNOSIDES 17.2 MG: 8.6 TABLET, FILM COATED ORAL at 08:27

## 2024-03-30 RX ADMIN — SODIUM CHLORIDE 250 ML/HR: 900 INJECTION, SOLUTION INTRAVENOUS at 08:28

## 2024-03-30 RX ADMIN — ASPIRIN 81 MG: 81 TABLET, COATED ORAL at 08:27

## 2024-03-30 RX ADMIN — VALACYCLOVIR HYDROCHLORIDE 500 MG: 500 TABLET, FILM COATED ORAL at 08:27

## 2024-03-30 RX ADMIN — OXYCODONE 5 MG: 5 TABLET ORAL at 10:17

## 2024-03-30 RX ADMIN — SODIUM CHLORIDE, SODIUM LACTATE, POTASSIUM CHLORIDE, AND CALCIUM CHLORIDE 100 ML/HR: 600; 310; 30; 20 INJECTION, SOLUTION INTRAVENOUS at 06:43

## 2024-03-30 RX ADMIN — CEFAZOLIN SODIUM 2 G: 2 INJECTION, SOLUTION INTRAVENOUS at 04:13

## 2024-03-30 ASSESSMENT — PAIN SCALES - GENERAL
PAINLEVEL_OUTOF10: 3
PAINLEVEL_OUTOF10: 0 - NO PAIN
PAINLEVEL_OUTOF10: 4
PAINLEVEL_OUTOF10: 1

## 2024-03-30 ASSESSMENT — COGNITIVE AND FUNCTIONAL STATUS - GENERAL
CLIMB 3 TO 5 STEPS WITH RAILING: A LITTLE
MOBILITY SCORE: 24
MOBILITY SCORE: 23
TOILETING: A LITTLE
DRESSING REGULAR LOWER BODY CLOTHING: A LITTLE
DAILY ACTIVITIY SCORE: 21
HELP NEEDED FOR BATHING: A LITTLE

## 2024-03-30 ASSESSMENT — PAIN - FUNCTIONAL ASSESSMENT
PAIN_FUNCTIONAL_ASSESSMENT: 0-10

## 2024-03-30 NOTE — DISCHARGE SUMMARY
Discharge Diagnosis  Right Total Hip Replacement    Issues Requiring Follow-Up  Home care services to start within 48 hours. Outpatient PT to start per surgeon instructions.    Test Results Pending At Discharge  Pending Labs       No current pending labs.            Hospital Course  Patient underwent surgery for Right Total Hip Replacement without complications. Patient was then takent to the PACU in stabe condition. Patient was then transferred to the [facility].  Pain was appropriately controlled and weaned to oral medications. Diet was advanced as tolerated. PT/OT was consulted to begin on post operative day 0 and recommended Home for patient on discharge. Patient had uneventful hospital course. Patient is to follow-up in 6 weeks at scheduled post-op visit.      Face to Face following surgical procedure on 03/30/24. The patient was awake and alert. VSS, afebrile. Sensation intact bilaterally, sural/saph/sp/tibal n. Motor intact flexion/extension/DF/PF/EHL/FHL bilaterally. Palpable symmetric DP/PT pulse bilaterally.    Pertinent Physical Exam At Time of Discharge  Physical Exam  Vitals and nursing note reviewed. Exam conducted with a chaperone present.   Constitutional:       Appearance: Normal appearance.   HENT:      Head: Normocephalic and atraumatic.   Eyes:      Extraocular Movements: Extraocular movements intact.   Cardiovascular:      Rate and Rhythm: Normal rate and regular rhythm.      Pulses: Normal pulses.      Heart sounds: Normal heart sounds.   Pulmonary:      Effort: Pulmonary effort is normal.   Abdominal:      Palpations: Abdomen is soft.   Musculoskeletal:      GENERAL: A/Ox3, NAD. Appears healthy, well nourished  CARDIAC: regular rate  LUNGS: Breathing non-labored    MUSCULOSKELETAL:  Laterality: right  hip  - Incision: dressing in place with no saturation  - Palpation: appropriate post operative TTP along surgical incision  - Can actively straight leg raise  - compartments soft, negative  homans    NEUROVASCULAR:  - Neurovascular Status: sensation intact to light touch distally  - Capillary refill brisk at extremities, Bilateral dorsalis pedis pulse 2+      Skin:     General: Skin is warm and dry.      Capillary Refill: Capillary refill takes less than 2 seconds.   Neurological:      General: No focal deficit present.      Mental Status: Patient is alert and oriented to person, place, and time. Mental status is at baseline.   Psychiatric:         Mood and Affect: Mood normal.         Thought Content: Thought content normal.         Judgment: Judgment normal.           Home Medications  Medication List      Your medication list        START taking these medications        Instructions Last Dose Given Next Dose Due   aspirin 81 mg chewable tablet      Chew 1 tablet (81 mg) 2 times a day. To prevent blood clots       celecoxib 200 mg capsule  Commonly known as: CeleBREX      Take 1 capsule (200 mg) by mouth 2 times a day. For pain, inflammation, and swelling       cyclobenzaprine 5 mg tablet  Commonly known as: Flexeril      Take 1 tablet (5 mg) by mouth 3 times a day as needed for muscle spasms for up to 10 days.       oxyCODONE 5 mg immediate release tablet  Commonly known as: Roxicodone      Take 1 tablet (5 mg) by mouth every 6 hours if needed for severe pain (7 - 10) for up to 7 days.       pantoprazole 40 mg EC tablet  Commonly known as: ProtoNix      Take 1 tablet (40 mg) by mouth once daily as needed (heartburn). Do not crush, chew, or split.       sennosides 8.6 mg tablet  Commonly known as: Senokot      Take 1 tablet (8.6 mg) by mouth 2 times a day. To prevent constipation       traMADol 50 mg tablet  Commonly known as: Ultram      Take 1 tablet (50 mg) by mouth every 6 hours if needed for severe pain (7 - 10) for up to 7 days.              CONTINUE taking these medications        Instructions Last Dose Given Next Dose Due   Adults Multivitamin tablet  Generic drug: multivitamin with  minerals           cholecalciferol 25 MCG (1000 UT) capsule  Commonly known as: Vitamin D-3           estrogens (conjugated) vaginal cream  Commonly known as: Premarin           fluticasone 50 mcg/actuation nasal spray  Commonly known as: Flonase           Refresh Contacts drops OPHTHalmic solution  Generic drug: carboxymethylcellulose sodium           TURMERIC (BULK) MISC           valACYclovir 500 mg tablet  Commonly known as: Valtrex      Take 1 tablet (500 mg) by mouth once daily.       Voltaren 1 % gel  Generic drug: diclofenac sodium                  STOP taking these medications      chlorhexidine 0.12 % solution  Commonly known as: Peridex                  Where to Get Your Medications        These medications were sent to  [facility]  [address] , [address], [facility] [address]      Hours: 8 AM to 6 PM Mon-Fri, 9 AM to 1 PM Saturday Phone: [phone]   aspirin 81 mg chewable tablet  celecoxib 200 mg capsule  cyclobenzaprine 5 mg tablet  oxyCODONE 5 mg immediate release tablet  pantoprazole 40 mg EC tablet  sennosides 8.6 mg tablet  traMADol 50 mg tablet             Outpatient Follow-Up: three weeks  OFFICE LOCATIONS    Location 1: [facility]  [address] [facility], [address]  Office Number: [phone]    Location 2: [facility]  [address], [address]  Office Number: [phone]    Location 3: [address] [address]  Office Number: [phone]        Please read discharge instructions provided by your surgeon before calling with questions as this will delay care.    Medication refills-Oxycodone and Tramadol will be refilled every 7 days per state law. Please request refills through [employer] preferably/[phone]. All medication requests may take up to 72 hours to refill and refills after Friday 1pm will be refilled on the next business day.

## 2024-03-30 NOTE — NURSING NOTE
Pt's BP is 90/44 mmHg. MD Rogers notified at this time and per order NS bolus started and infusing. Will recheck BP once completed. Pt denies any dizziness or other symptoms.

## 2024-03-30 NOTE — PROGRESS NOTES
"  Progress Note:    Lauren Fields is a 74 y.o. female on day 2 of admission presenting with Primary osteoarthritis of right hip.    Subjective   Mrs. Fields reports no pain of the right hip currently.  She has been up to the bathroom.  States she slept pretty well.     ROS  Constitutional:  Alert, oriented  HEENT: Denies headache, vision changes, hearing change, loss of taste or smell. Does complain of sore throat (post op)   Neck: Denies swallowing difficulty, swelling, new stiffness/pain  CV: Denies CP, Palpitations, chest wall pain  Resp: No cough, wheeze, dyspnea  Abdomen: Denies abdominal pain, cramping, constipation, diarrhea  : No dysuria, hematuria, discharge  Musculoskeletal: Aches over all joints (not new). Generalized weakness 2/2 pain  Extremities: Swelling of RLE  Neuro: No focal deficits other than mild [place]    Scheduled medications  aspirin, 81 mg, oral, BID  ketorolac, 15 mg, intravenous, q6h  pantoprazole, 40 mg, oral, Daily before breakfast  scopolamine, 1 patch, transdermal, q72h  sennosides, 2 tablet, oral, BID  valACYclovir, 500 mg, oral, Daily      Continuous medications  lactated Ringer's, 100 mL/hr, Last Rate: 100 mL/hr (03/30/24 0643)  sodium chloride 0.9%, 250 mL/hr, Last Rate: 250 mL/hr (03/30/24 0828)      PRN medications  PRN medications: bisacodyl, cyclobenzaprine, diphenhydrAMINE, diphenhydrAMINE, metoclopramide **OR** metoclopramide, naloxone, naloxone, ondansetron ODT **OR** ondansetron, oxyCODONE, oxyCODONE, oxygen      Physical Exam  General: No acute distress, alert & oriented  Cardiac: RRR, NL S1 and S2, no murmurs, rubs or gallops  Pulmonary: Lungs clear to auscultation bilaterally, no wheezes, rhales or rhonchi  Abdomen: Soft, non-tender, non-distended  Extremities: No clubbing , cyanosis or edema.     Last Recorded Vitals  Blood pressure (!) 90/44, pulse 66, temperature 36.6 °C (97.9 °F), temperature source Temporal, resp. rate 16, height 1.575 m (5' 2.01\"), weight 59.6 kg " (131 lb 6.3 oz), SpO2 98 %.    Scheduled medications   Medication Dose Route Frequency    aspirin  81 mg oral BID    ketorolac  15 mg intravenous q6h    pantoprazole  40 mg oral Daily before breakfast    scopolamine  1 patch transdermal q72h    sennosides  2 tablet oral BID    valACYclovir  500 mg oral Daily       Relevant Results  Results from last 7 days   Lab Units 03/30/24  0428   WBC AUTO x10*3/uL 5.3   HEMOGLOBIN g/dL 10.9*   HEMATOCRIT % 32.9*   PLATELETS AUTO x10*3/uL 197      Results from last 7 days   Lab Units 03/30/24  0428   SODIUM mmol/L 140   POTASSIUM mmol/L 4.5   CHLORIDE mmol/L 105   CO2 mmol/L 28   BUN mg/dL 13   CREATININE mg/dL 0.83   GLUCOSE mg/dL 104*   CALCIUM mg/dL 8.8      Results for orders placed or performed during the hospital encounter of 03/29/24 (from the past 24 hour(s))   Magnesium   Result Value Ref Range    Magnesium 2.15 1.60 - 2.40 mg/dL   CBC   Result Value Ref Range    WBC 5.3 4.4 - 11.3 x10*3/uL    nRBC      RBC 3.43 (L) 4.00 - 5.20 x10*6/uL    Hemoglobin 10.9 (L) 12.0 - 16.0 g/dL    Hematocrit 32.9 (L) 36.0 - 46.0 %    MCV 96 80 - 100 fL    MCH 31.8 26.0 - 34.0 pg    MCHC 33.1 32.0 - 36.0 g/dL    RDW 12.8 11.5 - 14.5 %    Platelets 197 150 - 450 x10*3/uL   Basic Metabolic Panel   Result Value Ref Range    Glucose 104 (H) 74 - 99 mg/dL    Sodium 140 136 - 145 mmol/L    Potassium 4.5 3.5 - 5.3 mmol/L    Chloride 105 98 - 107 mmol/L    Bicarbonate 28 21 - 32 mmol/L    Anion Gap 12 10 - 20 mmol/L    Urea Nitrogen 13 6 - 23 mg/dL    Creatinine 0.83 0.50 - 1.05 mg/dL    eGFR 74 >60 mL/min/1.73m*2    Calcium 8.8 8.6 - 10.3 mg/dL      Assessment/Plan   1) Osteoarthritis Right Hip: POD#1 Right THR:  Perioperative antibiotics, dressing care, pain meds, PT/OT per Orthopaedics   Patient seen, examined. Notes, labs, vitals reviewed.  The patient is medically cleared for discharge    2) DVT Prophylaxis:   Rx: Aspirin 81 mg q 12 hr x 30 days. Stop date: April 28     3) Prediabetes:   Last  HbA1c 5.8 Dec 22, 2023   Advised: Follow up w/ PCP for ongoing mgmt     4) Disposition:   Discharge Medication Reconciliation reviewed, ordered.    Plan discharge later this morning.             I spent 35 minutes in the professional and overall care of this patient.      Kris Rogers MD

## 2024-03-30 NOTE — PROGRESS NOTES
Physical Therapy    Physical Therapy Treatment    Patient Name: Lauren Fields  MRN: [id number]  Today's Date: 3/30/2024  Time Calculation  Start Time: 1045  Stop Time: 1139  Time Calculation (min): 54 min       Assessment/Plan   PT Assessment  PT Assessment Results: Decreased strength, Decreased range of motion, Decreased mobility, Orthopedic restrictions, Pain  Rehab Prognosis: Excellent  Evaluation/Treatment Tolerance: Patient tolerated treatment well  Strengths: Ability to acquire knowledge, Access to adaptive/assistive products, Attitude of self, Capable of completing ADLs semi/independent, Insight into problems, Physical health, Premorbid level of function, Rehab experience  End of Session Communication: Bedside nurse  Assessment Comment: Pt presenting for follow-up of R JUAN ALBERTO performed on 3/29/2024. Pt is progressing well towards functional goals this date. Education regarding hip precautions reviewed with pt who verbalized and demonstrated understanding throughout session. Pt reports being compliant with HEP and having a good understanding of her exercises. PT provided distant supervision during functional mobility with pt performing transfers and ambulation within hip precautions. Pt will have hired help at home and remains highly motivated to return to premorbid level of function. PT recommends DC home with HHPT and assist as needed.    End of Session Patient Position: Up in chair, Alarm off, not on at start of session with BLE elevated and ice to surgical site. Call light left in reach; patient instructed not to get up on own and verbalized understanding of this. RN aware.    PT Plan  Inpatient/Swing Bed or Outpatient: Inpatient  PT Plan  Treatment/Interventions: Bed mobility, Transfer training, Gait training, Stair training, Strengthening, Range of motion, Therapeutic exercise, Therapeutic activity, Home exercise program, Positioning  PT Plan: Skilled PT  PT Frequency: BID  PT Discharge Recommendations: Low  intensity level of continued care  Equipment Recommended upon Discharge: Wheeled walker  PT Recommended Transfer Status: Independent, Assistive device  PT - OK to Discharge: Yes      General Visit Information:   PT  Visit  PT Received On: 03/30/24  Response to Previous Treatment: Patient with no complaints from previous session., Compliant with home exercise program  General  Reason for Referral: R JUAN ALBERTO (3/29/2024)  Referred By: Dr. Salazar  Past Medical History Relevant to Rehab: OA, hip bursitis, cervical disc dx, lumbosacral disc dx, spinal stenosis, HLD, anemia, sacral fx, R ITB syndrome, osteopenia, R HL, sciatica; RTC repair, cervical fusion, ETOH use  Family/Caregiver Present: No  Prior to Session Communication: Bedside nurse  Patient Position Received: Bed, 3 rail up  General Comment: Session cleared by RN. Pt very pleasant and agreeable to PT treatment. Dressing to R hip remains dry & intact.       Subjective   Precautions:  Precautions  LE Weight Bearing Status: Weight Bearing as Tolerated  Medical Precautions: Fall precautions  Post-Surgical Precautions: Right hip precautions (posterior)    Vital Signs:  Vital Signs  Heart Rate: 57 (72 standing EOB)  Heart Rate Source: Brachial  SpO2: 100 %  BP: (!) 123/49 (128/69 standing EOB)  MAP (mmHg): 63 (84 standing EOB)  BP Location: Left arm  BP Method: Automatic  Patient Position: Sitting    Objective   Pain:  Pain Assessment  Pain Assessment: 0-10  Pain Score: 3  Pain Type: Surgical pain  Pain Location: Hip  Pain Orientation: Right  Pain Interventions: Medication (See MAR), Cold applied, Ambulation/increased activity, Elevated (RN admin pain meds just prior to PT arrival)  Cognition:  Cognition  Overall Cognitive Status: Within Functional Limits  Safety/Judgement: Within Functional Limits  Postural Control:  Postural Control  Postural Control: Within Functional Limits  Static Sitting Balance  Static Sitting-Balance Support: Feet supported, Bilateral upper  extremity supported  Static Sitting-Level of Assistance: Independent  Dynamic Sitting Balance  Dynamic Sitting-Balance Support: Feet supported  Dynamic Sitting-Comments: distant supervision seated EOB  Static Standing Balance  Static Standing-Balance Support: Bilateral upper extremity supported  Static Standing-Level of Assistance: Distant supervision  Static Standing-Comment/Number of Minutes: at rolling walker  Dynamic Standing Balance  Dynamic Standing-Balance Support: Bilateral upper extremity supported  Dynamic Standing-Comments: distant supervision at rolling walker  Extremity/Trunk Assessments:  RUE   RUE : Within Functional Limits  LUE   LUE: Within Functional Limits  RLE   RLE : Exceptions to WFL  AROM RLE (degrees)  RLE AROM Comment: hip flexion to 90 degrees within precautions; decreased hip extension during ambulation  Strength RLE  RLE Overall Strength: Greater than or equal to 3/5 as evidenced by functional mobility  LLE   LLE : Within Functional Limits  Activity Tolerance:  Activity Tolerance  Endurance: Endurance does not limit participation in activity  Treatments:  Therapeutic Exercise  Therapeutic Exercise Performed: No Pt reports performing bed exercises just prior to PT arrival and having a good understanding of them.    Therapeutic Activity  Therapeutic Activity Performed: Yes  Therapeutic Activity 1: PT assisted with completion of LE dressing while pt seated at EOB; pt verbalized understanding to dress surgical limb first using ADL equipment or assist at home  Therapeutic Activity 2: PT provided supervision and cues for sequencing during toilet transfer; pt completed within precautions with R grab bar use    Bed Mobility  Bed Mobility: Yes  Bed Mobility 1  Bed Mobility 1: Supine to sitting  Level of Assistance 1: Independent    Ambulation/Gait Training  Ambulation/Gait Training Performed: Yes  Ambulation/Gait Training 1  Surface 1: Level tile  Device 1: Rolling walker  Assistance 1: Distant  "supervision  Quality of Gait 1: Decreased step length, Antalgic (reciprocal pattern; decreased michael)  Comments/Distance (ft) 1: 150 feet x2  Transfers  Transfer: Yes  Transfer 1  Transfer From 1: Bed to, Stand to  Transfer to 1: Stand, Chair with arms, Toilet  Technique 1: Sit to stand, Stand to sit  Transfer Device 1: Walker  Transfer Level of Assistance 1: Distant supervision  Trials/Comments 1: x4; pt completed within precautions with BUE support on transfer surface    Stairs  Stairs: Yes  Stairs  Device 1: Single point cane  Assistance 1: Distant supervision, Minimal verbal cues  Comment/Number of Steps 1: three 6\" steps x2; pt required cues from PT for correct sequencing of cane and surgical limb pt demonstrated better understanding during second bout    Outcome Measures:  [facility] Basic Mobility  Turning from your back to your side while in a flat bed without using bedrails: None  Moving from lying on your back to sitting on the side of a flat bed without using bedrails: None  Moving to and from bed to chair (including a wheelchair): None  Standing up from a chair using your arms (e.g. wheelchair or bedside chair): None  To walk in hospital room: None  Climbing 3-5 steps with railing: None  Basic Mobility - Total Score: 24    Education Documentation  Handouts, taught by Jeri Leung PT at 3/30/2024  1:14 PM.  Learner: Patient  Readiness: Eager  Method: Explanation, Demonstration  Response: Verbalizes Understanding, Demonstrated Understanding    Precautions, taught by Jeri Leung PT at 3/30/2024  1:14 PM.  Learner: Patient  Readiness: Eager  Method: Explanation, Demonstration  Response: Verbalizes Understanding, Demonstrated Understanding    Body Mechanics, taught by Jeri Leung PT at 3/30/2024  1:14 PM.  Learner: Patient  Readiness: Eager  Method: Explanation, Demonstration  Response: Verbalizes Understanding, Demonstrated Understanding    Home Exercise Program, taught by Jeri Leung PT " at 3/30/2024  1:14 PM.  Learner: Patient  Readiness: Eager  Method: Explanation, Demonstration  Response: Verbalizes Understanding, Demonstrated Understanding    Mobility Training, taught by Jeri Leung PT at 3/30/2024  1:14 PM.  Learner: Patient  Readiness: Eager  Method: Explanation, Demonstration  Response: Verbalizes Understanding, Demonstrated Understanding    OP EDUCATION:  Outpatient Education  Individual(s) Educated: Patient  Education Provided: Anatomy, Body Mechanics, Fall Risk, Home Exercise Program, Home Safety, POC, Post-Op Precautions  Equipment: Compression Sleeve, Ice Pack  Risk and Benefits Discussed with Patient/Caregiver/Other: yes  Patient/Caregiver Demonstrated Understanding: yes  Plan of Care Discussed and Agreed Upon: yes  Patient Response to Education: Patient/Caregiver Verbalized Understanding of Information, Patient/Caregiver Performed Return Demonstration of Exercises/Activities, Patient/Caregiver Asked Appropriate Questions    Encounter Problems       Encounter Problems (Active)       Compromised Skin Integrity       LTG - Patient will be free from infection (Progressing)       Start:  03/29/24               Mobility       LTG - Patient will recall and demonstrate 3 out of 3 total hip precautions during mobility (Progressing)       Start:  03/29/24            LTG - Patient will ambulate community distance (Progressing)       Start:  03/29/24            LTG - Patient will navigate 3 steps with LRD + HHA (Met)       Start:  03/29/24    Resolved:  03/30/24            Pain             Encounter Problems (Resolved)       Balance       LTG - Patient will maintain standing and sitting balance to allow for completion of daily activities (Met)       Start:  03/29/24    Resolved:  03/30/24            PT Transfers       LTG - Patient will demonstrate safe transfer techniques (Met)       Start:  03/29/24    Resolved:  03/30/24            Safety       LTG - Patient will adhere to hip precautions  during ADL's and transfers (Met)       Start:  03/29/24    Resolved:  03/30/24                Jeri Leung PT, DPT

## 2024-03-30 NOTE — NURSING NOTE
Assumed care of pt from night shift, RN. Pt resting in recliner with call light in reach. Ice pack in place. Surgical dressing dry and intact. 2+ pulses and cap refill brisk. Lung sounds clear bilaterally. Pt denies any pain or needs at this time.

## 2024-03-30 NOTE — NURSING NOTE
Pt. Vital signs obtained.  Pt. Up to the chair.  Chair alarm on.  Encouraged pt to order breakfast.  Call light within reach.

## 2024-03-30 NOTE — PROGRESS NOTES
"POST-OPERATIVE PROGRESS NOTE      ============================  IMPRESSION/PLAN:  ============================  74 y.o. female s/p right JUAN ALBERTO completed on 3/29/2024     PLAN:  -Weightbearing: Weight bearing instructions not necessary at this time  -DVT Prophylaxis: ASA 81mg BID x 4 weeks  -Radiology Studies: post op xrays reviewed  -Continue PT/OT Eval  -Disposition: home today with C  -Discharge Instructions in EMR  -Follow-up: three weeks        Lauren ROSALES Diamond seen and examined at bedside. Doing well, no issues overnight. Pain controlled with current treatment plan.     Review of Systems:   Constitutional: See HPI for pain assessment, No significant weight loss, recent trauma. Denies fevers/chills  Cardiovascular: No chest pain, shortness of breath  Respiratory: No difficulty breathing, cough  Gastrointestinal: No nausea, vomiting, diarrhea, constipation  Musculoskeletal: Noted in HPI, no arthralgias   Integumentary: No rashes, easy bruising, redness   Neurological: no numbness or tingling in extremities, no gait disturbances     Last Recorded Vitals  Blood pressure (!) 90/44, pulse 66, temperature 36.6 °C (97.9 °F), temperature source Temporal, resp. rate 16, height 1.575 m (5' 2.01\"), weight 59.6 kg (131 lb 6.3 oz), SpO2 98 %.      Patient Active Problem List   Diagnosis    Hip pain, right    Hyperlipidemia    Macrocytosis    Osteopenia    Asymmetrical right sensorineural hearing loss    Anemia    Abnormal mammogram    Sacral fracture (CMS/HCC)    Sciatica    Vitamin D deficiency    Iliotibial band syndrome of right side    Tendinopathy of right gluteus medius    Trochanteric bursitis, right hip    Arthralgia of hip    Impaired fasting blood sugar    Primary osteoarthritis of right hip       =================================  EXAM  =================================  GENERAL: A/Ox3, NAD. Appears healthy, well nourished  CARDIAC: regular rate  LUNGS: Breathing non-labored    MUSCULOSKELETAL:  Laterality: right hip "    - Incision: dressing in place with no saturation  - Palpation: appropriate post operative TTP along surgical incision  - Can actively straight leg raise  - compartments soft, negative homans    NEUROVASCULAR:  - Neurovascular Status: sensation intact to light touch distally  - Capillary refill brisk at extremities, Bilateral dorsalis pedis pulse 2+        Tyler Salazar DO  Attending Surgeon  Joint Replacement and Adult Reconstructive Surgery  [place], [place]

## 2024-03-30 NOTE — CARE PLAN
Pt POD#1 right total hip with Dr. Salazar.  Plan is home today with [facility] 3/31/24.

## 2024-03-30 NOTE — NURSING NOTE
Reviewed medications and discharge instructions with patient at the bedside. Pt verbalized understanding. IV discontinued without incidence. Tip intact and pressure applied. Pt wheeled down via wheelchair by AHMET Diaz and assisted into vehicle.

## 2024-03-30 NOTE — CARE PLAN
Problem: Mobility  Goal: LTG - Patient will recall and demonstrate 3 out of 3 total hip precautions during mobility  Outcome: Progressing  Goal: LTG - Patient will ambulate community distance  Outcome: Progressing     Problem: Pain  Goal: LTG - Patient will manage pain with the appropriate technique/Intervention  Outcome: Progressing     Problem: Compromised Skin Integrity  Goal: LTG - Patient will be free from infection  Outcome: Progressing     Problem: Balance  Goal: LTG - Patient will maintain standing and sitting balance to allow for completion of daily activities  Outcome: Met     Problem: Mobility  Goal: LTG - Patient will navigate 3 steps with LRD + HHA  Outcome: Met     Problem: Safety  Goal: LTG - Patient will adhere to hip precautions during ADL's and transfers  Outcome: Met     Problem: PT Transfers  Goal: LTG - Patient will demonstrate safe transfer techniques  Outcome: Met

## 2024-03-30 NOTE — CARE PLAN
The patient's goals for the shift include  pain control    The clinical goals for the shift include pain control

## 2024-03-31 ENCOUNTER — HOME CARE VISIT (OUTPATIENT)
Dept: HOME HEALTH SERVICES | Facility: HOME HEALTH | Age: 74
End: 2024-03-31
Payer: MEDICARE

## 2024-03-31 VITALS
SYSTOLIC BLOOD PRESSURE: 100 MMHG | TEMPERATURE: 97.1 F | DIASTOLIC BLOOD PRESSURE: 58 MMHG | RESPIRATION RATE: 16 BRPM | HEART RATE: 65 BPM

## 2024-03-31 PROCEDURE — G0151 HHCP-SERV OF PT,EA 15 MIN: HCPCS

## 2024-03-31 PROCEDURE — 0023 HH SOC

## 2024-03-31 SDOH — HEALTH STABILITY: PHYSICAL HEALTH
EXERCISE COMMENTS: SUPINE QUAD AND GLUT SETS, HEEL SLIDES, ANKLE PUMPS, SAQ X 10  SITTING LAQ X 10  STANDING HEEL RAISES, HIP ABD(SLIDES), HAMSTRING CURLS, HIP EXT WITH FOOT ON THE FLOOR(ISOMETRIC GUT SET), MARCHING

## 2024-03-31 SDOH — HEALTH STABILITY: PHYSICAL HEALTH: EXERCISE TYPE: SEE COMMENTS, THR POSTERIOR

## 2024-03-31 ASSESSMENT — ENCOUNTER SYMPTOMS
OCCASIONAL FEELINGS OF UNSTEADINESS: 0
PAIN LOCATION - PAIN DURATION: VARIES
PAIN LOCATION - PAIN FREQUENCY: INTERMITTENT
LOWEST PAIN SEVERITY IN PAST 24 HOURS: 4/10
DEPRESSION: 0
PAIN LOCATION - PAIN SEVERITY: 4/10
PAIN: 1
HIGHEST PAIN SEVERITY IN PAST 24 HOURS: 5/10
PAIN LOCATION - RELIEVING FACTORS: REST, ICE, MEDS
PAIN LOCATION - PAIN QUALITY: ACHES
MUSCLE WEAKNESS: 1
SUBJECTIVE PAIN PROGRESSION: GRADUALLY IMPROVING
LOSS OF SENSATION IN FEET: 0
LIMITED RANGE OF MOTION: 1
PAIN LOCATION: RIGHT HIP
PAIN SEVERITY GOAL: 0/10
PERSON REPORTING PAIN: PATIENT
PAIN LOCATION - EXACERBATING FACTORS: MVT

## 2024-03-31 ASSESSMENT — ACTIVITIES OF DAILY LIVING (ADL)
AMBULATION ASSISTANCE: STAND BY ASSIST
ENTERING_EXITING_HOME: NEEDS ASSISTANCE
AMBULATION ASSISTANCE: 1
OASIS_M1830: 03
AMBULATION_DISTANCE/DURATION_TOLERATED: 100
AMBULATION ASSISTANCE ON FLAT SURFACES: 1

## 2024-03-31 NOTE — CASE COMMUNICATION
Pt doing very well,  I scheduled 2 visits for Cipriano because dressing off Friday and may need to quick f.u visit before.  Pt declining OT.  She has had several back surgeries and rotator cuff.  She was showered and dressed when I arrived and has all the adaptive equipment.  Pt very active, bike rider, water yoga.

## 2024-04-01 ENCOUNTER — APPOINTMENT (OUTPATIENT)
Dept: HOME HEALTH SERVICES | Facility: HOME HEALTH | Age: 74
End: 2024-04-01
Payer: MEDICARE

## 2024-04-01 DIAGNOSIS — Z47.1 AFTERCARE FOLLOWING RIGHT HIP JOINT REPLACEMENT SURGERY: Primary | ICD-10-CM

## 2024-04-01 DIAGNOSIS — Z96.641 AFTERCARE FOLLOWING RIGHT HIP JOINT REPLACEMENT SURGERY: Primary | ICD-10-CM

## 2024-04-01 NOTE — SIGNIFICANT EVENT
Thank you for taking my call today regarding your recent joint replacement surgery with Dr. Tyler Salazar.      We discussed that: Home Health Care services (physical and/or occupational therapy) have been initiated Your pain is Controlled on the current regimen Will fluctuate throughout recovery with increased activity You are able to tolerate regular activity and exercises The importance of continued cold therapy throughout recovery The importance of following the prescribed precautions by your surgeon You have had a bowel movement The importance of continuing blood thinner as prescribed The importance of wearing compression stockings as prescribed    You indicated that all of your questions have been answered at the time of our call.    Please don't hesitate to reach out if you have any additional questions or concerns.    Naomie Philippe MBA, BSN, RN-BC  RUTHANN CallahanN, RN  [facility] Program Nav[facility]ators  [facility] [phone]

## 2024-04-01 NOTE — PROGRESS NOTES
Per our conversation regarding needing an outpatient physical therapy order, I have placed the order so you can call and schedule your first session for approximately 3 weeks after the surgical date.     Thank you,    Ambreen Ortega MBA, BSN, RN-BC  Orthopedic   [facility]  [phone]

## 2024-04-02 ENCOUNTER — HOME CARE VISIT (OUTPATIENT)
Dept: HOME HEALTH SERVICES | Facility: HOME HEALTH | Age: 74
End: 2024-04-02
Payer: MEDICARE

## 2024-04-02 DIAGNOSIS — M16.11 ARTHRITIS OF RIGHT HIP: ICD-10-CM

## 2024-04-02 PROCEDURE — G0151 HHCP-SERV OF PT,EA 15 MIN: HCPCS

## 2024-04-02 ASSESSMENT — ENCOUNTER SYMPTOMS
SUBJECTIVE PAIN PROGRESSION: GRADUALLY IMPROVING
PAIN LOCATION - PAIN QUALITY: DISCOMFORT
PAIN: 1
PERSON REPORTING PAIN: PATIENT
PAIN LOCATION: RIGHT HIP
PAIN LOCATION - PAIN QUALITY: ACHE
PAIN LOCATION: RIGHT KNEE

## 2024-04-02 ASSESSMENT — ACTIVITIES OF DAILY LIVING (ADL)
AMBULATION_DISTANCE/DURATION_TOLERATED: HOUSEHOLD
AMBULATION ASSISTANCE ON FLAT SURFACES: 1

## 2024-04-03 PROCEDURE — G0180 MD CERTIFICATION HHA PATIENT: HCPCS | Performed by: ORTHOPAEDIC SURGERY

## 2024-04-05 ENCOUNTER — HOME CARE VISIT (OUTPATIENT)
Dept: HOME HEALTH SERVICES | Facility: HOME HEALTH | Age: 74
End: 2024-04-05
Payer: MEDICARE

## 2024-04-05 PROCEDURE — G0151 HHCP-SERV OF PT,EA 15 MIN: HCPCS

## 2024-04-05 ASSESSMENT — ENCOUNTER SYMPTOMS
PAIN: 1
PERSON REPORTING PAIN: PATIENT
SUBJECTIVE PAIN PROGRESSION: GRADUALLY IMPROVING

## 2024-04-05 ASSESSMENT — ACTIVITIES OF DAILY LIVING (ADL)
AMBULATION ASSISTANCE ON FLAT SURFACES: 1
AMBULATION_DISTANCE/DURATION_TOLERATED: HOUSEHOLD

## 2024-04-09 ENCOUNTER — HOME CARE VISIT (OUTPATIENT)
Dept: HOME HEALTH SERVICES | Facility: HOME HEALTH | Age: 74
End: 2024-04-09
Payer: MEDICARE

## 2024-04-09 PROCEDURE — G0151 HHCP-SERV OF PT,EA 15 MIN: HCPCS

## 2024-04-09 ASSESSMENT — ENCOUNTER SYMPTOMS
PAIN: 1
PERSON REPORTING PAIN: PATIENT
PAIN LOCATION: RIGHT LEG

## 2024-04-09 ASSESSMENT — ACTIVITIES OF DAILY LIVING (ADL)
OASIS_M1830: 00
HOME_HEALTH_OASIS: 00

## 2024-04-09 NOTE — CASE COMMUNICATION
Dr. Salazar,    Your surgical talent continues to amaze me.  I had to discharge Lauren today on POD11 because I ran out of ways to challenge her.  She is walking independently with no device and doing stairs reciprically with no device.  Her only continued pain is in her distal lateral thigh which she has been trying to address for years.  She told me you warned her the surgery was unlikely to fix it.  I'll leave it to the outpatient dieter smith (Niesha) and Dr. Mendiola to continue working toward a solution.  Hopefully being able to return to proper biomechanics with her new hip will make a difference as well.      Keep up the great work  -Cipriano

## 2024-04-15 ENCOUNTER — EVALUATION (OUTPATIENT)
Dept: PHYSICAL THERAPY | Facility: CLINIC | Age: 74
End: 2024-04-15
Payer: MEDICARE

## 2024-04-15 DIAGNOSIS — M16.11 PRIMARY OSTEOARTHRITIS OF RIGHT HIP: Primary | ICD-10-CM

## 2024-04-15 DIAGNOSIS — M16.11 ARTHRITIS OF RIGHT HIP: ICD-10-CM

## 2024-04-15 DIAGNOSIS — Z96.641 STATUS POST TOTAL REPLACEMENT OF RIGHT HIP: ICD-10-CM

## 2024-04-15 PROCEDURE — 97140 MANUAL THERAPY 1/> REGIONS: CPT | Mod: GP

## 2024-04-15 PROCEDURE — 97161 PT EVAL LOW COMPLEX 20 MIN: CPT | Mod: GP

## 2024-04-15 PROCEDURE — 97110 THERAPEUTIC EXERCISES: CPT | Mod: GP

## 2024-04-15 ASSESSMENT — ENCOUNTER SYMPTOMS
OCCASIONAL FEELINGS OF UNSTEADINESS: 0
LOSS OF SENSATION IN FEET: 0
DEPRESSION: 0

## 2024-04-15 ASSESSMENT — COLUMBIA-SUICIDE SEVERITY RATING SCALE - C-SSRS
6. HAVE YOU EVER DONE ANYTHING, STARTED TO DO ANYTHING, OR PREPARED TO DO ANYTHING TO END YOUR LIFE?: NO
2. HAVE YOU ACTUALLY HAD ANY THOUGHTS OF KILLING YOURSELF?: NO
1. IN THE PAST MONTH, HAVE YOU WISHED YOU WERE DEAD OR WISHED YOU COULD GO TO SLEEP AND NOT WAKE UP?: NO

## 2024-04-15 ASSESSMENT — PATIENT HEALTH QUESTIONNAIRE - PHQ9
SUM OF ALL RESPONSES TO PHQ9 QUESTIONS 1 AND 2: 0
2. FEELING DOWN, DEPRESSED OR HOPELESS: NOT AT ALL
1. LITTLE INTEREST OR PLEASURE IN DOING THINGS: NOT AT ALL

## 2024-04-15 NOTE — PROGRESS NOTES
Physical Therapy Evaluation and Treatment      Patient Name: Lauren Fields  MRN: [id number]  Today's Date: 4/15/2024     Time Calculation  Start Time: 1500  Stop Time: 1600  Time Calculation (min): 60 min  PT Therapeutic Procedures Time Entry  Manual Therapy Time Entry: 15  Therapeutic Exercise Time Entry: 30  Visit: 1/12  Referred by:  Tyler Salazar DO    Insurance:  [employer] MEDICARE   Certification Dates:  4-15-24 to 7-8-24  Current Problem: [M16.11]  1. Primary osteoarthritis of right hip        2. Arthritis of right hip  Referral to Physical Therapy      3. Status post total replacement of right hip                 Surgery:  3-29-24, posterolateral approach    Subjective:  Pt finished Home PT last Tuesday.  She tried the clams recently & this flared up her Rt hip.      Patient's Living Environment/PLOF:  Retired , lives alone, very active, bikes, hikes, swims, travels requiring lots of walking, yoga  Patient's Therapy Goals: Return to all of her active pursuits w/o limitation or pain     Pain:  Intensity: 6/10            Location: Posterior Rt Hip            Duration: Intermittent            Aggravating Factor: Prolonged WB, certain movements            Alleviating Factor:  Rest, ice, meds    HPI:  Chronic Rt hip pain, progressively deteriorating over time    Social Factors:  1-2 personal factors &/or comorbidities     Precautions: Per Protocol        Objective   Hip AROM: (degrees) Left Right   Flexion 122 100   Abduction 30 30   External Rotation 10 30   Internal Rotation 20 30   Flexibility:       Hamstrings -25 -34     Hip Strength: MMT Left Right   Flexion 5/5 -4/5   Extension 5/5 -4/5   Abduction 5/5 -4/5   External Rotation 5/5 -3/5   Internal Rotation 5/5 -3/5   Quadriceps 5/5 4/5   Hamstrings 5/5 4/5   *denotes pain with motion or muscle testing    Gait:  Pt independent w/o assistive device, slightly favoring Rt LE  Palpation:  + tenderness near surgical incision & adjacent  tissues    Transfers:  Independent, with use of hands  Outcome Measures:  LEFS:  25%    Treatments:    Exercises:    Lateral walking, 1.5 lbs  Hip abd w/ ball, 2 x 10  Isometric Hip abd w/ belt, 2 x 10 sec  4 inch step ups, 20 x  4 inch lateral step ups, 20 x  Total Gym Squats, 2 x 10  Hip Ext, 2 x 10, 1.5 lbs  Marching, 1.5 lbs, 25 feet x 2  SLS, Blue Foam, 3 x 30 sec  BKFO, 3 x 15 sec     Assessment:     Pt is a 73 yo female who presented to the clinic today s/p Rt THR on 3-29-24.  PMH:  cervical disectomy and fusion C3-C6 10/2014,cervical fusion 8/2015, lumbar laminectomy & fusion L4, L5 11/2021, fusion [id number] RTCR 3/2022, ulnar nerve transposition 8/2014,  Examination reveals the following deficits:  decreased Rt hip/LE strength, decreased Rt hip/LE AROM, gait abnormalities, decreased flexibility, positional intolerance and pain with movement, prediabetes. 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  These deficits lead to difficulties with ADLs as well as recreational activity.  Skilled PT will address these deficits to help reduce pain for return to PLOF.  She will benefit from skilled PT to address the above mentioned impairments . Low complexity due to patient's clinical presentation being stable and uncomplicated by any significant comorbidities that may affect rehab tolerance and progression.     Clinical presentation:  Evolving with changing characteristics,         Plan: 2 x week for 6 wks, to include HEP, self-management, ther ex for UE strengthening, activity progression, modalities for pain relief, posture re-ed, NMR       Rehab potential:  Good   Plan of care agreement: Patient understands & agrees with goals & plan documented    EDUCATION: HEP, POC, activity modifications/progression, pain management/modalities, and injury pathology       Goals:   Active       PT Problem       PT Goal 1       Start:  04/15/24    Expected End:  07/08/24       Pt independent  with HEP and consistent for optimal recovery and self-management after DC from PT.             PT Goal 2       Start:  04/15/24    Expected End:  07/08/24       Patient will increase Rt LE/hip strength to 5/5 to improve functional mobility for travel plans, community ambulation.          PT Goal 3       Start:  04/15/24    Expected End:  07/08/24       Pt will increase LEFS score to > or = 70% to demonstrate functional improvement.           PT Goal 4       Start:  04/15/24    Expected End:  07/08/24       Pt will improve Rt LE/Hip AROM to > Lt LE/Hip AROM all movements to improve functional mobiity, dressing, getting in/out small areas, tub, car.

## 2024-04-18 ENCOUNTER — TREATMENT (OUTPATIENT)
Dept: PHYSICAL THERAPY | Facility: CLINIC | Age: 74
End: 2024-04-18
Payer: MEDICARE

## 2024-04-18 ENCOUNTER — OFFICE VISIT (OUTPATIENT)
Dept: ORTHOPEDIC SURGERY | Facility: CLINIC | Age: 74
End: 2024-04-18
Payer: MEDICARE

## 2024-04-18 VITALS — WEIGHT: 131 LBS | HEIGHT: 62 IN | BODY MASS INDEX: 24.11 KG/M2

## 2024-04-18 DIAGNOSIS — Z96.641 STATUS POST TOTAL REPLACEMENT OF RIGHT HIP: ICD-10-CM

## 2024-04-18 DIAGNOSIS — M16.11 ARTHRITIS OF RIGHT HIP: Primary | ICD-10-CM

## 2024-04-18 DIAGNOSIS — M16.11 PRIMARY OSTEOARTHRITIS OF RIGHT HIP: ICD-10-CM

## 2024-04-18 PROCEDURE — 1157F ADVNC CARE PLAN IN RCRD: CPT | Performed by: ORTHOPAEDIC SURGERY

## 2024-04-18 PROCEDURE — 1159F MED LIST DOCD IN RCRD: CPT | Performed by: ORTHOPAEDIC SURGERY

## 2024-04-18 PROCEDURE — 97110 THERAPEUTIC EXERCISES: CPT | Mod: GP

## 2024-04-18 PROCEDURE — 99024 POSTOP FOLLOW-UP VISIT: CPT | Performed by: ORTHOPAEDIC SURGERY

## 2024-04-18 PROCEDURE — 1125F AMNT PAIN NOTED PAIN PRSNT: CPT | Performed by: ORTHOPAEDIC SURGERY

## 2024-04-18 PROCEDURE — 1160F RVW MEDS BY RX/DR IN RCRD: CPT | Performed by: ORTHOPAEDIC SURGERY

## 2024-04-18 PROCEDURE — 97112 NEUROMUSCULAR REEDUCATION: CPT | Mod: GP

## 2024-04-18 ASSESSMENT — PAIN - FUNCTIONAL ASSESSMENT: PAIN_FUNCTIONAL_ASSESSMENT: 0-10

## 2024-04-18 ASSESSMENT — PAIN SCALES - GENERAL: PAINLEVEL_OUTOF10: 6

## 2024-04-18 NOTE — PROGRESS NOTES
ORTHOPEDIC TOTAL JOINT  POST-OPERATIVE FOLLOW UP      ============================  IMPRESSION/PLAN:  ============================  74 y.o. female s/p Right Total Hip Replacement completed on 03/29/2024.    PLAN:  -Weightbearing: Weight bearing as tolerated  -DVT Prophylaxis: Aspirin 81 mg twice daily for 1 more week  -Radiology Studies: None today  -Therapies: Continue PT/OT  -Transition off assistive device as seen fit by patient and therapy  -Follow-up: 7 weeks with x-rays        Lauren Fields presents today for follow up of joint replacement above. Pain controlled with current treatment plan. Patient has begun physical therapy. They are currently doing therapy at [facility]. They are currently ambulating with  no assistance .   She is doing great overall, but since her surgery her sciatica has flared up and she has noticed increased pain in her right knee.      Review of Systems:   Constitutional: See HPI for pain assessment, No significant weight loss, recent trauma. Denies fevers/chills  Cardiovascular: No chest pain, shortness of breath  Respiratory: No difficulty breathing, cough  Gastrointestinal: No nausea, vomiting, diarrhea, constipation  Musculoskeletal: Noted in HPI, no arthralgias   Integumentary: No rashes, easy bruising, redness   Neurological: no numbness or tingling in extremities, no gait disturbances     Patient Active Problem List   Diagnosis    Hip pain, right    Hyperlipidemia    Macrocytosis    Osteopenia    Asymmetrical right sensorineural hearing loss    Anemia    Abnormal mammogram    Sacral fracture (Multi)    Sciatica    Vitamin D deficiency    Iliotibial band syndrome of right side    Tendinopathy of right gluteus medius    Trochanteric bursitis, right hip    Arthralgia of hip    Impaired fasting blood sugar    Primary osteoarthritis of right hip    Status post total replacement of right hip       =================================  EXAM  =================================  GENERAL:  A/Ox3, NAD. Appears healthy, well nourished  CARDIAC: regular rate  LUNGS: Breathing non-labored    MUSCULOSKELETAL:  Laterality: right Hip exam  - Strength: Abduction 5/5, Flexion 5/5  - Palpation: non TTP along surgical site  - Incision: No overlying, erythema, induration, or drainage. Incision healing well with no wound dehiscence  - EHL/PF/DF motor intact  - compartments soft, negative homans  - Gait: using no assistive device    NEUROVASCULAR:  - Neurovascular Status: sensation intact to light touch distally  - Capillary refill brisk at extremities, Bilateral dorsalis pedis pulse 2+      IMAGING: None today      Tyler Salazar DO  Attending Surgeon  Joint Replacement and Adult Reconstructive Surgery  [place], [place]

## 2024-04-18 NOTE — PROGRESS NOTES
Physical Therapy Treatment Note     Patient Name: Lauren Fields  MRN: [id number]  Today's Date: 4/18/2024     Time Calculation  Start Time: 1130  Stop Time: 1215  Time Calculation (min): 45 min  PT Therapeutic Procedures Time Entry  Neuromuscular Re-Education Time Entry: 15  Therapeutic Exercise Time Entry: 30  Visit: 2/12  Referred by:  Tyler Salazar DO    Insurance:  [employer]IC[employer]   Certification Dates:  4-15-24 to 7-8-24  Current Problem: [M16.11]  1. Primary osteoarthritis of right hip  Follow Up In Physical Therapy      2. Status post total replacement of right hip  Follow Up In Physical Therapy               Surgery:  3-29-24, posterolateral approach    Subjective:  Pt finished Home PT last Tuesday.  She tried the clams recently & this flared up her Rt hip.      Patient's Living Environment/PLOF:  Retired , lives alone, very active, bikes, hikes, swims, travels requiring lots of walking, yoga  Patient's Therapy Goals: Return to all of her active pursuits w/o limitation or pain     Pain:  Intensity: 5-6/10            Location: Posterior Rt Hip, Rt distal ITB            Duration: Intermittent            Aggravating Factor: Prolonged WB, certain movements            Alleviating Factor:  Rest, ice, meds    HPI:  Chronic Rt hip pain, progressively deteriorating over time    Social Factors:  1-2 personal factors &/or comorbidities     Precautions: Per Protocol        Objective     Gait:  Pt independent w/o assistive device, slightly favoring Rt LE  Palpation:  + tenderness near surgical incision & adjacent tissues    Transfers:  Independent, with use of hands  Outcome Measures:  LEFS:  25%    Treatments:      Exercises:      Lateral walking, 1.5 lbs  Hip abd w/ ball, 2 x 10  Isometric Hip abd w/ belt, 2 x 10 sec  4 inch step ups, 20 x  4 inch lateral step ups, 20 x  Total Gym Squats, 2 x 10  Hip Ext, 2 x 10, 1.5 lbs  Marching, 1.5 lbs, 25 feet x 2  SLS, Blue Foam, 3 x 30 sec  BKFO, 3 x 15  sec  LAQ's, 2 x 15, 3 lbs  Hamstring Curls, green, 2 x 15     Assessment:     Pt stated she walked the other day over 1,000 steps, twice more than her previous steps.  She is still struggling with Rt distal ITB issues.  Pt stated some soreness after her 1st visit, but mild.  She is motivated to get in shape for upcoming hiking.    Plan: 2 x week for 6 wks, to include HEP, self-management, ther ex for UE strengthening, activity progression, modalities for pain relief, posture re-ed, NMR

## 2024-04-19 DIAGNOSIS — Z78.0 MENOPAUSE: Primary | ICD-10-CM

## 2024-04-19 NOTE — TELEPHONE ENCOUNTER
Patient has upcoming annual on 7/30/24, she is requesting refill of Premarin 0.625 MG/GM to be [facility] [place]

## 2024-04-22 ENCOUNTER — TREATMENT (OUTPATIENT)
Dept: PHYSICAL THERAPY | Facility: CLINIC | Age: 74
End: 2024-04-22
Payer: MEDICARE

## 2024-04-22 DIAGNOSIS — Z96.641 STATUS POST TOTAL REPLACEMENT OF RIGHT HIP: ICD-10-CM

## 2024-04-22 DIAGNOSIS — M16.11 PRIMARY OSTEOARTHRITIS OF RIGHT HIP: Primary | ICD-10-CM

## 2024-04-22 PROCEDURE — 97110 THERAPEUTIC EXERCISES: CPT | Mod: GP

## 2024-04-22 PROCEDURE — 97140 MANUAL THERAPY 1/> REGIONS: CPT | Mod: GP

## 2024-04-22 NOTE — PROGRESS NOTES
Physical Therapy Treatment Note     Patient Name: Lauren Fields  MRN: [id number]  Today's Date: 4/22/2024     Time Calculation  Start Time: 1000  Stop Time: 1045  Time Calculation (min): 45 min  PT Therapeutic Procedures Time Entry  Manual Therapy Time Entry: 25  Therapeutic Exercise Time Entry: 20    Visit: 3/12  Referred by:  Tyler Salazar DO    Insurance:  [employer]ICARE   Certification Dates:  4-15-24 to 7-8-24  Current Problem: [M16.11]  1. Primary osteoarthritis of right hip        2. Status post total replacement of right hip                 Surgery:  3-29-24, posterolateral approach    Subjective:  Pt finished Home PT last Tuesday.  She tried the clams recently & this flared up her Rt hip.      Patient's Living Environment/PLOF:  Retired , lives alone, very active, bikes, hikes, swims, travels requiring lots of walking, yoga  Patient's Therapy Goals: Return to all of her active pursuits w/o limitation or pain     Pain:  Intensity: 5-6/10            Location: Posterior Rt Hip, Rt distal ITB            Duration: Intermittent            Aggravating Factor: Prolonged WB, certain movements            Alleviating Factor:  Rest, ice, meds    HPI:  Chronic Rt hip pain, progressively deteriorating over time    Social Factors:  1-2 personal factors &/or comorbidities     Precautions: Per Protocol        Objective     Palpation:  + tenderness near surgical incision & adjacent tissues    Treatments:      Exercises:      Hip abd w/ ball, 2 x 10  Isometric Hip abd w/ belt, 2 x 10 sec  6 inch step ups, 20 x  4 inch lateral step ups, 20 x  Total Gym Squats, 2 x 10  SLS, Blue Foam, 3 x 30 sec  BKFO, 3 x 15 sec    Manual:  STM through Rt ITB, proximal to distal for pain relief    Assessment:     Pt stated she was alternating between heat and cold over the weekend. She took it easy over the weekend, kept WB to a minimum.  Manual performed as noted.    She is still struggling with Rt distal ITB issues.  Her Rt  hip feels good, she feels that if she didn't have the Rt ITB pain, she could start hiking again.      Plan: 2 x week for 6 wks, to include HEP, self-management, ther ex for UE strengthening, activity progression, modalities for pain relief, posture re-ed, NMR

## 2024-04-25 ENCOUNTER — TREATMENT (OUTPATIENT)
Dept: PHYSICAL THERAPY | Facility: CLINIC | Age: 74
End: 2024-04-25
Payer: MEDICARE

## 2024-04-25 DIAGNOSIS — M16.11 PRIMARY OSTEOARTHRITIS OF RIGHT HIP: ICD-10-CM

## 2024-04-25 DIAGNOSIS — Z96.641 STATUS POST TOTAL REPLACEMENT OF RIGHT HIP: ICD-10-CM

## 2024-04-25 PROCEDURE — 97140 MANUAL THERAPY 1/> REGIONS: CPT | Mod: GP

## 2024-04-25 PROCEDURE — 97112 NEUROMUSCULAR REEDUCATION: CPT | Mod: GP

## 2024-04-25 PROCEDURE — 97110 THERAPEUTIC EXERCISES: CPT | Mod: GP

## 2024-04-25 NOTE — PROGRESS NOTES
Physical Therapy Treatment Note     Patient Name: Lauren Fields  MRN: [id number]  Today's Date: 4/25/2024     Time Calculation  Start Time: 1130  Stop Time: 1230  Time Calculation (min): 60 min  PT Therapeutic Procedures Time Entry  Manual Therapy Time Entry: 25  Neuromuscular Re-Education Time Entry: 15  Therapeutic Exercise Time Entry: 15  Visit: 4/12  Referred by:  Tylre Salazar DO    Insurance:  [employer]   Certification Dates:  4-15-24 to 7-8-24  Current Problem: [M16.11]  1. Primary osteoarthritis of right hip  Follow Up In Physical Therapy      2. Status post total replacement of right hip  Follow Up In Physical Therapy               Surgery:  3-29-24, posterolateral approach    Subjective:  Pt stated her Rt ITB is doing better.  The proximal portion seems to have calmed down.  The distal portion is better as well but more irritated than the proximal segment.  She was able to hike 1.5 miles yesterday.      Patient's Living Environment/PLOF:  Retired , lives alone, very active, bikes, hikes, swims, travels requiring lots of walking, yoga  Patient's Therapy Goals: Return to all of her active pursuits w/o limitation or pain.  She has a trip this summer     Pain:  Intensity: 2-3/10            Location: Rt distal ITB            Duration: Intermittent            Aggravating Factor: Prolonged WB, activity dependant            Alleviating Factor:  Rest, ice, meds    HPI:  Chronic Rt hip pain, progressively deteriorating over time    Social Factors:  1-2 personal factors &/or comorbidities     Precautions: Per Protocol        Objective     Palpation:  + tenderness near surgical incision & adjacent tissues    Treatments:      Exercises:      Hip abd w/ ball, 2 x 10  3 way, supine clams, green 2 x 10 sec  Hip Ext, 2 x 10, 1.5 lbs  Hip Abd, 2 x 10, 1.5 lbs  Marching, 1.5 lbs, 25 feet x 2  Lateral Amb, 1.5 lbs, 25 feet x 2  BKFO, 3 x 15 sec  Piriformis Stretch, 3 x 15 sec  SLS, Blue Foam, 3 x 30  sec    Manual:  STM and cross friction through Rt ITB, distal half for pain relief.  Applied K-tape to same areas.  1 strip from lower 1/3 of ITB crossing the knee, 2nd strip from lateral to medial, 50% tension over painful areas, no tension on the tails.     Assessment:     Pt stated she is hopeful, her Rt ITB is starting to resolve.   She is still being cautious as she does not want to flare it up again.  Manual performed as noted.   Her Rt hip feels good, it is not functionally limiting.  She will look into restarting her [employer] membership today as she's feeling better.     Plan: Continue per POC, to include HEP, self-management, ther ex for UE strengthening, activity progression, modalities for pain relief, posture re-ed, NMR

## 2024-04-29 ENCOUNTER — TREATMENT (OUTPATIENT)
Dept: PHYSICAL THERAPY | Facility: CLINIC | Age: 74
End: 2024-04-29
Payer: MEDICARE

## 2024-04-29 DIAGNOSIS — Z96.641 STATUS POST TOTAL REPLACEMENT OF RIGHT HIP: ICD-10-CM

## 2024-04-29 DIAGNOSIS — M16.11 PRIMARY OSTEOARTHRITIS OF RIGHT HIP: Primary | ICD-10-CM

## 2024-04-29 PROCEDURE — 97110 THERAPEUTIC EXERCISES: CPT | Mod: GP

## 2024-04-29 PROCEDURE — 97112 NEUROMUSCULAR REEDUCATION: CPT | Mod: GP

## 2024-04-29 PROCEDURE — 97140 MANUAL THERAPY 1/> REGIONS: CPT | Mod: GP

## 2024-04-29 PROCEDURE — 97530 THERAPEUTIC ACTIVITIES: CPT | Mod: GP

## 2024-04-29 NOTE — PROGRESS NOTES
Physical Therapy Treatment Note     Patient Name: Lauren Fields  MRN: [id number]  Today's Date: 4/29/2024     Time Calculation  Start Time: 1000  Stop Time: 1100  Time Calculation (min): 60 min  PT Therapeutic Procedures Time Entry  Manual Therapy Time Entry: 15  Neuromuscular Re-Education Time Entry: 10  Therapeutic Exercise Time Entry: 20  Therapeutic Activity Time Entry: 15  Visit: 5/12  Referred by:  Tyler Salazar DO    Insurance:  [employer]   Certification Dates:  4-15-24 to 7-8-24  Current Problem: [M16.11]  1. Primary osteoarthritis of right hip        2. Status post total replacement of right hip                 Surgery:  3-29-24, posterolateral approach    Subjective:  Pt stated her Rt ITB is doing better but not resolved yet.  She reports some soreness in the Rt proximal hip today near the surgical incision.  She started back to water classes.    Patient's Living Environment/PLOF:  Retired , lives alone, very active, bikes, hikes, swims, travels requiring lots of walking, yoga  Patient's Therapy Goals: Return to all of her active pursuits w/o limitation or pain.  She has a trip this summer     Pain:  Intensity: 2-3/10            Location: Rt distal ITB            Duration: Intermittent            Aggravating Factor: Prolonged WB, activity dependant            Alleviating Factor:  Rest, ice, meds    HPI:  Chronic Rt hip pain, progressively deteriorating over time    Social Factors:  1-2 personal factors &/or comorbidities     Precautions: Per Protocol        Objective     Palpation:  + tenderness near surgical incision & Rt ITB, entire length    Treatments:      Exercises:      Step Ups, 6 inchs, 2 x 10  Lateral Step Ups, 6 inchs, 2 x 10  Hip Hikes, 2 x 10  3 way, supine clams, green 2 x 10 sec  Hip Ext, 2 x 10, 2 lbs  Hip Abd, 2 x 10, 2 lbs  Marching, 2 lbs, 25 feet x 2  Lateral Amb, 2 lbs, 25 feet x 2  BKFO, 3 x 15 sec  Piriformis Stretch, 3 x 15 sec  SLS, Blue Foam, 3 x 30  sec    Manual:  STM and cross friction through Rt ITB, entire length for pain relief.       Assessment:     Pt started back to the gym, attending some of the classes.  She reports no issues with her right ITB afterwards.  The surgeon cleared her of post of precautions, but she reports she is being cautious.  Manual performed as noted.   Her Rt hip feels good, it is not functionally limiting.  She will slowly return to outside cycling as strength improves.     Plan: Continue per POC, to include HEP, self-management, ther ex for UE strengthening, activity progression, modalities for pain relief, posture re-ed, NMR

## 2024-05-02 ENCOUNTER — APPOINTMENT (OUTPATIENT)
Dept: PHYSICAL THERAPY | Facility: CLINIC | Age: 74
End: 2024-05-02
Payer: MEDICARE

## 2024-05-06 ENCOUNTER — TREATMENT (OUTPATIENT)
Dept: PHYSICAL THERAPY | Facility: CLINIC | Age: 74
End: 2024-05-06
Payer: MEDICARE

## 2024-05-06 DIAGNOSIS — M16.11 PRIMARY OSTEOARTHRITIS OF RIGHT HIP: ICD-10-CM

## 2024-05-06 DIAGNOSIS — Z96.641 STATUS POST TOTAL REPLACEMENT OF RIGHT HIP: ICD-10-CM

## 2024-05-06 PROCEDURE — 97530 THERAPEUTIC ACTIVITIES: CPT | Mod: GP

## 2024-05-06 PROCEDURE — 97140 MANUAL THERAPY 1/> REGIONS: CPT | Mod: GP

## 2024-05-06 PROCEDURE — 97110 THERAPEUTIC EXERCISES: CPT | Mod: GP

## 2024-05-06 PROCEDURE — 97112 NEUROMUSCULAR REEDUCATION: CPT | Mod: GP

## 2024-05-06 NOTE — PROGRESS NOTES
Physical Therapy Treatment Note     Patient Name: Lauren Fields  MRN: [id number]  Today's Date: 5/6/2024     Time Calculation  Start Time: 1045  Stop Time: 1150  Time Calculation (min): 65 min  PT Therapeutic Procedures Time Entry  Manual Therapy Time Entry: 20  Neuromuscular Re-Education Time Entry: 10  Therapeutic Exercise Time Entry: 22  Therapeutic Activity Time Entry: 15  Visit: 6/12  Referred by:  Tyler Salazar DO    Insurance:  [facility] MEDICARE   Certification Dates:  4-15-24 to 7-8-24    Current Problem: [M16.11]  1. Primary osteoarthritis of right hip  Follow Up In Physical Therapy      2. Status post total replacement of right hip  Follow Up In Physical Therapy               Surgery:  3-29-24, posterolateral approach    Subjective:  Pt stated she is now able to do short hikes.  Her distal Rt ITB is doing better, now the proximal portion is acting up.  but not resolved yet.  She is back to water & chair yoga classes.      Patient's Living Environment/PLOF:  Retired , lives alone, very active, bikes, hikes, swims, travels requiring lots of walking, yoga  Patient's Therapy Goals: Return to all of her active pursuits w/o limitation or pain.  She has a trip this summer     Pain:  Intensity: 1-2/10            Location: Rt distal ITB            Duration: Intermittent            Aggravating Factor: Prolonged WB, activity dependant            Alleviating Factor:  Rest, ice, meds    HPI:  Chronic Rt hip pain, progressively deteriorating over time    Social Factors:  1-2 personal factors &/or comorbidities     Precautions: Per Protocol        Objective     Palpation:  + tenderness near surgical incision & to mid Rt ITB    Treatments:      Exercises:      Step Ups, 6 inchs, 2 x 10  Lateral Step Ups, 6 inchs, 2 x 10  Rotational Step Ups, 6 inchs, 2 x 10  Hip Hikes, 2 x 10  Hip Ext, 2 x 10, 20 lbs  Hip Abd, 2 x 10, 20 lbs  Hip Add, to midline, 2 x 10, 20 lbs  Marching, 2 lbs, 25 feet x 2  Lateral Amb, 2  lbs, 25 feet x 2  BKFO, 3 x 15 sec  Piriformis Stretch, 3 x 15 sec  Elliptical, 5 min, Level 1  TM, 3-6% incline, 5 min, 1.4 speed    Manual:  STM and cross friction through Rt ITB, entire length for pain relief, long axis distraction with circumduction, lateral/inferior glides, and lateral glides with ER    Assessment:     Pt is doing well per post op course.  She reports muscle endurance is not back to baseline yet. She gets winded easily.   Manual performed as noted to decrease tightness, improve mobility.   Her Rt hip feels good, Lt LE is starting to bother her, she has an appt to evaluate this.      Plan: Continue per POC, to include HEP, self-management, ther ex for UE strengthening, activity progression, modalities for pain relief, posture re-ed, NMR

## 2024-05-07 ENCOUNTER — APPOINTMENT (OUTPATIENT)
Dept: ORTHOPEDIC SURGERY | Facility: HOSPITAL | Age: 74
End: 2024-05-07
Payer: MEDICARE

## 2024-05-08 ENCOUNTER — OFFICE VISIT (OUTPATIENT)
Dept: ORTHOPEDIC SURGERY | Facility: HOSPITAL | Age: 74
End: 2024-05-08
Payer: MEDICARE

## 2024-05-08 DIAGNOSIS — M76.31 ILIOTIBIAL BAND SYNDROME OF BOTH SIDES: Primary | ICD-10-CM

## 2024-05-08 DIAGNOSIS — M76.32 ILIOTIBIAL BAND SYNDROME OF BOTH SIDES: Primary | ICD-10-CM

## 2024-05-08 DIAGNOSIS — M67.952 TENDINOPATHY OF LEFT GLUTEUS MEDIUS: ICD-10-CM

## 2024-05-08 PROCEDURE — 1160F RVW MEDS BY RX/DR IN RCRD: CPT | Performed by: STUDENT IN AN ORGANIZED HEALTH CARE EDUCATION/TRAINING PROGRAM

## 2024-05-08 PROCEDURE — 1159F MED LIST DOCD IN RCRD: CPT | Performed by: STUDENT IN AN ORGANIZED HEALTH CARE EDUCATION/TRAINING PROGRAM

## 2024-05-08 PROCEDURE — 1157F ADVNC CARE PLAN IN RCRD: CPT | Performed by: STUDENT IN AN ORGANIZED HEALTH CARE EDUCATION/TRAINING PROGRAM

## 2024-05-08 PROCEDURE — 99213 OFFICE O/P EST LOW 20 MIN: CPT | Performed by: STUDENT IN AN ORGANIZED HEALTH CARE EDUCATION/TRAINING PROGRAM
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
## 2024-05-15 ENCOUNTER — TREATMENT (OUTPATIENT)
Dept: PHYSICAL THERAPY | Facility: CLINIC | Age: 74
End: 2024-05-15
Payer: MEDICARE

## 2024-05-15 DIAGNOSIS — M16.11 PRIMARY OSTEOARTHRITIS OF RIGHT HIP: Primary | ICD-10-CM

## 2024-05-15 DIAGNOSIS — Z96.641 STATUS POST TOTAL REPLACEMENT OF RIGHT HIP: ICD-10-CM

## 2024-05-15 PROCEDURE — 97140 MANUAL THERAPY 1/> REGIONS: CPT | Mod: GP

## 2024-05-15 PROCEDURE — 97110 THERAPEUTIC EXERCISES: CPT | Mod: GP

## 2024-05-15 NOTE — PROGRESS NOTES
Physical Therapy Treatment Note     Patient Name: Lauren Fields  MRN: [id number]  Today's Date: 5/15/2024     Time Calculation  Start Time: 0745  Stop Time: 0830  Time Calculation (min): 45 min  PT Therapeutic Procedures Time Entry  Manual Therapy Time Entry: 15  Therapeutic Exercise Time Entry: 30    Visit: 7/12  Referred by:  Tyler Salazar DO    Insurance:  [employer]   Certification Dates:  4-15-24 to 7-8-24    Current Problem: [M16.11]  1. Primary osteoarthritis of right hip  Follow Up In Physical Therapy      2. Status post total replacement of right hip  Follow Up In Physical Therapy               Surgery:  3-29-24, posterolateral approach    Subjective:  Pt stated her Lt hip has been hurting her chronically.  She has some pain in the lateral hip as well.     Patient's Living Environment/PLOF:  Retired , lives alone, very active, bikes, hikes, swims, travels requiring lots of walking, yoga  Patient's Therapy Goals: Return to all of her active pursuits w/o limitation or pain.  She has a trip this summer     Pain:  Intensity: 1-2/10            Location: Rt distal ITB            Duration: Intermittent            Aggravating Factor: Prolonged WB, activity dependant            Alleviating Factor:  Rest, ice, meds    HPI:  Chronic Rt hip pain, progressively deteriorating over time    Social Factors:  1-2 personal factors &/or comorbidities     Precautions: Per Protocol        Objective     Palpation:  + tenderness near surgical incision & to mid Rt ITB    Treatments:      Exercises:      Hip Ext, 2 x 10, 20 lbs  Hip Abd, 2 x 10, 20 lbs  Hip Add, to midline, 2 x 10, 20 lbs  Piriformis Stretch, 3 x 15 sec    Manual:  Sacral mobs, PA mobs L2-L5, long axis distraction with circumduction Lt, lateral/inferior glides, and lateral glides with ER/Abd    Assessment:     Pt's Rt hip is doing well. Discussed DN her cluneals next visit to decrease radicular pain.  Manual performed as noted to decrease  tightness, improve mobility.   Tolerated all activities w/o increased pain.     Plan: Continue per POC, to include HEP, self-management, ther ex for LE strengthening, activity progression, modalities for pain relief, posture re-ed, NMR

## 2024-05-20 ENCOUNTER — TREATMENT (OUTPATIENT)
Dept: PHYSICAL THERAPY | Facility: CLINIC | Age: 74
End: 2024-05-20
Payer: MEDICARE

## 2024-05-20 DIAGNOSIS — Z96.641 STATUS POST TOTAL REPLACEMENT OF RIGHT HIP: ICD-10-CM

## 2024-05-20 DIAGNOSIS — M16.11 PRIMARY OSTEOARTHRITIS OF RIGHT HIP: ICD-10-CM

## 2024-05-20 PROCEDURE — 97110 THERAPEUTIC EXERCISES: CPT | Mod: GP

## 2024-05-20 PROCEDURE — 97112 NEUROMUSCULAR REEDUCATION: CPT | Mod: GP

## 2024-05-20 NOTE — PROGRESS NOTES
Physical Therapy Treatment Note     Patient Name: Lauren Fields  MRN: [id number]  Today's Date: 5/20/2024     Time Calculation  Start Time: 1000  Stop Time: 1055  Time Calculation (min): 55 min  PT Therapeutic Procedures Time Entry  Neuromuscular Re-Education Time Entry: 15  Therapeutic Exercise Time Entry: 38    Visit: 8/12  Referred by:  Tyler Salazar DO    Insurance:  [employer]   Certification Dates:  4-15-24 to 7-8-24    Current Problem: [M16.11]  1. Primary osteoarthritis of right hip  Follow Up In Physical Therapy      2. Status post total replacement of right hip  Follow Up In Physical Therapy               Surgery:  3-29-24, posterolateral approach    Subjective:  Pt stated her Rt hip is feeling better than her Lt hip now Lt hip has been hurting her chronically.  She is having the surgery on the Lt wrist this Thursday. (Injecting fat cells from      Patient's Living Environment/PLOF:  Retired , lives alone, very active, bikes, hikes, swims, travels requiring lots of walking, yoga  Patient's Therapy Goals: Return to all of her active pursuits w/o limitation or pain.  She has a trip this summer     Pain:  Intensity: 1-2/10            Location: Lt distal ITB            Duration: Intermittent            Aggravating Factor: Prolonged WB, activity dependant            Alleviating Factor:  Rest, ice, meds    HPI:  Chronic Rt hip pain, progressively deteriorating over time    Social Factors:  1-2 personal factors &/or comorbidities     Precautions: Per Protocol        Objective     Palpation:  + tenderness Lt glute med, Lt ITB, Lt SIJ    Treatments:      Exercises:      Hip Ext, 2 x 10, 40 lbs, (B)  Hip Abd, 2 x 10, 20 lbs  Hip Add, to midline, 2 x 10, 30 lbs  Piriformis Stretch, 3 x 15 sec    NMR:  DN to Left Hip, 1 x 50 mm, just lateral to the piriformis.  1  x 50 mm quadratus lumborum, 2 x 50 mm to Lt glute med, 3 x 50 mm Lt ITB, more distal, all needles bidirectionally manipulated and  tented, concurrent with e-stim, 10 Hz, 250 micro, over 15 min, pt in Rt sidelying    Assessment:     Trialed DN today as noted above.  Pt educated on post treatment care.  She tolerated all activities well. Left hip weakness noted today per patient while exercising both hips.  Her surgical hip feels more stable than her non-surgical hip.      Plan: Monitor response to DN next visit.  Continue per POC, to include HEP, self-management, ther ex for LE strengthening, activity progression, modalities for pain relief, posture re-ed, NMR

## 2024-05-28 ENCOUNTER — TREATMENT (OUTPATIENT)
Dept: PHYSICAL THERAPY | Facility: CLINIC | Age: 74
End: 2024-05-28
Payer: MEDICARE

## 2024-05-28 DIAGNOSIS — M16.11 PRIMARY OSTEOARTHRITIS OF RIGHT HIP: Primary | ICD-10-CM

## 2024-05-28 DIAGNOSIS — Z96.641 STATUS POST TOTAL REPLACEMENT OF RIGHT HIP: ICD-10-CM

## 2024-05-28 PROCEDURE — 97110 THERAPEUTIC EXERCISES: CPT | Mod: GP

## 2024-05-28 PROCEDURE — 97112 NEUROMUSCULAR REEDUCATION: CPT | Mod: GP

## 2024-05-28 PROCEDURE — 97140 MANUAL THERAPY 1/> REGIONS: CPT | Mod: GP

## 2024-05-28 NOTE — PROGRESS NOTES
Physical Therapy Treatment Note     Patient Name: Lauren Fields  MRN: [id number]  Today's Date: 5/28/2024     Time Calculation  Start Time: 1615  Stop Time: 1700  Time Calculation (min): 45 min  PT Therapeutic Procedures Time Entry  Manual Therapy Time Entry: 10  Neuromuscular Re-Education Time Entry: 15  Therapeutic Exercise Time Entry: 20  Visit: 10/12  Referred by:  Tyler Salazar DO    Insurance:  [employer]   Certification Dates:  4-15-24 to 7-8-24    Current Problem: [M16.11]  1. Primary osteoarthritis of right hip  Follow Up In Physical Therapy      2. Status post total replacement of right hip  Follow Up In Physical Therapy               Surgery:  3-29-24, posterolateral approach    Subjective:  Pt stated she had her Lt CMP surgery a week ago.  She presented today in post op bandages.       Patient's Living Environment/PLOF:  Retired , lives alone, very active, bikes, hikes, swims, travels requiring lots of walking, yoga  Patient's Therapy Goals: Return to all of her active pursuits w/o limitation or pain.  She has a trip this summer     Pain:  Intensity: 1-2/10            Location: Lt distal ITB            Duration: Intermittent            Aggravating Factor: Prolonged WB, activity dependant            Alleviating Factor:  Rest, ice, meds    HPI:  Chronic Rt hip pain, progressively deteriorating over time    Social Factors:  1-2 personal factors &/or comorbidities     Precautions: Per Protocol        Objective   Hip AROM: (degrees) Right   Flexion 124   Abduction 30   External Rotation 30   Internal Rotation 30   Flexibility:      Hamstrings -26     Hip Strength: MMT Right   Flexion -4/5   Extension -4/5   Abduction -4/5   External Rotation -3/5   Internal Rotation -3/5   Quadriceps 4/5   Hamstrings 4/5   *denotes pain with motion or muscle testing    Palpation:  + tenderness Lt glute med, Lt ITB, Lt SIJ    Treatments:      Exercises:      Hip Ext, 2 x 10, 40 lbs, (B)  Hip Abd, 2 x 10, 20  lbs  Hip Add, to midline, 2 x 10, 30 lbs  Piriformis Stretch, 3 x 15 sec    NMR:  DN to Left Hip, 1 x 50 mm, Cluneal 3 x 50 mm to Lt glute med, 4 x 50 mm Lt ITB, more distal, all needles bidirectionally manipulated and tented, concurrent with e-stim, 10 Hz, 250 micro, over 15 min, pt in Rt sidelying    Assessment:     Pt stated she got some relief from the DN last session she wanted to do it again today.,  Pt tolerated all activities well.  She is progressing well towards all goals but has not achieved functional or strength goal yet.  Recommend additional visits to achieve optimal recovery.      Plan:  Continue per POC until end of June, 1-2 x week , to include HEP, self-management, ther ex for LE strengthening, activity progression, modalities for pain relief, posture re-ed, NMR.       Goals:   Active       PT Problem       PT Goal 1 (Met)       Start:  04/15/24    Expected End:  07/08/24    Resolved:  05/28/24    Pt independent with HEP and consistent for optimal recovery and self-management after DC from PT.             PT Goal 2 (Progressing)       Start:  04/15/24    Expected End:  07/08/24       Patient will increase Rt LE/hip strength to 5/5 to improve functional mobility for travel plans, community ambulation.          PT Goal 3 (Progressing)       Start:  04/15/24    Expected End:  07/08/24       Pt will increase LEFS score to > or = 70% to demonstrate functional improvement.           PT Goal 4 (Met)       Start:  04/15/24    Expected End:  07/08/24    Resolved:  05/28/24    Pt will improve Rt LE/Hip AROM to > Lt LE/Hip AROM all movements to improve functional mobiity, dressing, getting in/out small areas, tub, car.

## 2024-06-03 ENCOUNTER — APPOINTMENT (OUTPATIENT)
Dept: PHYSICAL THERAPY | Facility: CLINIC | Age: 74
End: 2024-06-03
Payer: MEDICARE

## 2024-06-04 ENCOUNTER — OFFICE VISIT (OUTPATIENT)
Dept: ORTHOPEDIC SURGERY | Facility: CLINIC | Age: 74
End: 2024-06-04
Payer: MEDICARE

## 2024-06-04 ENCOUNTER — HOSPITAL ENCOUNTER (OUTPATIENT)
Dept: RADIOLOGY | Facility: CLINIC | Age: 74
Discharge: HOME | End: 2024-06-04
Payer: MEDICARE

## 2024-06-04 VITALS — BODY MASS INDEX: 24.11 KG/M2 | WEIGHT: 131 LBS | HEIGHT: 62 IN

## 2024-06-04 DIAGNOSIS — M16.11 ARTHRITIS OF RIGHT HIP: ICD-10-CM

## 2024-06-04 DIAGNOSIS — M25.351 INSTABILITY OF HIP JOINT, RIGHT: ICD-10-CM

## 2024-06-04 PROCEDURE — 73502 X-RAY EXAM HIP UNI 2-3 VIEWS: CPT | Mod: RIGHT SIDE | Performed by: RADIOLOGY

## 2024-06-04 PROCEDURE — 1125F AMNT PAIN NOTED PAIN PRSNT: CPT | Performed by: ORTHOPAEDIC SURGERY

## 2024-06-04 PROCEDURE — 99024 POSTOP FOLLOW-UP VISIT: CPT | Performed by: ORTHOPAEDIC SURGERY

## 2024-06-04 PROCEDURE — 1159F MED LIST DOCD IN RCRD: CPT | Performed by: ORTHOPAEDIC SURGERY

## 2024-06-04 PROCEDURE — 1157F ADVNC CARE PLAN IN RCRD: CPT | Performed by: ORTHOPAEDIC SURGERY

## 2024-06-04 PROCEDURE — 73502 X-RAY EXAM HIP UNI 2-3 VIEWS: CPT | Mod: RT

## 2024-06-04 ASSESSMENT — PAIN SCALES - GENERAL: PAINLEVEL_OUTOF10: 2

## 2024-06-04 ASSESSMENT — PAIN - FUNCTIONAL ASSESSMENT: PAIN_FUNCTIONAL_ASSESSMENT: 0-10

## 2024-06-04 NOTE — PROGRESS NOTES
ORTHOPEDIC TOTAL JOINT  POST-OPERATIVE FOLLOW UP      ============================  IMPRESSION/PLAN:  ============================  74 y.o. female s/p Right Total Hip Replacement completed on 03/29/2024.    PLAN:  -Weightbearing: Weight bearing as tolerated  -DVT Prophylaxis: No longer needed  -Radiology Studies:  x-rays from today reviewed with the patient  -Therapies: Continue PT/OT, discussed that some the sharp pains she got may be some soreness or cramping from her exercises but overall did not seem to be related to any issues with her implant.  Would recommend continued observation  -Transition off assistive device as seen fit by patient and therapy  -Follow-up:1 year january of surgery with x-rays        Lauren ROSALES Keyatatyana presents today for follow up of joint replacement above. Pain controlled with current treatment plan. Patient has begun physical therapy. They are currently doing therapy at [facility]. They are currently ambulating with  no assistance .   She is doing great overall, but she has been having sharp shooting type pains in her upper leg and just above the knee on the medial side.  She states it doesn't seem to matter her activity level when that pain happens.   Overall does feel like it is improved and she is continue work with physical therapy    Review of Systems:   Constitutional: See HPI for pain assessment, No significant weight loss, recent trauma. Denies fevers/chills  Cardiovascular: No chest pain, shortness of breath  Respiratory: No difficulty breathing, cough  Gastrointestinal: No nausea, vomiting, diarrhea, constipation  Musculoskeletal: Noted in HPI, no arthralgias   Integumentary: No rashes, easy bruising, redness   Neurological: no numbness or tingling in extremities, no gait disturbances     Patient Active Problem List   Diagnosis    Hip pain, right    Hyperlipidemia    Macrocytosis    Osteopenia    Asymmetrical right sensorineural hearing loss    Anemia    Abnormal mammogram    Sacral  fracture (Multi)    Sciatica    Vitamin D deficiency    Iliotibial band syndrome of right side    Tendinopathy of right gluteus medius    Trochanteric bursitis, right hip    Arthralgia of hip    Impaired fasting blood sugar    Primary osteoarthritis of right hip    Status post total replacement of right hip       =================================  EXAM  =================================  GENERAL: A/Ox3, NAD. Appears healthy, well nourished  CARDIAC: regular rate  LUNGS: Breathing non-labored    MUSCULOSKELETAL:  Laterality: right Hip exam  - Strength: Abduction 5/5, Flexion 5/5  - Palpation: non TTP along surgical site  - Incision: No overlying, erythema, induration, or drainage. Incision healing well with no wound dehiscence  - EHL/PF/DF motor intact  - compartments soft, negative homans  - Gait: using no assistive device    NEUROVASCULAR:  - Neurovascular Status: sensation intact to light touch distally  - Capillary refill brisk at extremities, Bilateral dorsalis pedis pulse 2+      IMAGING:  Multi views right hip and pelvis reviewed which demonstrates no signs of loosening or failure right total hip arthroplasty X-rays were personally reviewed by me.  Radiology reports were reviewed by me as well, if available at the time.        Tyler Salazar DO  Attending Surgeon  Joint Replacement and Adult Reconstructive Surgery  [place], [place]

## 2024-06-05 ENCOUNTER — TREATMENT (OUTPATIENT)
Dept: PHYSICAL THERAPY | Facility: CLINIC | Age: 74
End: 2024-06-05
Payer: MEDICARE

## 2024-06-05 DIAGNOSIS — M16.11 PRIMARY OSTEOARTHRITIS OF RIGHT HIP: ICD-10-CM

## 2024-06-05 DIAGNOSIS — Z96.641 STATUS POST TOTAL REPLACEMENT OF RIGHT HIP: ICD-10-CM

## 2024-06-05 PROCEDURE — 97140 MANUAL THERAPY 1/> REGIONS: CPT | Mod: GP

## 2024-06-05 PROCEDURE — 97112 NEUROMUSCULAR REEDUCATION: CPT | Mod: GP

## 2024-06-05 NOTE — PROGRESS NOTES
Physical Therapy Treatment Note     Patient Name: Lauren Fields  MRN: [id number]  Today's Date: 6/5/2024     Time Calculation  Start Time: 0700  Stop Time: 0745  Time Calculation (min): 45 min  PT Therapeutic Procedures Time Entry  Manual Therapy Time Entry: 25  Neuromuscular Re-Education Time Entry: 15  Visit: 11/12  Referred by:  Tyler Salazar DO    Insurance:  [employer]   Certification Dates:  4-15-24 to 7-8-24    Current Problem: [M16.11]    1. Primary osteoarthritis of right hip  Follow Up In Physical Therapy      2. Status post total replacement of right hip  Follow Up In Physical Therapy               Surgery:  3-29-24, posterolateral approach    Subjective:  Pt stated she developed Rt medial knee pain/distal adductor pain after the last visit.   She is frustrated.  She saw Dr. Salazar yesterday & he told her to continue strengthening.  She presented today in a regular wrist neoprene brace & reports improvement.         Patient's Living Environment/PLOF:  Retired , lives alone, very active, bikes, hikes, swims, travels requiring lots of walking, yoga  Patient's Therapy Goals: Return to all of her active pursuits w/o limitation or pain.  She has a trip this summer     Pain:  Intensity: 1-2/10            Location: Lt distal ITB            Duration: Intermittent            Aggravating Factor: Prolonged WB, activity dependant            Alleviating Factor:  Rest, ice, meds    HPI:  Chronic Rt hip pain, progressively deteriorating over time    Social Factors:  1-2 personal factors &/or comorbidities     Precautions: Per Protocol        Objective     Palpation:  + tenderness Lt glute med, Lt ITB, Lt SIJ    Treatments:      Exercises:        NMR:  DN to Left Hip, 6 x 50 mm, Lt glute med, 2x 50 mm Lt ITB, more distal, all needles bidirectionally manipulated and tented, 2 x 40 mm, in Rt Adductor. concurrent with e-stim, 10 Hz, 250 micro, over 15 min, pt in Rt sidelying    Assessment:     Pt  tolerated all activities well.  She stated the DN treatments have been beneficial.  We discussed backing off of the heavier weights but continue with the exercises.  Functional strengthening remains the goal.      Plan:  Continue per POC until end of June, 1-2 x week , to include HEP, self-management, ther ex for LE strengthening, activity progression, modalities for pain relief, posture re-ed, NMR.

## 2024-06-11 ENCOUNTER — TREATMENT (OUTPATIENT)
Dept: PHYSICAL THERAPY | Facility: CLINIC | Age: 74
End: 2024-06-11
Payer: MEDICARE

## 2024-06-11 DIAGNOSIS — Z96.641 STATUS POST TOTAL REPLACEMENT OF RIGHT HIP: ICD-10-CM

## 2024-06-11 DIAGNOSIS — M16.11 PRIMARY OSTEOARTHRITIS OF RIGHT HIP: ICD-10-CM

## 2024-06-11 PROCEDURE — 97535 SELF CARE MNGMENT TRAINING: CPT | Mod: GP

## 2024-06-11 PROCEDURE — 97140 MANUAL THERAPY 1/> REGIONS: CPT | Mod: GP

## 2024-06-11 NOTE — PROGRESS NOTES
Physical Therapy Treatment Note     Patient Name: Lauren Fields  MRN: [id number]  Today's Date: 2024     Time Calculation  Start Time: 1530  Stop Time: 1615  Time Calculation (min): 45 min  PT Therapeutic Procedures Time Entry  Manual Therapy Time Entry: 36  Self-Care/Home Mgmt Trainin    Visit:   Referred by:  Tyler Salazar DO    Insurance:  [employer]   Certification Dates:  4-15-24 to 24    Current Problem: [M16.11]    1. Primary osteoarthritis of right hip  Follow Up In Physical Therapy      2. Status post total replacement of right hip  Follow Up In Physical Therapy               Surgery:  3-29-24, posterolateral approach    Subjective:  Pt stated she developed Rt medial knee pain/distal adductor pain after the last visit.   She is frustrated.  She saw Dr. Salazar yesterday & he told her to continue strengthening.  She presented today in a regular wrist neoprene brace & reports improvement.         Patient's Living Environment/PLOF:  Retired , lives alone, very active, bikes, hikes, swims, travels requiring lots of walking, yoga  Patient's Therapy Goals: Return to all of her active pursuits w/o limitation or pain.  She has a trip this summer     Pain:  Intensity: 1-2/10            Location: Lt distal ITB            Duration: Intermittent            Aggravating Factor: Prolonged WB, activity dependant            Alleviating Factor:  Rest, ice, meds    HPI:  Chronic Rt hip pain, progressively deteriorating over time    Social Factors:  1-2 personal factors &/or comorbidities     Precautions: Per Protocol        Objective     Palpation:  + tenderness Lt glute med, Lt ITB, Lt SIJ    Treatments:      Exercises:        NMR:  DN to Rt Hip, 2 x 50 mm, Lt glute med, 4 x 50 mm Rt ITB, more distal, all needles bidirectionally manipulated and tented, concurrent with e-stim, 10 Hz, 250 micro, over 15 min, pt in Lt sidelying    Manual:  Lumbar & SIJ grade III-IV mobs performed to improve  mobility of Lt Hip and back    Assessment:     Pt reports Rt adductor issue is resolved, and the Lt glute med/max is also better with the exception of an area by the Lt PSIS, soft tissue.   She tolerated all activities well.  She stated the DN treatments have been beneficial.  Reviewed her HEP and discussed reasonable progression to gradually load.  Functional strengthening remains the goal.      Plan:  Continue per POC until end of June, 1-2 x week , to include HEP, self-management, ther ex for LE strengthening, activity progression, modalities for pain relief, posture re-ed, NMR.

## 2024-06-17 ENCOUNTER — TREATMENT (OUTPATIENT)
Dept: PHYSICAL THERAPY | Facility: CLINIC | Age: 74
End: 2024-06-17
Payer: MEDICARE

## 2024-06-17 DIAGNOSIS — M16.11 PRIMARY OSTEOARTHRITIS OF RIGHT HIP: ICD-10-CM

## 2024-06-17 DIAGNOSIS — Z96.641 STATUS POST TOTAL REPLACEMENT OF RIGHT HIP: ICD-10-CM

## 2024-06-17 PROCEDURE — 97112 NEUROMUSCULAR REEDUCATION: CPT | Mod: GP

## 2024-06-17 PROCEDURE — 97140 MANUAL THERAPY 1/> REGIONS: CPT | Mod: GP

## 2024-06-17 NOTE — PROGRESS NOTES
Physical Therapy Treatment Note       Patient Name: Lauren Fields  MRN: [id number]  Today's Date: 6/17/2024     Time Calculation  Start Time: 1400  Stop Time: 1445  Time Calculation (min): 45 min  PT Therapeutic Procedures Time Entry  Manual Therapy Time Entry: 25  Neuromuscular Re-Education Time Entry: 15    Visit: 13/MN  Referred by:  Tyler Salazar DO    Insurance:  [employer]   Certification Dates:  4-15-24 to 7-8-24    Current Problem: [M16.11]    1. Primary osteoarthritis of right hip  Follow Up In Physical Therapy      2. Status post total replacement of right hip  Follow Up In Physical Therapy               Surgery:  3-29-24, posterolateral approach    Subjective:  Pt stated she still has some pain in the lateral Rt hip, but other areas are improving.           Patient's Living Environment/PLOF:  Retired , lives alone, very active, bikes, hikes, swims, travels requiring lots of walking, yoga  Patient's Therapy Goals: Return to all of her active pursuits w/o limitation or pain.  She has a trip this summer     Pain:  Intensity: 1-2/10            Location: Lt distal ITB            Duration: Intermittent            Aggravating Factor: Prolonged WB, activity dependant            Alleviating Factor:  Rest, ice, meds    HPI:  Chronic Rt hip pain, progressively deteriorating over time    Social Factors:  1-2 personal factors &/or comorbidities     Precautions: Per Protocol        Objective     Palpation:  + tenderness Lt glute med, Lt ITB, Lt SIJ    Treatments:      Exercises:      NMR:  DN to Rt Hip, 3 x 50 mm, Lt glute med, 5 x 50 mm Rt ITB, more distal, all needles bidirectionally manipulated and tented, concurrent with e-stim, 10 Hz, 250 micro, over 15 min, pt in Lt sidelying    Manual:  Lumbar & SIJ grade III-IV mobs performed to improve mobility of Lt Hip and back    Assessment:     Pt reports she is back in the gym & strengthening is going well.  No issues during treatment today.  She  stated the DN treatments continue to be beneficial.  Functional strengthening remains the goal for upcoming trips.     Plan:  Continue per POC until end of June, 1-2 x week , to include HEP, self-management, ther ex for LE strengthening, activity progression, modalities for pain relief, posture re-ed, NMR.

## 2024-06-24 ENCOUNTER — TREATMENT (OUTPATIENT)
Dept: PHYSICAL THERAPY | Facility: CLINIC | Age: 74
End: 2024-06-24
Payer: MEDICARE

## 2024-06-24 DIAGNOSIS — Z96.641 STATUS POST TOTAL REPLACEMENT OF RIGHT HIP: ICD-10-CM

## 2024-06-24 DIAGNOSIS — M16.11 PRIMARY OSTEOARTHRITIS OF RIGHT HIP: Primary | ICD-10-CM

## 2024-06-24 PROCEDURE — 97112 NEUROMUSCULAR REEDUCATION: CPT | Mod: GP

## 2024-06-24 PROCEDURE — 97140 MANUAL THERAPY 1/> REGIONS: CPT | Mod: GP

## 2024-06-24 NOTE — PROGRESS NOTES
Physical Therapy Treatment Note       Patient Name: Lauren Fields  MRN: [id number]  Today's Date: 6/24/2024     Time Calculation  Start Time: 1315  Stop Time: 1400  Time Calculation (min): 45 min  PT Therapeutic Procedures Time Entry  Manual Therapy Time Entry: 25  Neuromuscular Re-Education Time Entry: 20    Visit: 14/MN  Referred by:  Tyler Salazar DO    Insurance:  [employer]   Certification Dates:  4-15-24 to 7-8-24    Current Problem: [M16.11]    1. Primary osteoarthritis of right hip  Follow Up In Physical Therapy      2. Status post total replacement of right hip  Follow Up In Physical Therapy               Surgery:  3-29-24, posterolateral approach    Subjective:  Pt stated she is having difficulty with her Lt SIJ.   still has some pain in the lateral Rt hip, but other areas are improving.           Patient's Living Environment/PLOF:  Retired , lives alone, very active, bikes, hikes, swims, travels requiring lots of walking, yoga  Patient's Therapy Goals: Return to all of her active pursuits w/o limitation or pain.  She has a trip this summer     Pain:  Intensity: 1-2/10            Location: Lt distal ITB            Duration: Intermittent            Aggravating Factor: Prolonged WB, activity dependant            Alleviating Factor:  Rest, ice, meds    HPI:  Chronic Rt hip pain, progressively deteriorating over time    Social Factors:  1-2 personal factors &/or comorbidities     Precautions: Per Protocol        Objective     Palpation:  + tenderness Lt glute med, (B) ITB, Lt SIJ    Treatments:      Exercises:      NMR:  DN to Rt Hip, 6 x 50 mm Rt ITB, more distal, 6 x 50 mm Rt ITB, more proximal, all needles bidirectionally manipulated and tented, concurrent with e-stim, 10 Hz, 250 micro, over 15 min, pt in Lt sidelying    Manual:  Lumbar & SIJ grade III-IV mobs performed to improve mobility of Lt Hip and back    Assessment:     Pt reports she will be leaving for vacation this Wed & will  be gone for 2 wks.  Lt hip/LE fatigues quicker than the Rt LE. No issues during treatment today.   Functional strengthening remains the goal for upcoming trips.  Decreased symptoms post treatment.    Plan:  Continue per POC until end of June, 1-2 x week , to include HEP, self-management, ther ex for LE strengthening, activity progression, modalities for pain relief, posture re-ed, NMR.

## 2024-07-11 ENCOUNTER — TREATMENT (OUTPATIENT)
Dept: PHYSICAL THERAPY | Facility: CLINIC | Age: 74
End: 2024-07-11
Payer: MEDICARE

## 2024-07-11 DIAGNOSIS — M16.11 PRIMARY OSTEOARTHRITIS OF RIGHT HIP: ICD-10-CM

## 2024-07-11 DIAGNOSIS — Z96.641 STATUS POST TOTAL REPLACEMENT OF RIGHT HIP: Primary | ICD-10-CM

## 2024-07-11 PROCEDURE — 97140 MANUAL THERAPY 1/> REGIONS: CPT | Mod: GP

## 2024-07-11 NOTE — PROGRESS NOTES
Physical Therapy Treatment Note       Patient Name: Lauren Fields  MRN: [id number]  Today's Date: 7/11/2024     Time Calculation  Start Time: 1045  Stop Time: 1130  Time Calculation (min): 45 min  PT Therapeutic Procedures Time Entry  Manual Therapy Time Entry: 45    Visit: 15/MN  Referred by:  Tyler Salazar DO    Insurance:  [employer]   Certification Dates:  4-15-24 to 7-8-24    Current Problem: [M16.11]    1. Status post total replacement of right hip        2. Primary osteoarthritis of right hip                 Surgery:  3-29-24, posterolateral approach    Subjective:  Pt stated she is having difficulty with her Lt SIJ & ITB, thinks it's getting worse.   She just returned from her trip to [place].     Patient's Living Environment/PLOF:  Retired , lives alone, very active, bikes, hikes, swims, travels requiring lots of walking, yoga  Patient's Therapy Goals: Return to all of her active pursuits w/o limitation or pain.  She has a trip this summer     Pain:  Intensity: 2/10            Location: Lt distal ITB, glute med            Duration: Intermittent            Aggravating Factor: Prolonged WB, activity dependant            Alleviating Factor:  Rest, ice, meds    HPI:  Chronic Rt hip pain, progressively deteriorating over time    Social Factors:  1-2 personal factors &/or comorbidities     Precautions: Per Protocol        Objective     Lt Hamstrings: -12  Rt Hamstrings: -18  JANEY's Lt 23.7 cm, Rt 26.8 cm    Palpation:  + tenderness Lt glute med, (B) ITB, Lt SIJ    Treatments:      Exercises:      Manual:  long axis distraction, (B) LE's, inferior glides, lateral glides, lateral glides with ER, Lumbar & SIJ grade III-IV mobs performed to improve mobility of Lt Hip and back, STM through Lt glute med & ITB    Assessment:     Pt reports she has made an effort to stretch more & feels that she is moving better.  Objective measurements noted above & demonstrates improvement.  Manual performed as  noted for symptoms relief Lt hip.  Decreased symptoms post treatment.    Plan:  Continue per POC until end of June, 1-2 x week , to include HEP, self-management, ther ex for LE strengthening, activity progression, modalities for pain relief, posture re-ed, NMR.

## 2024-07-12 ENCOUNTER — TELEPHONE (OUTPATIENT)
Dept: ORTHOPEDIC SURGERY | Facility: CLINIC | Age: 74
End: 2024-07-12
Payer: MEDICARE

## 2024-07-12 DIAGNOSIS — M54.30 SCIATICA, UNSPECIFIED LATERALITY: ICD-10-CM

## 2024-07-12 DIAGNOSIS — M25.551 HIP PAIN, RIGHT: ICD-10-CM

## 2024-07-12 NOTE — TELEPHONE ENCOUNTER
Patient is s/p Right Total Hip Replacement completed on 03/29/2024.   She states she is having persistent pain in her quad area. She would like to know what she should do?  Please advise.

## 2024-07-15 ENCOUNTER — TELEMEDICINE (OUTPATIENT)
Dept: PRIMARY CARE | Facility: CLINIC | Age: 74
End: 2024-07-15
Payer: MEDICARE

## 2024-07-15 DIAGNOSIS — U07.1 COVID-19: Primary | ICD-10-CM

## 2024-07-15 PROCEDURE — 99213 OFFICE O/P EST LOW 20 MIN: CPT | Performed by: NURSE PRACTITIONER

## 2024-07-15 PROCEDURE — 1160F RVW MEDS BY RX/DR IN RCRD: CPT | Performed by: NURSE PRACTITIONER

## 2024-07-15 PROCEDURE — 1159F MED LIST DOCD IN RCRD: CPT | Performed by: NURSE PRACTITIONER

## 2024-07-15 PROCEDURE — 1036F TOBACCO NON-USER: CPT | Performed by: NURSE PRACTITIONER

## 2024-07-15 PROCEDURE — 1157F ADVNC CARE PLAN IN RCRD: CPT | Performed by: NURSE PRACTITIONER

## 2024-07-15 RX ORDER — DICLOFENAC SODIUM 10 MG/G
4 GEL TOPICAL 4 TIMES DAILY PRN
Qty: 100 G | Refills: 1 | Status: SHIPPED | OUTPATIENT
Start: 2024-07-15

## 2024-07-15 RX ORDER — VALACYCLOVIR HYDROCHLORIDE 500 MG/1
500 TABLET, FILM COATED ORAL DAILY
COMMUNITY
Start: 2024-06-17 | End: 2024-07-19 | Stop reason: SDUPTHER

## 2024-07-15 ASSESSMENT — ENCOUNTER SYMPTOMS
SWOLLEN GLANDS: 1
CHILLS: 1
SHORTNESS OF BREATH: 0
ABDOMINAL PAIN: 0
NECK PAIN: 0
FEVER: 1
WHEEZING: 0
SORE THROAT: 1
CHEST TIGHTNESS: 1
HOARSE VOICE: 1
FATIGUE: 1
MYALGIAS: 0
NAUSEA: 0
STRIDOR: 0
DIARRHEA: 0
COUGH: 1
HEADACHES: 0
VOMITING: 0
TROUBLE SWALLOWING: 1

## 2024-07-15 ASSESSMENT — PATIENT HEALTH QUESTIONNAIRE - PHQ9
1. LITTLE INTEREST OR PLEASURE IN DOING THINGS: NOT AT ALL
2. FEELING DOWN, DEPRESSED OR HOPELESS: NOT AT ALL
SUM OF ALL RESPONSES TO PHQ9 QUESTIONS 1 AND 2: 0

## 2024-07-15 NOTE — PROGRESS NOTES
"Subjective   Lauren Fields is a 74 y.o. female who presents for covid positive (Virtual visit- current symptoms, sore throat, congestion, sneezing, water in eyes, feverish, chills, cough, chest tightness and sinus pressure. Covid tested Saturday and was negative and tested today and was positive. Symptoms started \"a couple weeks ago\" but Saturday is when the symptoms got worse.).    An interactive audio and video telecommunication system which permits real time communications between the patient (at the originating site) and provider (at the distant site) was utilized to provide this telehealth service.    Verbal consent was requested and obtained from Lauren Fields for a telehealth visit. All issues as below were discussed and addressed but no physical exam was performed. If it was felt that the patient should be evaluated in the clinic then they were directed there. The patient verbally consented to the visit.  She was located in the [facility] for the visit.         Sore Throat   The current episode started in the past 7 days. The problem has been rapidly worsening. The pain is worse on the left side. The maximum temperature recorded prior to her arrival was 100 - 100.9 F. The fever has been present for 3 to 4 days. The pain is at a severity of 10/10. The pain is moderate. Associated symptoms include congestion, coughing, a hoarse voice, a plugged ear sensation, swollen glands and trouble swallowing. Pertinent negatives include no abdominal pain, diarrhea, drooling, ear discharge, ear pain, headaches, neck pain, shortness of breath, stridor or vomiting.       She presents today by virtual visit for COVID-19. She reports she recently traveled to [place] about 2 weeks ago and noticed a very mild dry throat and cough but felt fine otherwise. Did not think anything of it.   Went to event on Friday night and felt fine.   Then Saturday morning when she woke up she felt awful.   (+) severe sore throat  (+) " congestion/runny nose/stuffiness  (+)cough  No SOB or wheezing.  (+) very mild chest tightness  (+) fatigue  (+) fever and chills tmax 100.7  No body aches or headache.  No loss of taste/ (+) decreased smell  No abdominal pain, nausea, vomiting or diarrhea.  Has been taking Sudafed and Advil.   Took a COVID test Saturday which was negative and took today and was (+)  Is interested in Paxlovid- took in 1/2024 and did well- mild nausea was the only side effect,    Lab Results   Component Value Date    GLUCOSE 104 (H) 03/30/2024    CALCIUM 8.8 03/30/2024     03/30/2024    K 4.5 03/30/2024    CO2 28 03/30/2024     03/30/2024    BUN 13 03/30/2024    CREATININE 0.83 03/30/2024        Review of Systems   Constitutional:  Positive for chills, fatigue and fever.   HENT:  Positive for congestion, hoarse voice, postnasal drip, sneezing, sore throat and trouble swallowing. Negative for drooling, ear discharge and ear pain.    Respiratory:  Positive for cough and chest tightness. Negative for shortness of breath, wheezing and stridor.    Gastrointestinal:  Negative for abdominal pain, diarrhea, nausea and vomiting.   Musculoskeletal:  Negative for myalgias and neck pain.   Neurological:  Negative for headaches.     Objective   There were no vitals taken for this visit.    Physical Exam  Constitutional:       General: She is not in acute distress.     Appearance: Normal appearance. She is not toxic-appearing.   Pulmonary:      Effort: Pulmonary effort is normal. No respiratory distress.   Neurological:      Mental Status: She is alert and oriented to person, place, and time.   Psychiatric:         Mood and Affect: Mood normal.         Behavior: Behavior normal.         Thought Content: Thought content normal.         Judgment: Judgment normal.         Assessment/Plan   Problem List Items Addressed This Visit    None  Visit Diagnoses       COVID-19    -  Primary    Relevant Medications    nirmatrelvir-ritonavir  (PAXLOVID) 300 mg (150 mg x 2)-100 mg tablet therapy pack        Reviewed recent kidney function (3/2024) and current medications for interactions.  Discussed taking Mucinex to help with congestion.  Reviewed signs and symptoms that indicate the need to go to ER.    It has been a pleasure seeing you today!

## 2024-07-15 NOTE — TELEPHONE ENCOUNTER
Spoke to patient and she states that she tested positive for covid today but the pain has gotten better since her call to the office. She advised that she will call if the pain returns.

## 2024-07-18 ENCOUNTER — APPOINTMENT (OUTPATIENT)
Dept: PHYSICAL THERAPY | Facility: CLINIC | Age: 74
End: 2024-07-18
Payer: MEDICARE

## 2024-07-19 DIAGNOSIS — B00.9 HSV INFECTION: ICD-10-CM

## 2024-07-19 RX ORDER — VALACYCLOVIR HYDROCHLORIDE 500 MG/1
500 TABLET, FILM COATED ORAL DAILY
Qty: 30 TABLET | Refills: 0 | Status: SHIPPED | OUTPATIENT
Start: 2024-07-19 | End: 2024-08-18

## 2024-07-23 ENCOUNTER — TELEMEDICINE (OUTPATIENT)
Dept: ORTHOPEDIC SURGERY | Facility: HOSPITAL | Age: 74
End: 2024-07-23
Payer: MEDICARE

## 2024-07-23 DIAGNOSIS — M67.952 TENDINOPATHY OF LEFT GLUTEUS MEDIUS: ICD-10-CM

## 2024-07-23 DIAGNOSIS — M67.951 TENDINOPATHY OF RIGHT GLUTEUS MEDIUS: Primary | ICD-10-CM

## 2024-07-23 DIAGNOSIS — M76.31 ILIOTIBIAL BAND SYNDROME OF BOTH SIDES: ICD-10-CM

## 2024-07-23 DIAGNOSIS — M76.32 ILIOTIBIAL BAND SYNDROME OF BOTH SIDES: ICD-10-CM

## 2024-07-23 PROCEDURE — 1157F ADVNC CARE PLAN IN RCRD: CPT | Performed by: STUDENT IN AN ORGANIZED HEALTH CARE EDUCATION/TRAINING PROGRAM

## 2024-07-23 PROCEDURE — 99441 PR PHYS/QHP TELEPHONE EVALUATION 5-10 MIN: CPT | Performed by: STUDENT IN AN ORGANIZED HEALTH CARE EDUCATION/TRAINING PROGRAM
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
## 2024-07-30 ENCOUNTER — APPOINTMENT (OUTPATIENT)
Dept: OBSTETRICS AND GYNECOLOGY | Facility: CLINIC | Age: 74
End: 2024-07-30
Payer: MEDICARE

## 2024-07-30 VITALS
HEIGHT: 62 IN | SYSTOLIC BLOOD PRESSURE: 112 MMHG | BODY MASS INDEX: 23.77 KG/M2 | WEIGHT: 129.2 LBS | DIASTOLIC BLOOD PRESSURE: 60 MMHG

## 2024-07-30 DIAGNOSIS — Z78.0 MENOPAUSE: ICD-10-CM

## 2024-07-30 DIAGNOSIS — B00.9 HSV INFECTION: ICD-10-CM

## 2024-07-30 PROCEDURE — 1160F RVW MEDS BY RX/DR IN RCRD: CPT | Performed by: OBSTETRICS & GYNECOLOGY

## 2024-07-30 PROCEDURE — 1159F MED LIST DOCD IN RCRD: CPT | Performed by: OBSTETRICS & GYNECOLOGY

## 2024-07-30 PROCEDURE — G0101 CA SCREEN;PELVIC/BREAST EXAM: HCPCS | Performed by: OBSTETRICS & GYNECOLOGY

## 2024-07-30 PROCEDURE — 1036F TOBACCO NON-USER: CPT | Performed by: OBSTETRICS & GYNECOLOGY

## 2024-07-30 PROCEDURE — 1157F ADVNC CARE PLAN IN RCRD: CPT | Performed by: OBSTETRICS & GYNECOLOGY

## 2024-07-30 RX ORDER — VALACYCLOVIR HYDROCHLORIDE 500 MG/1
500 TABLET, FILM COATED ORAL DAILY
Qty: 30 TABLET | Refills: 0 | Status: SHIPPED | OUTPATIENT
Start: 2024-07-30 | End: 2024-08-29

## 2024-07-30 NOTE — PROGRESS NOTES
Subjective   Patient ID: Lauren Fields is a 74 y.o. female who presents for No chief complaint on file..  HPI74 y.o. G0 not sexually active here for annual.  No c/o.   She recently had spine surgery, and hip surgery.       Review of Systems   All other systems reviewed and are negative.      Objective   Physical Exam  Vitals reviewed.   Constitutional:       Appearance: Normal appearance.   HENT:      Head: Normocephalic and atraumatic.      Nose: Nose normal.   Cardiovascular:      Rate and Rhythm: Normal rate and regular rhythm.   Pulmonary:      Effort: Pulmonary effort is normal.      Breath sounds: Normal breath sounds.   Chest:      Chest wall: No mass.   Breasts:     Right: Normal.      Left: Normal.   Abdominal:      General: Abdomen is flat. Bowel sounds are normal. There is no distension.      Palpations: Abdomen is soft. There is no mass.   Genitourinary:     General: Normal vulva.      Vagina: Normal.      Cervix: Normal.      Uterus: Normal.       Adnexa: Right adnexa normal and left adnexa normal.      Rectum: Normal.      Comments: Uterus atrophic small  Musculoskeletal:         General: Normal range of motion.      Cervical back: Normal range of motion.   Skin:     General: Skin is warm and dry.   Neurological:      General: No focal deficit present.      Mental Status: She is alert.   Psychiatric:         Mood and Affect: Mood normal.         Behavior: Behavior normal.         Assessment/Plan   Problem List Items Addressed This Visit    None  Visit Diagnoses         Codes    HSV infection     B00.9    Relevant Medications    valACYclovir (Valtrex) 500 mg tablet    Menopause     Z78.0    Relevant Medications    estrogens, conjugated, (Premarin) vaginal cream (Start on 8/1/2024)        Preventive care exam.   No pap done.   HPV negative.   Encouraged self breast exam monthly, healthy diet and regular activities and exercise.   Follow up as needed or every other year.   Refills of Valtrex and Premarin  cream provided.          Emily Alvarez MD 07/30/24 1:22 PM

## 2024-08-07 ENCOUNTER — OFFICE VISIT (OUTPATIENT)
Dept: ORTHOPEDIC SURGERY | Facility: HOSPITAL | Age: 74
End: 2024-08-07
Payer: MEDICARE

## 2024-08-07 ENCOUNTER — APPOINTMENT (OUTPATIENT)
Dept: PHYSICAL THERAPY | Facility: CLINIC | Age: 74
End: 2024-08-07
Payer: MEDICARE

## 2024-08-07 ENCOUNTER — HOSPITAL ENCOUNTER (OUTPATIENT)
Dept: RADIOLOGY | Facility: EXTERNAL LOCATION | Age: 74
Discharge: HOME | End: 2024-08-07

## 2024-08-07 DIAGNOSIS — M76.31 ILIOTIBIAL BAND SYNDROME OF RIGHT SIDE: ICD-10-CM

## 2024-08-07 DIAGNOSIS — M67.951 TENDINOPATHY OF RIGHT GLUTEUS MEDIUS: ICD-10-CM

## 2024-08-07 DIAGNOSIS — M76.32 ILIOTIBIAL BAND SYNDROME OF BOTH SIDES: ICD-10-CM

## 2024-08-07 DIAGNOSIS — M76.31 ILIOTIBIAL BAND SYNDROME OF BOTH SIDES: ICD-10-CM

## 2024-08-07 DIAGNOSIS — M67.952 TENDINOPATHY OF LEFT GLUTEUS MEDIUS: Primary | ICD-10-CM

## 2024-08-07 PROCEDURE — 1159F MED LIST DOCD IN RCRD: CPT | Performed by: STUDENT IN AN ORGANIZED HEALTH CARE EDUCATION/TRAINING PROGRAM

## 2024-08-07 PROCEDURE — 99214 OFFICE O/P EST MOD 30 MIN: CPT | Performed by: STUDENT IN AN ORGANIZED HEALTH CARE EDUCATION/TRAINING PROGRAM

## 2024-08-07 PROCEDURE — 99214 OFFICE O/P EST MOD 30 MIN: CPT | Mod: GC | Performed by: STUDENT IN AN ORGANIZED HEALTH CARE EDUCATION/TRAINING PROGRAM

## 2024-08-07 PROCEDURE — 2500000004 HC RX 250 GENERAL PHARMACY W/ HCPCS (ALT 636 FOR OP/ED): Performed by: STUDENT IN AN ORGANIZED HEALTH CARE EDUCATION/TRAINING PROGRAM

## 2024-08-07 PROCEDURE — 20611 DRAIN/INJ JOINT/BURSA W/US: CPT | Mod: LT | Performed by: STUDENT IN AN ORGANIZED HEALTH CARE EDUCATION/TRAINING PROGRAM

## 2024-08-07 PROCEDURE — 2500000005 HC RX 250 GENERAL PHARMACY W/O HCPCS: Performed by: STUDENT IN AN ORGANIZED HEALTH CARE EDUCATION/TRAINING PROGRAM

## 2024-08-07 PROCEDURE — 1157F ADVNC CARE PLAN IN RCRD: CPT | Performed by: STUDENT IN AN ORGANIZED HEALTH CARE EDUCATION/TRAINING PROGRAM

## 2024-08-07 RX ORDER — LIDOCAINE HYDROCHLORIDE 10 MG/ML
3 INJECTION, SOLUTION EPIDURAL; INFILTRATION; INTRACAUDAL; PERINEURAL
Status: COMPLETED | OUTPATIENT
Start: 2024-08-07 | End: 2024-08-07

## 2024-08-07 RX ORDER — METHYLPREDNISOLONE ACETATE 40 MG/ML
40 INJECTION, SUSPENSION INTRA-ARTICULAR; INTRALESIONAL; INTRAMUSCULAR; SOFT TISSUE
Status: COMPLETED | OUTPATIENT
Start: 2024-08-07 | End: 2024-08-07

## 2024-08-07 NOTE — PROGRESS NOTES
Patient ID: Lauren Fields is a 74 y.o. female.    Large Jt Asp/Inj: L greater trochanteric bursa on 8/7/2024 11:00 AM  Details: ultrasound-guided  Medications: 3 mL lidocaine PF 10 mg/mL (1 %); 40 mg methylPREDNISolone acetate 40 mg/mL    Pre-Procedure Diagnosis: left lateral hip pain, left gluteus medius tendinopathy and greater trochanteric bursopathy  Post-Procedure Diagnosis: left lateral hip pain, left gluteus medius tendinopathy and greater trochanteric bursopathy    Procedure: US-guided gluteus medius tendon sheath and trochanteric bursa corticosteroid injection    History of Present Illness: Lauren Fields is a pleasant 74 y.o. female with lateral hip pain due to gluteus medius tendinopathy and greater trochanteric bursopathy who is here for the above procedure for diagnostic purposes and improved pain control.      Medications and allergies were reviewed with the patient. No contraindications were identified.    Informed Consent  Following denial of allergy and review of potential side effects and complications including but not necessarily limited to infection, allergic reaction, local tissue breakdown, injury to soft tissue and/or nerves and seizure, the patient indicated understanding and agreed to proceed. Written consent to treatment was obtained and the patient verbalized consent for the procedure.    Procedural Details  The use of direct ultrasound visualization of the needle (rather than a non-guided injection) was required to increase patient safety by excluding inadvertent intramuscular or intratendinous placement and minimizing bleeding by avoiding osteochondral or vascular injury from the needle.  Additionally, the increased accuracy of placement may increase clinical effectiveness and will allow higher diagnostic specificity when evaluating effectiveness of this injection.    Using ultrasound, a pre-scan of the region was performed to identify the target structure.     The area was prepped with  chlorhexidine, then re-examined using the same transducer, a sterile ultrasound transducer cover, and sterile ultrasound gel.    Procedural pause conducted to verify: Correct patient identity, procedure to be performed, and as applicable, correct side and site, correct patient position, and availability of implants, special equipment, or special requirements.    Procedure:   Transducer: Linear array transducer.  Patient position: Lateral decubitus with the affected lateral hip facing the ceiling.  Localization process: With the transducer in an anatomic transverse plane over the greater trochanter, the trochanteric bursa was localized in a long axis view superficial to the gluteus medius tendon and deep to the gluteus toño muscle/ITB.  Local anesthesia: Local anesthesia was obtained with 2 cc of 1% lidocaine.  Needle: A 25-gauge, 3.5-inch needle was used for local anesthesia and for the injectate.  Approach: A posterior to anterior, in plane, approach was used to guide the needle tip into the gluteus medius tendon sheath and subsequently the trochanteric bursa.  Injection/Aspiration: A mixture of 2 cc of 0.5% ropivocaine and 1 cc of DepoMedrol (40mg/mL) was injected into the left gluteus medius tendon sheath and greater trochanteric bursa without complication.  Good flow was observed within the bursa.    Post-procedural care: The patient tolerated the procedure well and reported complete pain relief during the anesthetic phase. The patient was asked to ice for improved pain control and avoid submerging the area in water for the next 48 hours to help reduce the risk of infection. The patient was instructed to call the office immediately if there are any questions or concerns.     PATIENT EDUCATION:  Education of the diagnosis and treatment plan was discussed at today's appointment. A learning needs assessment was performed. The patient demonstrated understanding.
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
## 2024-08-09 ENCOUNTER — TREATMENT (OUTPATIENT)
Dept: PHYSICAL THERAPY | Facility: CLINIC | Age: 74
End: 2024-08-09
Payer: MEDICARE

## 2024-08-09 DIAGNOSIS — M16.11 PRIMARY OSTEOARTHRITIS OF RIGHT HIP: Primary | ICD-10-CM

## 2024-08-09 PROCEDURE — 97140 MANUAL THERAPY 1/> REGIONS: CPT | Mod: GP

## 2024-08-09 NOTE — PROGRESS NOTES
Physical Therapy Treatment/Progress Note     Patient Name: Lauren Fields  MRN: [id number]  Today's Date: 8/9/2024     Time Calculation  Start Time: 0845  Stop Time: 0930  Time Calculation (min): 45 min  PT Therapeutic Procedures Time Entry  Manual Therapy Time Entry: 45    Visit: 15/MN  Referred by:  Tyler Salazar DO    Insurance:  [employer]   Certification Dates:  4-15-24 to 7-8-24    Current Problem: [M16.11]    1. Primary osteoarthritis of right hip                 Surgery:  3-29-24, posterolateral approach    Subjective:  Pt stated she is having difficulty with her Lt SIJ.  She got a shot earlier this week & reports some improvement.       Patient's Living Environment/PLOF:  Retired , lives alone, very active, bikes, hikes, swims, travels requiring lots of walking, yoga  Patient's Therapy Goals: Return to all of her active pursuits w/o limitation or pain.  She has a trip this summer     Pain:  Intensity: 1-2/10            Location: Lt distal ITB, glute med            Duration: Intermittent            Aggravating Factor: Prolonged WB, activity dependant            Alleviating Factor:  Rest, ice, meds    HPI:  Chronic Rt hip pain, progressively deteriorating over time    Social Factors:  1-2 personal factors &/or comorbidities     Precautions: Per Protocol        Objective     Palpation:  + tenderness Lt glute med, (B) ITB, Lt SIJ    Treatments:      Exercises:      Manual:  long axis distraction, (B) LE's, inferior glides, lateral glides, lateral glides with ER, Lumbar & SIJ grade III-IV mobs performed to improve mobility of Lt Hip and back, STM through Lt glute med & ITB, SIJ mobs LT, shot gun technique to Lt, 10 x 5 sec hold    Assessment:     Pt reports she feeling much better after manual today.  Manual performed as noted for symptoms relief Lt hip.  She is leaving for [place] next week, will resume PT when she returns.     Plan:  Continue per POC until end of June, 1-2 x week , to  include HEP, self-management, ther ex for LE strengthening, activity progression, modalities for pain relief, posture re-ed, NMR.       Goals:   Active       PT Problem       PT Goal 1 (Met)       Start:  04/15/24    Expected End:  07/08/24    Resolved:  05/28/24    Pt independent with HEP and consistent for optimal recovery and self-management after DC from PT.             PT Goal 2 (Progressing)       Start:  04/15/24    Expected End:  07/08/24       Patient will increase Rt LE/hip strength to 5/5 to improve functional mobility for travel plans, community ambulation.          PT Goal 3 (Progressing)       Start:  04/15/24    Expected End:  07/08/24       Pt will increase LEFS score to > or = 70% to demonstrate functional improvement.           PT Goal 4 (Met)       Start:  04/15/24    Expected End:  07/08/24    Resolved:  05/28/24    Pt will improve Rt LE/Hip AROM to > Lt LE/Hip AROM all movements to improve functional mobiity, dressing, getting in/out small areas, tub, car.

## 2024-08-29 ENCOUNTER — APPOINTMENT (OUTPATIENT)
Dept: PRIMARY CARE | Facility: CLINIC | Age: 74
End: 2024-08-29
Payer: MEDICARE

## 2024-08-29 VITALS
SYSTOLIC BLOOD PRESSURE: 112 MMHG | DIASTOLIC BLOOD PRESSURE: 70 MMHG | WEIGHT: 125.6 LBS | TEMPERATURE: 98 F | BODY MASS INDEX: 23.11 KG/M2 | HEIGHT: 62 IN

## 2024-08-29 DIAGNOSIS — R73.01 IMPAIRED FASTING BLOOD SUGAR: ICD-10-CM

## 2024-08-29 DIAGNOSIS — M25.551 HIP PAIN, RIGHT: ICD-10-CM

## 2024-08-29 DIAGNOSIS — Z00.00 WELL ADULT EXAM: Primary | ICD-10-CM

## 2024-08-29 DIAGNOSIS — Z78.0 POST-MENOPAUSAL: ICD-10-CM

## 2024-08-29 DIAGNOSIS — M54.32 SCIATICA OF LEFT SIDE: ICD-10-CM

## 2024-08-29 DIAGNOSIS — E22.2 SYNDROME OF INAPPROPRIATE SECRETION OF ANTIDIURETIC HORMONE (MULTI): ICD-10-CM

## 2024-08-29 DIAGNOSIS — M70.61 TROCHANTERIC BURSITIS, RIGHT HIP: ICD-10-CM

## 2024-08-29 DIAGNOSIS — E78.5 HYPERLIPIDEMIA, UNSPECIFIED HYPERLIPIDEMIA TYPE: ICD-10-CM

## 2024-08-29 PROCEDURE — 1157F ADVNC CARE PLAN IN RCRD: CPT | Performed by: INTERNAL MEDICINE

## 2024-08-29 PROCEDURE — G0439 PPPS, SUBSEQ VISIT: HCPCS | Performed by: INTERNAL MEDICINE

## 2024-08-29 PROCEDURE — 1170F FXNL STATUS ASSESSED: CPT | Performed by: INTERNAL MEDICINE

## 2024-08-29 PROCEDURE — 1159F MED LIST DOCD IN RCRD: CPT | Performed by: INTERNAL MEDICINE

## 2024-08-29 PROCEDURE — 1160F RVW MEDS BY RX/DR IN RCRD: CPT | Performed by: INTERNAL MEDICINE

## 2024-08-29 PROCEDURE — 99397 PER PM REEVAL EST PAT 65+ YR: CPT | Performed by: INTERNAL MEDICINE

## 2024-08-29 PROCEDURE — 1124F ACP DISCUSS-NO DSCNMKR DOCD: CPT | Performed by: INTERNAL MEDICINE

## 2024-08-29 ASSESSMENT — PATIENT HEALTH QUESTIONNAIRE - PHQ9
SUM OF ALL RESPONSES TO PHQ9 QUESTIONS 1 AND 2: 0
2. FEELING DOWN, DEPRESSED OR HOPELESS: NOT AT ALL
1. LITTLE INTEREST OR PLEASURE IN DOING THINGS: NOT AT ALL
SUM OF ALL RESPONSES TO PHQ9 QUESTIONS 1 AND 2: 0
2. FEELING DOWN, DEPRESSED OR HOPELESS: NOT AT ALL
1. LITTLE INTEREST OR PLEASURE IN DOING THINGS: NOT AT ALL

## 2024-08-29 ASSESSMENT — ACTIVITIES OF DAILY LIVING (ADL)
DRESSING: INDEPENDENT
DOING_HOUSEWORK: INDEPENDENT
MANAGING_FINANCES: INDEPENDENT
TAKING_MEDICATION: INDEPENDENT
BATHING: INDEPENDENT
GROCERY_SHOPPING: INDEPENDENT

## 2024-08-29 NOTE — PROGRESS NOTES
"Subjective   Reason for Visit: Lauren Fields is an 74 y.o. female here for a Medicare Wellness visit.     Past Medical, Surgical, and Family History reviewed and updated in chart.    Reviewed all medications by prescribing practitioner or clinical pharmacist (such as prescriptions, OTCs, herbal therapies and supplements) and documented in the medical record.    [facility]    Patient Care Team:  Ann Morgan MD as PCP - General  Ann Morgan MD as PCP - [employer] PCP  Naomie Philippe RN as Registered Nurse (Orthopaedic Surgery)  Ambreen Ortega RN as Registered Nurse (Orthopaedic Surgery)     Review of Systems    Objective   Vitals:  /70   Temp 36.7 °C (98 °F)   Ht 1.581 m (5' 2.25\")   Wt 57 kg (125 lb 9.6 oz)   BMI 22.79 kg/m²       Physical Exam  Constitutional:       General: She is not in acute distress.     Appearance: Normal appearance. She is not ill-appearing.   HENT:      Head: Normocephalic and atraumatic.      Right Ear: Tympanic membrane and ear canal normal.      Left Ear: Tympanic membrane and ear canal normal.      Nose: Nose normal.      Mouth/Throat:      Mouth: Mucous membranes are moist.      Pharynx: Oropharynx is clear.   Eyes:      General: No scleral icterus.     Extraocular Movements: Extraocular movements intact.      Conjunctiva/sclera: Conjunctivae normal.      Pupils: Pupils are equal, round, and reactive to light.   Cardiovascular:      Rate and Rhythm: Normal rate and regular rhythm.      Pulses: Normal pulses.      Heart sounds: No murmur heard.     No friction rub.   Pulmonary:      Effort: Pulmonary effort is normal.      Breath sounds: Normal breath sounds. No rhonchi or rales.   Abdominal:      General: Abdomen is flat. Bowel sounds are normal. There is no distension.      Palpations: Abdomen is soft. There is no mass.      Tenderness: There is no abdominal tenderness.   Musculoskeletal:      Cervical back: Normal range of motion and neck supple.      Right " lower leg: No edema.      Left lower leg: No edema.   Skin:     General: Skin is warm.   Neurological:      General: No focal deficit present.      Mental Status: She is alert and oriented to person, place, and time.      Cranial Nerves: No cranial nerve deficit.   Psychiatric:         Mood and Affect: Mood normal.         Behavior: Behavior normal.         Judgment: Judgment normal.           Lab Results   Component Value Date    WBC 5.3 03/30/2024    HGB 10.9 (L) 03/30/2024    HCT 32.9 (L) 03/30/2024     03/30/2024    CHOL 264 (H) 12/22/2023    TRIG 83 12/22/2023    HDL 85.0 12/22/2023    ALT 19 12/22/2023    AST 21 02/11/2019     03/30/2024    K 4.5 03/30/2024     03/30/2024    CREATININE 0.83 03/30/2024    BUN 13 03/30/2024    CO2 28 03/30/2024    TSH 1.51 12/22/2023    HGBA1C 5.8 (H) 12/22/2023       === 08/03/21 ===    - Impression -  The BMD measured at Femur Neck is 0.779 g/cm2 with a T-score of -1.9.  This patient is considered to have low bone mass according to World  Health Organization (WHO) criteria.  Bone density is between 10 and  25% below young normal.  Treatment is advised.    With a Z-score of -0.1, this patient's BMD is considered within  normal limits relative to their age.  Even so, they may be considered  osteopenic or osteoporotic, which is normal for this age.  .  Bone mineral density in the lumbar spine may be falsely elevated  secondary to discogenic degenerative disease and degenerative facet  sclerosis.    Follow-up DEXA and treatment is recommended as clinically indicated.  .  All images and detailed analysis are available on the  Radiology  PACS.*    *For patients with comparison exams obtained from [facility] prior  to December 2006, measurements presented in this report have been  modified to reflect more accurate trend analysis when compared to  current data obtained on the [facility]EXA.    Assessment/Plan   Problem List Items Addressed This Visit     None    #1 HSV- stable. Prn Rx  #2 lumbar spine disease/djd (hip)- Uncomplicated.  Stable/doing well. f/u spine surgery, ortho. Sports med, PT.   #3 vitamin D deficiency-continue.    #4 osteopenia-reviewed. Recheck bone density. Vitamin D, calcium and exercise  #5 hyperlipidemia-elevated LDL.  ASCVD risk score~10%. CACS=0.  Reviewed pros and cons of treatment.  I would lean towards treating at this point.  Reviewed that with patient.  She  understands but declines.  We will retest today  #6 rt RC- f/u ortho  #7 hip pain- f/u ortho  #8 colon polyps- last 2020. f/u 2025  #9 impaired fasting blood sugar-reviewed.  Discussed low sugar/low carbohydrate diet with daily exercise.  Discussed option of metformin, declines.  Retest

## 2024-08-30 PROBLEM — E22.2 SYNDROME OF INAPPROPRIATE SECRETION OF ANTIDIURETIC HORMONE (MULTI): Status: ACTIVE | Noted: 2024-08-30

## 2024-08-30 ASSESSMENT — ACTIVITIES OF DAILY LIVING (ADL)
BATHING: INDEPENDENT
MANAGING_FINANCES: INDEPENDENT
DRESSING: INDEPENDENT
DOING_HOUSEWORK: INDEPENDENT
TAKING_MEDICATION: INDEPENDENT
GROCERY_SHOPPING: INDEPENDENT

## 2024-08-30 ASSESSMENT — PATIENT HEALTH QUESTIONNAIRE - PHQ9
1. LITTLE INTEREST OR PLEASURE IN DOING THINGS: NOT AT ALL
SUM OF ALL RESPONSES TO PHQ9 QUESTIONS 1 AND 2: 0
2. FEELING DOWN, DEPRESSED OR HOPELESS: NOT AT ALL

## 2024-09-04 ENCOUNTER — OFFICE VISIT (OUTPATIENT)
Dept: ORTHOPEDIC SURGERY | Facility: HOSPITAL | Age: 74
End: 2024-09-04
Payer: MEDICARE

## 2024-09-04 DIAGNOSIS — M67.952 TENDINOPATHY OF LEFT GLUTEUS MEDIUS: ICD-10-CM

## 2024-09-04 DIAGNOSIS — M67.951 TENDINOPATHY OF RIGHT GLUTEUS MEDIUS: Primary | ICD-10-CM

## 2024-09-04 PROCEDURE — 99213 OFFICE O/P EST LOW 20 MIN: CPT | Performed by: STUDENT IN AN ORGANIZED HEALTH CARE EDUCATION/TRAINING PROGRAM

## 2024-09-04 PROCEDURE — 99213 OFFICE O/P EST LOW 20 MIN: CPT | Mod: GC | Performed by: STUDENT IN AN ORGANIZED HEALTH CARE EDUCATION/TRAINING PROGRAM

## 2024-09-04 PROCEDURE — 1157F ADVNC CARE PLAN IN RCRD: CPT | Performed by: STUDENT IN AN ORGANIZED HEALTH CARE EDUCATION/TRAINING PROGRAM

## 2024-09-04 PROCEDURE — 1123F ACP DISCUSS/DSCN MKR DOCD: CPT | Performed by: STUDENT IN AN ORGANIZED HEALTH CARE EDUCATION/TRAINING PROGRAM

## 2024-09-04 PROCEDURE — 1159F MED LIST DOCD IN RCRD: CPT | Performed by: STUDENT IN AN ORGANIZED HEALTH CARE EDUCATION/TRAINING PROGRAM

## 2024-09-04 ASSESSMENT — PAIN SCALES - GENERAL: PAINLEVEL_OUTOF10: 5 - MODERATE PAIN

## 2024-09-04 ASSESSMENT — PAIN - FUNCTIONAL ASSESSMENT: PAIN_FUNCTIONAL_ASSESSMENT: 0-10
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
## 2024-09-06 ENCOUNTER — TREATMENT (OUTPATIENT)
Dept: PHYSICAL THERAPY | Facility: CLINIC | Age: 74
End: 2024-09-06
Payer: MEDICARE

## 2024-09-06 DIAGNOSIS — M16.11 PRIMARY OSTEOARTHRITIS OF RIGHT HIP: Primary | ICD-10-CM

## 2024-09-06 PROCEDURE — 97140 MANUAL THERAPY 1/> REGIONS: CPT | Mod: GP

## 2024-09-06 NOTE — PROGRESS NOTES
Physical Therapy Treatment Note     Patient Name: Lauren Fields  MRN: [id number]  Today's Date: 9/6/2024     Time Calculation  Start Time: 0845  Stop Time: 0930  Time Calculation (min): 45 min  PT Therapeutic Procedures Time Entry  Manual Therapy Time Entry: 40    Visit: 16/MN  Referred by:  Tyler Salazar DO    Insurance:  [employer]   Certification Dates:  4-15-24 to 10-31-24    Current Problem: [M16.11]    1. Primary osteoarthritis of right hip  Follow Up In Physical Therapy               Surgery:  3-29-24, posterolateral approach    Subjective:  Pt stated she is doing better.  She is back from vacationing.    Patient's Living Environment/PLOF:  Retired , lives alone, very active, bikes, hikes, swims, travels requiring lots of walking, yoga  Patient's Therapy Goals: Return to all of her active pursuits w/o limitation or pain.  She has a trip this summer     Pain:  Intensity: 1-2/10            Location: Lt distal ITB, glute med            Duration: Intermittent            Aggravating Factor: Prolonged WB, activity dependant            Alleviating Factor:  Rest, ice, meds    HPI:  Chronic Rt hip pain, progressively deteriorating over time    Social Factors:  1-2 personal factors &/or comorbidities     Precautions: Per Protocol        Objective     Palpation:  + tenderness Lt glute med, (B) ITB, Lt SIJ    Treatments:      Exercises:      Manual:  long axis distraction, (B) LE's, inferior glides, lateral glides, lateral glides with ER, Lumbar & SIJ grade III-IV mobs performed to improve mobility of Lt Hip and back, STM through Lt glute med & ITB, SIJ mobs LT    Assessment:     Pt reports she feeling much better after her trip and manual today.  She has another trip coming up Sept 23.  She is functionally doing better, activity tolerance with hiking she reported no limitations.  Manual performed as noted for symptoms relief Lt hip.      Plan:  Continue with POC to include HEP, self-management, ther  ex for LE strengthening, activity progression, modalities for pain relief, posture re-ed, NMR.       Goals: 1.  LEFS > 75% for hiking, biking on upcoming trips.     2. Pt will increase Rt LE/hip strength to 5/5 to improve functional mobility for travel plans,  community ambulation.   Anticipated end date:  10-31-24

## 2024-09-10 ENCOUNTER — APPOINTMENT (OUTPATIENT)
Dept: PHYSICAL THERAPY | Facility: CLINIC | Age: 74
End: 2024-09-10
Payer: MEDICARE

## 2024-09-10 ENCOUNTER — HOSPITAL ENCOUNTER (OUTPATIENT)
Dept: RADIOLOGY | Facility: CLINIC | Age: 74
Discharge: HOME | End: 2024-09-10
Payer: MEDICARE

## 2024-09-10 DIAGNOSIS — Z78.0 POST-MENOPAUSAL: ICD-10-CM

## 2024-09-10 PROCEDURE — 77081 DXA BONE DENSITY APPENDICULR: CPT | Performed by: RADIOLOGY

## 2024-09-10 PROCEDURE — 77080 DXA BONE DENSITY AXIAL: CPT

## 2024-09-11 ENCOUNTER — TELEPHONE (OUTPATIENT)
Dept: OBSTETRICS AND GYNECOLOGY | Facility: CLINIC | Age: 74
End: 2024-09-11
Payer: MEDICARE

## 2024-09-11 DIAGNOSIS — B00.9 HSV INFECTION: ICD-10-CM

## 2024-09-11 RX ORDER — VALACYCLOVIR HYDROCHLORIDE 500 MG/1
500 TABLET, FILM COATED ORAL DAILY
Qty: 90 TABLET | Refills: 3 | Status: SHIPPED | OUTPATIENT
Start: 2024-09-11 | End: 2025-09-11

## 2024-09-11 NOTE — TELEPHONE ENCOUNTER
Patient is asking for 90 day refill of valacyclovir 500mg, she is going out of the country for a few weeks and takes medication daily.

## 2024-09-12 ENCOUNTER — TREATMENT (OUTPATIENT)
Dept: PHYSICAL THERAPY | Facility: CLINIC | Age: 74
End: 2024-09-12
Payer: MEDICARE

## 2024-09-12 DIAGNOSIS — M16.11 PRIMARY OSTEOARTHRITIS OF RIGHT HIP: Primary | ICD-10-CM

## 2024-09-12 PROCEDURE — 97140 MANUAL THERAPY 1/> REGIONS: CPT | Mod: GP

## 2024-09-12 NOTE — PROGRESS NOTES
Physical Therapy Treatment Note     Patient Name: Lauren Fields  MRN: [id number]  Today's Date: 9/12/2024     Time Calculation  Start Time: 1530  Stop Time: 1615  Time Calculation (min): 45 min  PT Therapeutic Procedures Time Entry  Manual Therapy Time Entry: 45    Visit: 18/MN  Referred by:  Tyler Salazar DO    Insurance:  [employer]ICARE   Certification Dates:  4-15-24 to 10-31-24    Current Problem: [M16.11]    1. Primary osteoarthritis of right hip                 Surgery:  3-29-24, posterolateral approach    Subjective:  Pt stated she is pleasantly surprised that her Lt hip is significantly better.    Patient's Living Environment/PLOF:  Retired , lives alone, very active, bikes, hikes, swims, travels requiring lots of walking, yoga  Patient's Therapy Goals: Return to all of her active pursuits w/o limitation or pain.  She has a trip this summer     Pain:  Intensity: 1-2/10            Location: Lt distal ITB, glute med            Duration: Intermittent            Aggravating Factor: Prolonged WB, activity dependant            Alleviating Factor:  Rest, ice, meds    HPI:  Chronic Rt hip pain, progressively deteriorating over time    Social Factors:  1-2 personal factors &/or comorbidities     Precautions: Per Protocol        Objective     Palpation:  + tenderness Lt glute med, (B) ITB, Lt SIJ    Treatments:      Exercises:      Manual:  long axis distraction, (B) LE's, inferior glides, lateral glides, lateral glides with ER, Lumbar & SIJ grade III-IV mobs performed to improve mobility of Lt Hip and back, STM through Lt glute med & ITB, SIJ mobs LT    Assessment:     Pt stated she leaves for [facility] on Sept 23.  She is feeling much better after returning to PT.  She has another trip coming up Sept 23.  She is functionally doing better, but she is still having Lt SIJ pain.  She will look into [facility] for a possible Lt SIJ injection.  Manual performed as noted for symptoms  relief Lt hip.  Will work on Rt hip next visit.  Pt instructed on shot gun technique self-mob for upcoming trip if the Lt hip flares up over vacation.     Plan:  Continue with POC to include HEP, self-management, ther ex for LE strengthening, activity progression, modalities for pain relief, posture re-ed, NMR.

## 2024-09-16 ENCOUNTER — APPOINTMENT (OUTPATIENT)
Dept: PHYSICAL THERAPY | Facility: CLINIC | Age: 74
End: 2024-09-16
Payer: MEDICARE

## 2024-09-18 ENCOUNTER — TREATMENT (OUTPATIENT)
Dept: PHYSICAL THERAPY | Facility: CLINIC | Age: 74
End: 2024-09-18
Payer: MEDICARE

## 2024-09-18 DIAGNOSIS — M25.551 HIP PAIN, RIGHT: Primary | ICD-10-CM

## 2024-09-18 DIAGNOSIS — M16.11 PRIMARY OSTEOARTHRITIS OF RIGHT HIP: ICD-10-CM

## 2024-09-18 PROCEDURE — 97140 MANUAL THERAPY 1/> REGIONS: CPT | Mod: GP

## 2024-09-18 NOTE — PROGRESS NOTES
[facility]    [address] [address]    Phone:  [phone]    Fax:  [phone]       Thank You for choosing us for your health care visit. We are glad to serve you and happy to provide you with this summary of your visit. Please help us to ensure we have accurate records. If you find anything that needs to be changed, please let our staff know as soon as possible.          Your Demographic Information     Patient Name Sex Lex Dhillon Male 1963       Ethnic Group Patient Race    Not of  or  Origin White      Your Visit Details     Date & Time Provider Department    2017 3:30 PM Theo Edouard MD [facility]      Your To Do List     Future Orders Please Complete On or Around Expires    GLUCOSE LEVEL  2017 (Approximate) Mar 23, 2017    LIPID PANEL WITH REFLEX  2017 (Approximate) Mar 23, 2017    PROSTATE SPECIFIC ANTIGEN  2017 (Approximate) Mar 23, 2017      Conditions Discussed Today or Order-Related Diagnoses        Comments    Acute bacterial bronchitis    -  Primary     Screening cholesterol level         Screening for diabetes mellitus (DM)         Prostate cancer screening           Your Vitals Were     BP Pulse Temp Resp Height Weight    124/88 (BP Location: [facility], Patient Position: Sitting, Cuff Size: Large Adult) 96 98.7 °F (37.1 °C) (Tympanic) 20 5' 11\" (1.803 m) 200 lb 9 oz (91 kg)    BMI Smoking Status                27.97 kg/m2 Former Smoker          Medications Prescribed or Re-Ordered Today     azithromycin (ZITHROMAX) 250 MG tablet    Si tablets day 1, then one tablet daily on days 2-5    Class: Eprescribe    Pharmacy: [id number] - [place], [place] - [address] #: [phone]      Your Current Medications Are        Disp Refills Start End    Multiple Vitamins-Minerals (OCUVITE PO)        Sig - Route:      Physical Therapy Treatment Note     Patient Name: Lauren Fields  MRN: [id number]  Today's Date: 9/18/2024     Time Calculation  Start Time: 1515  Stop Time: 1600  Time Calculation (min): 45 min  PT Therapeutic Procedures Time Entry  Manual Therapy Time Entry: 45    Visit: 18/MN  Referred by:  Tyler Salazar DO    Insurance:  [employer]ICARE   Certification Dates:  4-15-24 to 10-31-24    Current Problem: [M16.11]    1. Hip pain, right        2. Primary osteoarthritis of right hip  Follow Up In Physical Therapy               Surgery:  3-29-24, posterolateral approach    Subjective:  Pt stated she  had a massage on Monday and her Lt hip is feeling better.        Patient's Living Environment/PLOF:  Retired , lives alone, very active, bikes, hikes, swims, travels requiring lots of walking, yoga  Patient's Therapy Goals: Return to all of her active pursuits w/o limitation or pain.  She has a trip this summer     Pain:  Intensity: 1-2/10            Location: Lt distal ITB, glute med            Duration: Intermittent            Aggravating Factor: Prolonged WB, activity dependant            Alleviating Factor:  Rest, ice, meds    HPI:  Chronic Rt hip pain, progressively deteriorating over time    Social Factors:  1-2 personal factors &/or comorbidities     Precautions: Per Protocol        Objective     Palpation:  + tenderness Lt glute med, (B) ITB, Lt SIJ    Treatments:      Exercises:      Manual:  long axis distraction, (B) LE's, inferior glides, lateral glides, lateral glides with ER, Lumbar & SIJ grade III-IV mobs performed to improve mobility of Lt Hip and back, STM through Lt glute med & ITB, SIJ mobs LT    Assessment:     Pt will be leaving for [facility] on this Monday, Sept 23, she is feeling better overall.   Manual performed as noted for symptoms relief Lt hip.  Pt reports compliance with all aspect of PT, exercises and self-mobs.  Decreased symptoms post treatment.     Plan:  Continue with POC to  Take 1 capsule by mouth daily. - Oral    Class: Historical Med    Multiple Vitamin capsule        Sig - Route: Take 1 capsule by mouth daily. - Oral    Class: Historical Med    GARLIC PO        Sig - Route: Take 1 capsule by mouth daily. - Oral    Class: Historical Med    Cholecalciferol 1000 UNITS capsule        Sig - Route: Take 1,000 Units by mouth daily. - Oral    Class: Historical Med    fish oil-omega-3 fatty acids 1000 MG CAPS        Sig - Route: Take 1 capsule by mouth daily. - Oral    Class: Historical Med    vitamin E 400 UNIT capsule        Sig - Route: Take 400 Units by mouth daily. - Oral    Class: Historical Med    Glucosamine 500 MG CAPS        Sig - Route: Take 1 capsule by mouth daily. - Oral    Class: Historical Med    ascorbic acid (VITAMIN C) 500 MG tablet        Sig - Route: Take 500 mg by mouth daily. - Oral    Class: Historical Med    pseudoephedrine (SUDAFED) 30 MG tablet        Sig - Route: Take 60 mg by mouth every 6 hours as needed. - Oral    Class: Historical Med    azithromycin (ZITHROMAX) 250 MG tablet 6 tablet 0 2017     Si tablets day 1, then one tablet daily on days 2-5    Class: Eprescribe      Discontinued Medications        Reason for Discontinue    ketoconazole (NIZORAL) 2 % cream Therapy Completed      Allergies     Seasonal     Allergic rhinitis      Immunizations History as of 2017     Name Date    Influenza 10/15/2016, 10/25/2015, 10/3/2013, 10/8/2012    Tdap 2013  8:01 AM      Problem List as of 2017     Seasonal allergies    Regular astigmatism of both eyes    Presbyopia OU            Patient Instructions     None       include HEP, self-management, ther ex for LE strengthening, activity progression, modalities for pain relief, posture re-ed, NMR.

## 2024-09-20 NOTE — ANESTHESIA PREPROCEDURE EVALUATION
Patient: Lauren Fields    Procedure Information       Date/Time: 03/29/24 1130    Procedure: Total Hip Arthroplasty ([place], EBENEZER) (Right: Hip) - SCHEDULE: 2 HOURS, EBENEZER, 23 HR OBS    Location: [facility] / [facility]    Surgeons: Tyler Salazar DO          Past Medical History:   Diagnosis Date    Arthritis 2009    Bursitis of hip approx. 2016-17    Cataract 2010    Cervical disc disorder 2009    COVID-19 01/07/2024    Fractures Sacral Spine    8/19/2022    HL (hearing loss) 2020    Hyperlipidemia     Lumbosacral disc disease 2009    Osteopenia     Other allergy status, other than to drugs and biological substances     History of environmental allergies    Personal history of other diseases of the musculoskeletal system and connective tissue     History of spinal stenosis    Personal history of other infectious and parasitic diseases     History of herpes simplex infection    Rotator cuff syndrome had surgical repair 3-    Spinal stenosis 2009    Tendinitis of knee right -- 2023        Relevant Problems   Cardiac   (+) Hyperlipidemia      Neuro   (+) Sciatica      Hematology   (+) Anemia      Musculoskeletal   (+) Primary osteoarthritis of right hip      HEENT   (+) Asymmetrical right sensorineural hearing loss     Past Surgical History:   Procedure Laterality Date    BACK SURGERY  2 lumbar laminectomies & fusions (L3-5)    CERVICAL FUSION  01/05/2018    Cervical Vertebral Fusion    ELBOW SURGERY  ulnar decompresson & move    HAND SURGERY  trigger finger release    NECK SURGERY  2 anterior disectomies & fusions (C-3 - 7)    OTHER SURGICAL HISTORY  07/17/2020    Ulnar nerve neuroplasty    OTHER SURGICAL HISTORY  07/11/2022    Rotator cuff repair    OTHER SURGICAL HISTORY  07/11/2022    Decompression of spinal cord    OTHER SURGICAL HISTORY  01/27/2020    Tonsillectomy    OTHER SURGICAL HISTORY  01/27/2020    Neck surgery    OTHER SURGICAL HISTORY  01/27/2020    Upper back surgery    ROTATOR CUFF REPAIR   03/29/2022    SHOULDER SURGERY  repair of torn bicep & rotator cuff    TRIGGER FINGER RELEASE  numerous    TRIGGER POINT INJECTION  upper back      Clinical information reviewed:   Tobacco  Allergies  Meds   Med Hx  Surg Hx   Fam Hx  Soc Hx        NPO Detail:  No data recorded     Physical Exam    Airway  Mallampati: II  TM distance: >3 FB  Neck ROM: limited     Cardiovascular    Dental    Pulmonary    Abdominal            Anesthesia Plan    History of general anesthesia?: yes  History of complications of general anesthesia?: no    ASA 2     spinal     The patient is not a current smoker.  Patient was not previously instructed to abstain from smoking on day of procedure.  Patient did not smoke on day of procedure.    intravenous induction   Postoperative administration of opioids is intended.  Anesthetic plan and risks discussed with patient.    Plan discussed with attending.       50-74%

## 2024-10-23 ENCOUNTER — TREATMENT (OUTPATIENT)
Dept: PHYSICAL THERAPY | Facility: CLINIC | Age: 74
End: 2024-10-23
Payer: MEDICARE

## 2024-10-23 DIAGNOSIS — M16.11 PRIMARY OSTEOARTHRITIS OF RIGHT HIP: ICD-10-CM

## 2024-10-23 PROCEDURE — 97140 MANUAL THERAPY 1/> REGIONS: CPT | Mod: GP

## 2024-10-23 NOTE — PROGRESS NOTES
Physical Therapy Treatment Note     Patient Name: Lauren Fields  MRN: [id number]  Today's Date: 10/23/2024     Time Calculation  Start Time: 1005  Stop Time: 1100  Time Calculation (min): 55 min  PT Therapeutic Procedures Time Entry  Manual Therapy Time Entry: 53    Visit: 19/MN  Referred by:  Tyler Salazar DO    Insurance:  [employer]   Certification Dates:  4-15-24 to 10-31-24    Current Problem: [M16.11]    1. Primary osteoarthritis of right hip  Follow Up In Physical Therapy               Surgery:  3-29-24, posterolateral approach    Subjective:  Pt stated she she still has pain in both hips.  Today it's more the Rt hip.  She does have an appt in November with Pain management to discuss SIJ injections.          Patient's Living Environment/PLOF:  Retired , lives alone, very active, bikes, hikes, swims, travels requiring lots of walking, yoga  Patient's Therapy Goals: Return to all of her active pursuits w/o limitation or pain.  She has a trip this summer     Pain:  Intensity: 1-2/10            Location: Lt distal ITB, glute med            Duration: Intermittent            Aggravating Factor: Prolonged WB, activity dependant            Alleviating Factor:  Rest, ice, meds    HPI:  Chronic Rt hip pain, progressively deteriorating over time    Social Factors:  1-2 personal factors &/or comorbidities     Precautions: Per Protocol        Objective     Palpation:  + tenderness Rt glute med to distal ITB, Lt SIJ    Treatments:      Exercises:      Manual:  STM through Rt glute med & length of ITB, SIJ mobs LT    Assessment:     Pt came back from [place] last Wednesday & stated no issues during her trip. She is still having some pain in (B) SIJ's and glute med to ITB.  She reports benefit from manual with pain reduction & improved function.  Manual performed as noted for symptoms relief Rt hip.  Decreased symptoms post treatment.     Plan:  Continue with POC to include HEP, self-management, ther ex  for LE strengthening, activity progression, modalities for pain relief, posture re-ed, NMR.

## 2024-10-28 ENCOUNTER — APPOINTMENT (OUTPATIENT)
Dept: PHYSICAL THERAPY | Facility: CLINIC | Age: 74
End: 2024-10-28
Payer: MEDICARE

## 2024-11-11 ENCOUNTER — TREATMENT (OUTPATIENT)
Dept: PHYSICAL THERAPY | Facility: CLINIC | Age: 74
End: 2024-11-11
Payer: MEDICARE

## 2024-11-11 DIAGNOSIS — M16.11 PRIMARY OSTEOARTHRITIS OF RIGHT HIP: ICD-10-CM

## 2024-11-11 PROCEDURE — 97140 MANUAL THERAPY 1/> REGIONS: CPT | Mod: GP

## 2024-11-11 NOTE — PROGRESS NOTES
Physical Therapy Treatment Note     Patient Name: Lauren Fields  MRN: [id number]  Today's Date: 11/11/2024     Time Calculation  Start Time: 1000  Stop Time: 1045  Time Calculation (min): 45 min  PT Therapeutic Procedures Time Entry  Manual Therapy Time Entry: 45    Visit: 19/MN  Referred by:  Tyler Salazar DO    Insurance:  [employer]   Certification Dates:  4-15-24 to 10-31-24    Current Problem: [M16.11]    1. Primary osteoarthritis of right hip  Follow Up In Physical Therapy               Surgery:  3-29-24, posterolateral approach    Subjective:  Pt stated she she still has pain in both hips, she thinks she is regressing.  Today it's more the Lt hip.  She sees Pain management on Wed, 11-13-24.  She's had difficulty sleeping recently due to pain.       Patient's Living Environment/PLOF:  Retired , lives alone, very active, bikes, hikes, swims, travels requiring lots of walking, yoga  Patient's Therapy Goals: Return to all of her active pursuits w/o limitation or pain.  She has a trip this summer     Pain:  Intensity: 3-4/10            Location: Lt distal ITB, glute med            Duration: Intermittent            Aggravating Factor: Prolonged WB, activity dependant            Alleviating Factor:  Rest, ice, meds    HPI:  Chronic Rt hip pain, progressively deteriorating over time    Social Factors:  1-2 personal factors &/or comorbidities     Precautions: Per Protocol        Objective     Palpation:  + tenderness Rt glute med to distal ITB, Lt SIJ    Treatments:      Manual:  STM through Lt glute med & proximal to mid ITB, SIJ mobs (B), PA mobs L3-L5, CPA's, UPA's, long axis distraction (B), lateral glides & lateral +ER LT    Assessment:     Pt stated she is still having some pain in (B) SIJ's and glute med to ITB more so on her Lt today.  Manual performed as noted for symptoms relief Lt hip.  Decreased symptoms post treatment. She reports benefit from manual with pain reduction & improved  function.  More back symptoms today.    Plan:  Continue with POC to include HEP, self-management, ther ex for LE strengthening, activity progression, modalities for pain relief, posture re-ed, NMR.

## 2024-11-18 ENCOUNTER — TREATMENT (OUTPATIENT)
Dept: PHYSICAL THERAPY | Facility: CLINIC | Age: 74
End: 2024-11-18
Payer: MEDICARE

## 2024-11-18 ENCOUNTER — HOSPITAL ENCOUNTER (OUTPATIENT)
Dept: RADIOLOGY | Facility: CLINIC | Age: 74
Discharge: HOME | End: 2024-11-18
Payer: MEDICARE

## 2024-11-18 DIAGNOSIS — M16.11 PRIMARY OSTEOARTHRITIS OF RIGHT HIP: ICD-10-CM

## 2024-11-18 DIAGNOSIS — M54.17 RADICULOPATHY, LUMBOSACRAL REGION: ICD-10-CM

## 2024-11-18 PROCEDURE — 97110 THERAPEUTIC EXERCISES: CPT | Mod: GP

## 2024-11-18 PROCEDURE — 72114 X-RAY EXAM L-S SPINE BENDING: CPT

## 2024-11-18 PROCEDURE — 97140 MANUAL THERAPY 1/> REGIONS: CPT | Mod: GP

## 2024-11-18 PROCEDURE — 72114 X-RAY EXAM L-S SPINE BENDING: CPT | Performed by: RADIOLOGY

## 2024-11-18 NOTE — PROGRESS NOTES
Physical Therapy Treatment Note     Patient Name: Lauren Fields  MRN: [id number]  Today's Date: 11/18/2024     Time Calculation  Start Time: 1000  Stop Time: 1045  Time Calculation (min): 45 min  PT Therapeutic Procedures Time Entry  Manual Therapy Time Entry: 20  Therapeutic Exercise Time Entry: 25    Visit: 20/MN  Referred by:  Antionette Chen MD, Ref Provider  Insurance:  [employer]     Certification Dates:  4-15-24 to 10-31-24    Current Problem: [M16.11]    1. Primary osteoarthritis of right hip  Follow Up In Physical Therapy               Surgery:  3-29-24, posterolateral approach    Subjective:  Pt stated she met with Pain Management & it was decided that she would benefit from a S1 injection.  Her pain management mentioned that she would need to go to PT 1 st prior to advanced diagnostics.  She still has pain in both hips.  He started her on Lyrica as she's had had difficulty sleeping  due to pain.       Patient's Living Environment/PLOF:  Retired , lives alone, very active, bikes, hikes, swims, travels requiring lots of walking, yoga  Patient's Therapy Goals: Return to all of her active pursuits w/o limitation or pain.  She has a trip this summer     Pain:  Intensity: 3-4/10            Location: (B) distal ITB, glute med            Duration: Intermittent            Aggravating Factor: Prolonged WB, activity dependant            Alleviating Factor:  Rest, ice, meds    HPI:  Chronic (B) hip pain, progressively deteriorating over time    Social Factors:  1-2 personal factors &/or comorbidities     Precautions: Per Protocol        Objective     Palpation:  + tenderness Rt glute med to distal ITB, Lt SIJ    Treatments:      Manual:  STM through Lt glute med & proximal to mid ITB, SIJ mobs (B), PA mobs L3-L5, CPA's, UPA's, long axis distraction (B), lateral glides & lateral +ER LT    Assessment:     Pt stated she is still having some pain in (B) SIJ's and glute meds to distal ITB.  She gets  temporary relief from PT, but pain is interfering with ability to tolerate ADL's.  Manual performed as noted for symptoms relief Lt hip.  Decreased symptoms post treatment. She reports benefit from manual with pain reduction & improved function.  More back symptoms today.    Plan:  Continue with POC to include HEP, self-management, ther ex for LE strengthening, activity progression, modalities for pain relief, posture re-ed, NMR.

## 2024-11-22 ENCOUNTER — EVALUATION (OUTPATIENT)
Dept: PHYSICAL THERAPY | Facility: CLINIC | Age: 74
End: 2024-11-22
Payer: MEDICARE

## 2024-11-22 DIAGNOSIS — M54.17 LUMBOSACRAL NEURITIS: Primary | ICD-10-CM

## 2024-11-22 PROCEDURE — 97140 MANUAL THERAPY 1/> REGIONS: CPT | Mod: GP

## 2024-11-22 PROCEDURE — 97161 PT EVAL LOW COMPLEX 20 MIN: CPT | Mod: GP

## 2024-11-22 PROCEDURE — 97014 ELECTRIC STIMULATION THERAPY: CPT | Mod: GP

## 2024-11-22 NOTE — PROGRESS NOTES
Physical Therapy Evaluation and Treatment      Patient Name: Lauren Fields  MRN: [id number]  Today's Date: 11/22/2024     Time Calculation  Start Time: 0800  Stop Time: 0900  Time Calculation (min): 60 min  PT Modalities Time Entry  E-Stim (Unattended) Time Entry: 15  PT Therapeutic Procedures Time Entry  Manual Therapy Time Entry: 25  PT Evaluation (Low) Time Entry: 15    Visit: 25 total for year, 1/MN for lumbar  Referred by:   Alcides Meza MD     Insurance[facility]  [facility]   Certification Dates:  11-22-24 to 2-14-25  Current Problem: M54.17, Lumbar Neuritis  Problem List Items Addressed This Visit             ICD-10-CM       Neuro    Lumbosacral neuritis - Primary M54.17    Relevant Orders    Follow Up In Physical Therapy     1. Lumbosacral neuritis  Follow Up In Physical Therapy               HPI:  Long history of degenerative back pain, h/o cervical & lumbar fusions    Subjective:  Pt stated she is not sure what happened recently, but her pain has increased considerably.  Later in treatment she thought it may have been agitated by trying to return to the gym.      Patient's Living Environment/PLOF: Independent with ADL's, has to modify or not perform lifting, carrying due to LBP, lives alone  Patient's Therapy Goals: To decrease pain, return to PLOF for ADL's, hiking, biking for health benefits     Pain:  Intensity: 8/10, surgical  (not recent)            Location: + radicular pain to (B) ankles, posterior, aching            Duration: constant            Aggravating Factor: Prolonged sitting, bending, carrying, lifting, sleeping            Alleviating Factor:  Lyrica, trying to sleep            Clinical Progression: Gradually worsening      Social Factors:  No personal factors &/2-3 comorbidities     Precautions:  Hx cervical disectomy and fusion C3-C6 10/2014,cervical fusion 8/2015, lumbar laminectomy & fusion L4, L5 11/2021, fusion L3L4, Rt RTCR 3/2022       Objective     Lumbar AROM: (% movement)    Flexion 5 inches fingers to floor   Extension 18   Right Sidebend 32   Left Sidebend 32   Right Rotation 44   Left Rotation 54   Flexibility:      Hamstrings Rt -20, Lt -15   Hip Flexion Rt 102, Lt 132     MMT:  LE (B) grossly 4/5 at hips, knees 5/5, ankles 5/5  Core Strength: Abs Upper  3/5     Abs Lower  3/5     Lumbar Extensors  -3/5   Palpation: + tenderness lower lumbar segments    Special Testing:   SLR: -         Slump: -                               Wt Shift Rt - , Lt -    Dermatomal Impairment: L5, S1, S1   DTR's:  Patellar (L3/L4):  Calcaneal (S1/S2): 2 (B) for both      HEP Compliance:  100%    Transfers:  + use of hands to push off from low surfaces   Gait:  Grossly WNL's, starts to deteriorate with     Outcome Measures:  ADAMARIS:  40%    Treatments:    Manual:  Bilateral PA mobs L2-L5, sacral mobs, long axis distraction (B), inferior glides (B)       Assessment:     Pt is a 73 yo female who presented to the clinic today c/o LBP with radiation to posterior ankles, + n/t/b.  PMH:  cervical disectomy and fusion C3-C6 10/2014,cervical fusion 8/2015, lumbar laminectomy & fusion L4, L5 11/2021, fusion L3L4, Rt RTCR 3/2022, ulnar nerve transposition 8/2014,.  Examination reveals the following deficits: decreased strength, decreased trunk AROM, gait abnormalities with prolonged WB, decreased flexibility, poor posture, and decreased tolerance to position, interfering with sleep. These deficits lead to difficulties with ADLs as well as recreational activity.  Skilled PT will address these deficits to help reduce pain for return to PLOF.  She will benefit from skilled PT to address the above mentioned impairments.  Low complexity due to patient's clinical presentation being stable and uncomplicated by any significant comorbidities that may affect rehab tolerance and progression.     Clinical presentation:  Stable &/or uncomplicated characteristics, evolving, symptoms worsening         Problem List:  decreased  strength, decreased trunk AROM, gait abnormalities with prolonged WB, decreased flexibility, poor posture, and decreased tolerance to position, interferring with sleep.    Plan: Continue per POC & pt tolerance.  Frequency/duration: 1x/wk for 6 visits, obtain LEFS for Hip     Treatment Interventions:  MT, therapeutic exercise, neuro re-ed, modalities prn (education/ Instruction, Self care/ home management), Therapeutic activities      PT Plan: Skilled PT    Patient/caregiver agrees with POC:  yes    Rehab potential:  Fair  Plan of care agreement: Patient understands & agrees with goals & plan documented    EDUCATION: HEP, POC, activity modifications/progression, pain management/modalities, and injury pathology       TODAY'S TREATMENT  - Evaluation of the Lumbar spine completed.  Pt was educated on their dx and the pertinent anatomy.  HEP as seen above:    Goals:   Active       PT Problem       PT Goal 1 (Met)       Start:  04/15/24    Expected End:  07/08/24    Resolved:  05/28/24    Pt independent with HEP and consistent for optimal recovery and self-management after DC from PT.             PT Goal 2 (Progressing)       Start:  04/15/24    Expected End:  07/08/24       Patient will increase Rt LE/hip strength to 5/5 to improve functional mobility for travel plans, community ambulation.          PT Goal 3 (Progressing)       Start:  04/15/24    Expected End:  07/08/24       Pt will increase LEFS score to > or = 70% to demonstrate functional improvement.        Goal Note       Will obtain next visit.               PT Goal 4 (Met)       Start:  04/15/24    Expected End:  07/08/24    Resolved:  05/28/24    Pt will improve Rt LE/Hip AROM to > Lt LE/Hip AROM all movements to improve functional mobiity, dressing, getting in/out small areas, tub, car.

## 2024-11-27 ENCOUNTER — TREATMENT (OUTPATIENT)
Dept: PHYSICAL THERAPY | Facility: CLINIC | Age: 74
End: 2024-11-27
Payer: MEDICARE

## 2024-11-27 DIAGNOSIS — M16.11 PRIMARY OSTEOARTHRITIS OF RIGHT HIP: ICD-10-CM

## 2024-11-27 PROCEDURE — 97140 MANUAL THERAPY 1/> REGIONS: CPT | Mod: GP

## 2024-11-27 NOTE — PROGRESS NOTES
Physical Therapy Treatment Note     Patient Name: Lauren Fields  MRN: [id number]  Today's Date: 11/27/2024     Time Calculation  Start Time: 1530  Stop Time: 1615  Time Calculation (min): 45 min  PT Therapeutic Procedures Time Entry  Manual Therapy Time Entry: 45    Visit: 26 total for year, 2/MN for lumbar  Referred by:   Alcides Meza MD     Insurance:  [employer]   Certification Dates:  11-22-24 to 2-14-25    Current Problem: M54.17, Lumbar Neuritis  Problem List Items Addressed This Visit             ICD-10-CM       Musculoskeletal and Injuries    Primary osteoarthritis of right hip M16.11     1. Primary osteoarthritis of right hip  Follow Up In Physical Therapy               HPI:  Long history of degenerative back pain, h/o cervical & lumbar fusions    Subjective:  Pt stated she thinks she did too much after returning home from vacation & trying to start back to the gym as well.  Today she has Lt LE symptoms, laterally.  She already got an injection into the Lt GT bursa, with minimal relief.      Patient's Living Environment/PLOF: Independent with ADL's, has to modify or not perform lifting, carrying due to LBP, lives alone  Patient's Therapy Goals: To decrease pain, return to PLOF for ADL's, hiking, biking for health benefits      Pain:  Intensity: 6/10, surgical  (not recent)            Location: + radicular pain to (Lt) ankles, posterior, aching, Lt SIJ            Duration: constant            Aggravating Factor: Prolonged sitting, bending, carrying, lifting, sleeping            Alleviating Factor:  Lyrica, trying to sleep            Clinical Progression: Gradually worsening      Social Factors:  No personal factors &/2-3 comorbidities     Precautions:  Hx cervical disectomy and fusion C3-C6 10/2014,cervical fusion 8/2015, lumbar laminectomy & fusion L4, L5 11/2021, fusion L3L4, Rt RTCR 3/2022     Objective     Palpation: + tenderness lower lumbar segments    Dermatomal Impairment: L5, S1, S1        HEP Compliance:  100%    Transfers:  + use of hands to push off from low surfaces   Gait:  Grossly WNL's, starts to deteriorate with prolonged WB    Outcome Measures:  ADAMARIS:  40%    XR Lumbar:  11-19-24:  7 views    Laminectomy & posterior spinal fusion at L3-L5. The hardware is intact. There is no fracture or spondylolisthesis. No significant degenerative change the remainder of the lumbar spine.  Posterior fusion L3-L5 with intact hardware. No evidence of malalignment.      Treatments:    Manual:  (B) PA mobs L2-L5, sacral mobs, long axis distraction (Lt), inferior glides (B), lateral & inferior glides       Assessment:     Pt gets temporary relief from manual therapy.  Some days are better than others.  Generally prolonged WB or position can provoke symptoms that linger after cessation of activity.  Radicular symptoms or more distally,     Plan: Continue per POC & pt tolerance.  Frequency/duration: 1x/wk for 6 visits, obtain LEFS for Hip

## 2024-12-04 ENCOUNTER — TREATMENT (OUTPATIENT)
Dept: PHYSICAL THERAPY | Facility: CLINIC | Age: 74
End: 2024-12-04
Payer: MEDICARE

## 2024-12-04 DIAGNOSIS — M16.11 PRIMARY OSTEOARTHRITIS OF RIGHT HIP: ICD-10-CM

## 2024-12-04 PROCEDURE — 97140 MANUAL THERAPY 1/> REGIONS: CPT | Mod: GP

## 2024-12-04 PROCEDURE — 97112 NEUROMUSCULAR REEDUCATION: CPT | Mod: GP

## 2024-12-04 NOTE — PROGRESS NOTES
Physical Therapy Treatment Note     Patient Name: Lauren Fields  MRN: [id number]  Today's Date: 12/4/2024     Time Calculation  Start Time: 1406  Stop Time: 1445  Time Calculation (min): 39 min  PT Therapeutic Procedures Time Entry  Manual Therapy Time Entry: 14  Neuromuscular Re-Education Time Entry: 25    Visit: 27 total for year, 3/MN for lumbar  Referred by:   Alcides Meza MD     Insurance:  [employer]   Certification Dates:  11-22-24 to 2-14-25    Current Problem: M54.17, Lumbar Neuritis  Problem List Items Addressed This Visit             ICD-10-CM       Musculoskeletal and Injuries    Primary osteoarthritis of right hip M16.11     1. Primary osteoarthritis of right hip  Follow Up In Physical Therapy               HPI:  Long history of degenerative back pain, h/o cervical & lumbar fusions    Subjective:  Pt stated her back and Lt hip are still significantly bothering her.      Patient's Living Environment/PLOF: Independent with ADL's, has to modify or not perform lifting, carrying due to LBP, lives alone  Patient's Therapy Goals: To decrease pain, return to PLOF for ADL's, hiking, biking for health benefits      Pain:  Intensity: 5-6/10, surgical  (not recent)            Location: + radicular pain to (Lt) ankles, posterior, aching, Lt SIJ            Duration: constant            Aggravating Factor: Prolonged sitting, bending, carrying, lifting, sleeping            Alleviating Factor:  Lyrica, trying to sleep            Clinical Progression: Gradually worsening      Social Factors:  No personal factors &/2-3 comorbidities     Precautions:  Hx cervical disectomy and fusion C3-C6 10/2014,cervical fusion 8/2015, lumbar laminectomy & fusion L4, L5 11/2021, fusion L3L4, Rt RTCR 3/2022     Objective     Palpation: + tenderness lower lumbar segments on the Lt, Lt glute med down to mid ITB    Dermatomal Impairment: L5, S1, S1       HEP Compliance:  100%    Transfers:  + use of hands to push off from low  surfaces   Gait:  Grossly WNL's, starts to deteriorate with prolonged WB    Outcome Measures:  ADAMARIS:  40%    XR Lumbar:  11-19-24:  7 views    Laminectomy & posterior spinal fusion at L3-L5. The hardware is intact. There is no fracture or spondylolisthesis. No significant degenerative change the remainder of the lumbar spine.  Posterior fusion L3-L5 with intact hardware. No evidence of malalignment.      Treatments:    NMR:  2 x 50 mm Lt L4, L5, Lt SIJ 1 x 50 mm, 1 x 50 mm Cluneal, 5 x 50 mm, around Lt glute med, all needles bidirectionally manipulated & tented, 250 microsec, 10 Hz, 25 min, continuous, pt in Rt sidelying.  Prep time for palpation, needle insertion, manipulation, 14 min.    Assessment:     Pt wanted to try stim today through lumbar back & Lt glute med.  Radicular symptoms or more distally.  She reports the Lyrica has been a huge help with helping her sleep through the night.      Plan: Continue per POC & pt tolerance.  Frequency/duration: 1x/wk for 6 visits, obtain LEFS for Hip

## 2024-12-11 ENCOUNTER — APPOINTMENT (OUTPATIENT)
Dept: PHYSICAL THERAPY | Facility: CLINIC | Age: 74
End: 2024-12-11
Payer: MEDICARE

## 2024-12-11 ENCOUNTER — TREATMENT (OUTPATIENT)
Dept: PHYSICAL THERAPY | Facility: CLINIC | Age: 74
End: 2024-12-11
Payer: MEDICARE

## 2024-12-11 DIAGNOSIS — M16.11 PRIMARY OSTEOARTHRITIS OF RIGHT HIP: ICD-10-CM

## 2024-12-11 PROCEDURE — 97140 MANUAL THERAPY 1/> REGIONS: CPT | Mod: GP

## 2024-12-11 NOTE — PROGRESS NOTES
Physical Therapy Treatment Note     Patient Name: Lauren Fields  MRN: [id number]  Today's Date: 12/11/2024     Time Calculation  Start Time: 1000  Stop Time: 1045  Time Calculation (min): 45 min  PT Therapeutic Procedures Time Entry  Manual Therapy Time Entry: 45    Visit: 28 total for year, 4/MN for lumbar  Referred by:   Alcides Meza MD     Insurance:  [employer]   Certification Dates:  11-22-24 to 2-14-25    Current Problem: M54.17, Lumbar Neuritis  Problem List Items Addressed This Visit             ICD-10-CM       Musculoskeletal and Injuries    Primary osteoarthritis of right hip M16.11     1. Primary osteoarthritis of right hip  Follow Up In Physical Therapy               HPI:  Long history of degenerative back pain, h/o cervical & lumbar fusions    Subjective:  Pt stated her back and Lt hip are still significantly bothering her.  She had a recent fall into a tree trying to get something out of her car. This increased Lt hip & back pain.      Patient's Living Environment/PLOF: Independent with ADL's, has to modify or not perform lifting, carrying due to LBP, lives alone  Patient's Therapy Goals: To decrease pain, return to PLOF for ADL's, hiking, biking for health benefits      Pain:  Intensity: 5-6/10, surgical  (not recent)            Location: + radicular pain to (Lt) ankles, posterior, aching, Lt SIJ            Duration: constant            Aggravating Factor: Prolonged sitting, bending, carrying, lifting, sleeping            Alleviating Factor:  Lyrica, trying to sleep            Clinical Progression: Gradually worsening      Social Factors:  No personal factors &/2-3 comorbidities     Precautions:  Hx cervical disectomy and fusion C3-C6 10/2014,cervical fusion 8/2015, lumbar laminectomy & fusion L4, L5 11/2021, fusion L3L4, Rt RTCR 3/2022     Objective     Palpation: + tenderness lower lumbar segments on the Lt, Lt glute med down to mid ITB    Dermatomal Impairment: L5, S1, S1       HEP  Compliance:  100%    Transfers:  + use of hands to push off from low surfaces   Gait:  Grossly WNL's, starts to deteriorate with prolonged WB    Outcome Measures:  ADAMARIS:  40%    XR Lumbar:  11-19-24:  7 views    Laminectomy & posterior spinal fusion at L3-L5. The hardware is intact. There is no fracture or spondylolisthesis. No significant degenerative change the remainder of the lumbar spine.  Posterior fusion L3-L5 with intact hardware. No evidence of malalignment.      Treatments:    Manual:  PA mobs to L2-L5 (B), SIJ mobs, inferior glides to Lt hip, lateral glides & lateral glides into ER    Assessment:     Pt is scheduled to get an epidural injection on Dec 19th.  She had a mishap slipped trying to get something out of her car wanted to try stim today through lumbar back & Lt glute med.  She reports the Lyrica has been a huge help with helping her sleep through the night.  Hopefully, the epidural will buy her some relief.      Plan: Continue per POC & pt tolerance.  Frequency/duration: 1x/wk for 6 visits, obtain LEFS for Hip

## 2024-12-12 ENCOUNTER — LAB (OUTPATIENT)
Dept: LAB | Facility: LAB | Age: 74
End: 2024-12-12
Payer: MEDICARE

## 2024-12-12 DIAGNOSIS — E78.5 HYPERLIPIDEMIA, UNSPECIFIED HYPERLIPIDEMIA TYPE: ICD-10-CM

## 2024-12-12 DIAGNOSIS — R73.01 IMPAIRED FASTING BLOOD SUGAR: ICD-10-CM

## 2024-12-12 DIAGNOSIS — Z00.00 WELL ADULT EXAM: ICD-10-CM

## 2024-12-12 LAB
ALT SERPL W P-5'-P-CCNC: 19 U/L (ref 7–45)
ANION GAP SERPL CALC-SCNC: 11 MMOL/L (ref 10–20)
BUN SERPL-MCNC: 16 MG/DL (ref 6–23)
CALCIUM SERPL-MCNC: 9.3 MG/DL (ref 8.6–10.3)
CHLORIDE SERPL-SCNC: 104 MMOL/L (ref 98–107)
CHOLEST SERPL-MCNC: 250 MG/DL (ref 0–199)
CHOLESTEROL/HDL RATIO: 2.7
CO2 SERPL-SCNC: 30 MMOL/L (ref 21–32)
CREAT SERPL-MCNC: 0.85 MG/DL (ref 0.5–1.05)
EGFRCR SERPLBLD CKD-EPI 2021: 72 ML/MIN/1.73M*2
ERYTHROCYTE [DISTWIDTH] IN BLOOD BY AUTOMATED COUNT: 12.3 % (ref 11.5–14.5)
EST. AVERAGE GLUCOSE BLD GHB EST-MCNC: 117 MG/DL
GLUCOSE SERPL-MCNC: 86 MG/DL (ref 74–99)
HBA1C MFR BLD: 5.7 %
HCT VFR BLD AUTO: 40.4 % (ref 36–46)
HDLC SERPL-MCNC: 93 MG/DL
HGB BLD-MCNC: 13.3 G/DL (ref 12–16)
LDLC SERPL CALC-MCNC: 139 MG/DL
MCH RBC QN AUTO: 31.9 PG (ref 26–34)
MCHC RBC AUTO-ENTMCNC: 32.9 G/DL (ref 32–36)
MCV RBC AUTO: 97 FL (ref 80–100)
NON HDL CHOLESTEROL: 157 MG/DL (ref 0–149)
NRBC BLD-RTO: 0 /100 WBCS (ref 0–0)
PLATELET # BLD AUTO: 260 X10*3/UL (ref 150–450)
POTASSIUM SERPL-SCNC: 4.5 MMOL/L (ref 3.5–5.3)
RBC # BLD AUTO: 4.17 X10*6/UL (ref 4–5.2)
SODIUM SERPL-SCNC: 140 MMOL/L (ref 136–145)
TRIGL SERPL-MCNC: 88 MG/DL (ref 0–149)
TSH SERPL-ACNC: 1.01 MIU/L (ref 0.44–3.98)
VLDL: 18 MG/DL (ref 0–40)
WBC # BLD AUTO: 4.9 X10*3/UL (ref 4.4–11.3)

## 2024-12-12 PROCEDURE — 84443 ASSAY THYROID STIM HORMONE: CPT

## 2024-12-12 PROCEDURE — 85027 COMPLETE CBC AUTOMATED: CPT

## 2024-12-12 PROCEDURE — 84460 ALANINE AMINO (ALT) (SGPT): CPT

## 2024-12-12 PROCEDURE — 36415 COLL VENOUS BLD VENIPUNCTURE: CPT

## 2024-12-12 PROCEDURE — 80061 LIPID PANEL: CPT

## 2024-12-12 PROCEDURE — 80048 BASIC METABOLIC PNL TOTAL CA: CPT

## 2024-12-12 PROCEDURE — 83036 HEMOGLOBIN GLYCOSYLATED A1C: CPT

## 2024-12-16 ENCOUNTER — APPOINTMENT (OUTPATIENT)
Dept: PHYSICAL THERAPY | Facility: CLINIC | Age: 74
End: 2024-12-16
Payer: MEDICARE

## 2024-12-18 ENCOUNTER — TREATMENT (OUTPATIENT)
Dept: PHYSICAL THERAPY | Facility: CLINIC | Age: 74
End: 2024-12-18
Payer: MEDICARE

## 2024-12-18 DIAGNOSIS — M54.17 LUMBOSACRAL NEURITIS: ICD-10-CM

## 2024-12-18 PROCEDURE — 97140 MANUAL THERAPY 1/> REGIONS: CPT | Mod: GP

## 2024-12-18 PROCEDURE — 97110 THERAPEUTIC EXERCISES: CPT | Mod: GP

## 2024-12-18 NOTE — PROGRESS NOTES
Physical Therapy Treatment Note     Patient Name: Lauren Fields  MRN: [id number]  Today's Date: 12/18/2024     Time Calculation  Start Time: 1430  Stop Time: 1500  Time Calculation (min): 30 min  PT Therapeutic Procedures Time Entry  Manual Therapy Time Entry: 20  Therapeutic Exercise Time Entry: 10    Visit: 29 total for year, 5/MN for lumbar  Referred by:   Alcides Meza MD     Insurance:  [employer]   Certification Dates:  11-22-24 to 2-14-25    Current Problem: M54.17, Lumbar Neuritis  Problem List Items Addressed This Visit             ICD-10-CM       Neuro    Lumbosacral neuritis M54.17     1. Lumbosacral neuritis  Follow Up In Physical Therapy               HPI:  Long history of degenerative back pain, h/o cervical & lumbar fusions    Subjective:  Pt stated her back and RLt hip are worsening.  She has her epidural tomorrow.        Patient's Living Environment/PLOF: Independent with ADL's, has to modify or not perform lifting, carrying due to LBP, lives alone  Patient's Therapy Goals: To decrease pain, return to PLOF for ADL's, hiking, biking for health benefits      Pain:  Intensity:  6-7/10, surgical  (not recent)            Location: + radicular pain to (Lt) ankles, posterior, aching, Lt SIJ            Duration: constant            Aggravating Factor: Prolonged sitting, bending, carrying, lifting, sleeping, standing            Alleviating Factor:  Lyrica is no longer helping, trying to sleep            Clinical Progression: Gradually worsening      Social Factors:  No personal factors &/2-3 comorbidities     Precautions:  Hx cervical disectomy and fusion C3-C6 10/2014,cervical fusion 8/2015, lumbar laminectomy & fusion L4, L5 11/2021, fusion L3L4, Rt RTCR 3/2022     Objective     Palpation: + tenderness lower lumbar segments on the Lt, Lt glute med down to mid ITB    Dermatomal Impairment: L5, S1, S1       HEP Compliance:  100%    Transfers:  + use of hands to push off from low surfaces   Gait:   Grossly WNL's, starts to deteriorate with prolonged WB    Outcome Measures:  ADAMARIS:  40%    XR Lumbar:  11-19-24:  7 views    Laminectomy & posterior spinal fusion at L3-L5. The hardware is intact. There is no fracture or spondylolisthesis. No significant degenerative change the remainder of the lumbar spine.  Posterior fusion L3-L5 with intact hardware. No evidence of malalignment.      Treatments:    Manual:  inferior glides to Rt hip, lateral glides & lateral glides into ER, long axis distraction    Assessment:     Pt is still having having symptoms in her Rt hip & back from a recent exacerbation.  She reports the Lyrica is no longer working, sleep is an issue again.  Hopefully, the epidural will buy her some relief.      Plan: Continue per POC & pt tolerance.  Frequency/duration: 1x/wk for 6 visits, obtain LEFS for Hip

## 2024-12-23 ENCOUNTER — TREATMENT (OUTPATIENT)
Dept: PHYSICAL THERAPY | Facility: CLINIC | Age: 74
End: 2024-12-23
Payer: MEDICARE

## 2024-12-23 DIAGNOSIS — M54.17 LUMBOSACRAL NEURITIS: Primary | ICD-10-CM

## 2024-12-23 PROCEDURE — 97140 MANUAL THERAPY 1/> REGIONS: CPT | Mod: GP

## 2024-12-23 NOTE — PROGRESS NOTES
Physical Therapy Treatment Note     Patient Name: Lauren Fields  MRN: [id number]  Today's Date: 12/23/2024          Visit: 30 total for year, 6/MN for lumbar  Referred by:   Alcides Meza MD     Insurance:  [employer]   Certification Dates:  11-22-24 to 2-14-25    Current Problem: M54.17, Lumbar Neuritis  Problem List Items Addressed This Visit             ICD-10-CM       Neuro    Lumbosacral neuritis - Primary M54.17     1. Lumbosacral neuritis                 HPI:  Long history of degenerative back pain, h/o cervical & lumbar fusions    Subjective:  Pt stated she got her epidural last Thursday.  She has not gotten the response she had hoped for.  She has numbness, tingling, down to her Lt calf.  She would like to return to more active pursuits.      Patient's Living Environment/PLOF: Independent with ADL's, has to modify or not perform lifting, carrying due to LBP, lives alone  Patient's Therapy Goals: To decrease pain, return to PLOF for ADL's, hiking, biking for health benefits      Pain:  Intensity:  6/10, surgical  (not recent)            Location: + radicular pain to (Lt) ankles, posterior, aching, Lt SIJ            Duration: constant            Aggravating Factor: Prolonged sitting, bending, carrying, lifting, sleeping, standing            Alleviating Factor:  Lyrica is no longer helping, trying to sleep            Clinical Progression: Gradually worsening      Social Factors:  No personal factors &/2-3 comorbidities     Precautions:  Hx cervical disectomy and fusion C3-C6 10/2014,cervical fusion 8/2015, lumbar laminectomy & fusion L4, L5 11/2021, fusion L3L4, Rt RTCR 3/2022     Objective     Palpation: + tenderness lower lumbar segments on the Lt, Lt glute med down to mid ITB    Dermatomal Impairment: L5, S1, S1       HEP Compliance:  100%    Transfers:  + use of hands to push off from low surfaces   Gait:  Grossly WNL's, starts to deteriorate with prolonged WB    Outcome Measures:  ADAMARIS:   40%    XR Lumbar:  11-19-24:  7 views    Laminectomy & posterior spinal fusion at L3-L5. The hardware is intact. There is no fracture or spondylolisthesis. No significant degenerative change the remainder of the lumbar spine.  Posterior fusion L3-L5 with intact hardware. No evidence of malalignment.      Treatments:    Manual:  STM through Lt ITB, proximal to distal, inferior glides, long axis distraction    Assessment:     Pt is still having having symptoms in her Rt hip & back, but today she stated increased symptoms from the epidural.  She was for a little bit after the procedure & increased WB increased her symptoms.  She is taking it easy for a week as instructed.   Manual has been beneficial, however, she only gets temporary relief.     Plan: Continue per POC & pt tolerance.  Frequency/duration: 1x/wk for 6 visits, obtain LEFS for Hip

## 2025-01-08 ENCOUNTER — TREATMENT (OUTPATIENT)
Dept: PHYSICAL THERAPY | Facility: CLINIC | Age: 75
End: 2025-01-08
Payer: MEDICARE

## 2025-01-08 DIAGNOSIS — M54.17 LUMBOSACRAL NEURITIS: ICD-10-CM

## 2025-01-08 PROCEDURE — 97140 MANUAL THERAPY 1/> REGIONS: CPT | Mod: GP

## 2025-01-08 NOTE — PROGRESS NOTES
Physical Therapy Treatment Note     Patient Name: Lauren Fields  MRN: [id number]  Today's Date: 1/8/2025     Time Calculation  Start Time: 1400  Stop Time: 1445  Time Calculation (min): 45 min  PT Therapeutic Procedures Time Entry  Manual Therapy Time Entry: 45    Visit: 31 total for year, 7/MN for lumbar  Referred by:   Alcides Meza MD     Insurance:  [employer]   Certification Dates:  11-22-24 to 2-14-25    Current Problem: M54.17, Lumbar Neuritis  Problem List Items Addressed This Visit             ICD-10-CM       Neuro    Lumbosacral neuritis M54.17     1. Lumbosacral neuritis  Follow Up In Physical Therapy               HPI:  Long history of degenerative back pain, h/o cervical & lumbar fusions    Subjective:  Pt stated she still has some radicular symptoms on the Lt LE.  She is scheduled for her MRI this Monday.  She anxious about the upcoming results.      Patient's Living Environment/PLOF: Independent with ADL's, has to modify or not perform lifting, carrying due to LBP, lives alone  Patient's Therapy Goals: To decrease pain, return to PLOF for ADL's, hiking, biking for health benefits      Pain:  Intensity:  4/10, surgical  (not recent)            Location: + radicular pain to (Lt) ankles, posterior, aching, Lt SIJ            Duration: constant            Aggravating Factor: Prolonged sitting, bending, carrying, lifting, sleeping, standing            Alleviating Factor:  Lyrica is no longer helping, trying to sleep            Clinical Progression: Gradually worsening      Social Factors:  No personal factors &/2-3 comorbidities     Precautions:  Hx cervical disectomy & fusion C3-C6 10/2014, cervical fusion 8/2015, lumbar laminectomy & fusion L4, L5 11/2021, fusion L3L4, Rt RTCR 3/2022     Objective     Palpation: + tenderness lower lumbar segments on the Lt, Lt glute med down to mid ITB    Dermatomal Impairment: L5, S1, S1       HEP Compliance:  100%    Transfers:  + use of hands to push off  from low surfaces   Gait:  Grossly WNL's, starts to deteriorate with prolonged WB    Outcome Measures:  ADAMARIS:  40%    XR Lumbar:  11-19-24:  7 views    Laminectomy & posterior spinal fusion at L3-L5. The hardware is intact. There is no fracture or spondylolisthesis. No significant degenerative change the remainder of the lumbar spine.  Posterior fusion L3-L5 with intact hardware. No evidence of malalignment.      Treatments:    Manual:  STM through Lt ITB, proximal to distal, inferior glides, long axis distraction    Assessment:     Pt stated what we did at her last visit was helpful.  MRI is scheduled for Monday, 13 January.  Pt is still having having symptoms in her Lt hip & back.  She gets some relief with her water classes.  Manual has been beneficial, however, she only gets temporary relief.       Plan: Continue per POC & pt tolerance.  Frequency/duration: 1x/wk for 6 visits, obtain LEFS for Hip

## 2025-01-10 ENCOUNTER — APPOINTMENT (OUTPATIENT)
Dept: PRIMARY CARE | Facility: CLINIC | Age: 75
End: 2025-01-10
Payer: MEDICARE

## 2025-01-10 VITALS
SYSTOLIC BLOOD PRESSURE: 102 MMHG | HEIGHT: 62 IN | BODY MASS INDEX: 24.25 KG/M2 | DIASTOLIC BLOOD PRESSURE: 64 MMHG | WEIGHT: 131.8 LBS

## 2025-01-10 DIAGNOSIS — M70.61 TROCHANTERIC BURSITIS, RIGHT HIP: ICD-10-CM

## 2025-01-10 DIAGNOSIS — E78.5 HYPERLIPIDEMIA, UNSPECIFIED HYPERLIPIDEMIA TYPE: ICD-10-CM

## 2025-01-10 DIAGNOSIS — R01.1 MURMUR: ICD-10-CM

## 2025-01-10 DIAGNOSIS — R73.01 IMPAIRED FASTING BLOOD SUGAR: ICD-10-CM

## 2025-01-10 DIAGNOSIS — M81.0 OSTEOPOROSIS, UNSPECIFIED OSTEOPOROSIS TYPE, UNSPECIFIED PATHOLOGICAL FRACTURE PRESENCE: Primary | ICD-10-CM

## 2025-01-10 PROCEDURE — 1157F ADVNC CARE PLAN IN RCRD: CPT | Performed by: INTERNAL MEDICINE

## 2025-01-10 PROCEDURE — 1159F MED LIST DOCD IN RCRD: CPT | Performed by: INTERNAL MEDICINE

## 2025-01-10 PROCEDURE — 1123F ACP DISCUSS/DSCN MKR DOCD: CPT | Performed by: INTERNAL MEDICINE

## 2025-01-10 PROCEDURE — 1036F TOBACCO NON-USER: CPT | Performed by: INTERNAL MEDICINE

## 2025-01-10 PROCEDURE — 99214 OFFICE O/P EST MOD 30 MIN: CPT | Performed by: INTERNAL MEDICINE

## 2025-01-10 PROCEDURE — 1160F RVW MEDS BY RX/DR IN RCRD: CPT | Performed by: INTERNAL MEDICINE

## 2025-01-10 PROCEDURE — 1158F ADVNC CARE PLAN TLK DOCD: CPT | Performed by: INTERNAL MEDICINE

## 2025-01-10 PROCEDURE — G2211 COMPLEX E/M VISIT ADD ON: HCPCS | Performed by: INTERNAL MEDICINE

## 2025-01-10 RX ORDER — PREGABALIN 75 MG/1
75 CAPSULE ORAL NIGHTLY
COMMUNITY
Start: 2024-12-16

## 2025-01-10 NOTE — PROGRESS NOTES
"Subjective   Reason for Visit: Lauren Fields is an 74 y.o. female here for f/u      Past Medical, Surgical, and Family History reviewed and updated in chart.         HPI    Overall well   Spine issues remain a major concern- MRI pending  Remains active  No falls.  No fx's  Diet good    Patient Care Team:  Ann Morgan MD as PCP - General  Ann Morgan MD as PCP - [employer] PCP  Naomie Philippe RN as Registered Nurse (Orthopaedic Surgery)  Ambreen Ortega RN as Registered Nurse (Orthopaedic Surgery)     Review of Systems    Objective   Vitals:  /64   Ht 1.581 m (5' 2.25\")   Wt 59.8 kg (131 lb 12.8 oz)   BMI 23.91 kg/m²       Physical Exam  Constitutional:       General: She is not in acute distress.     Appearance: Normal appearance. She is not ill-appearing.   HENT:      Head: Normocephalic and atraumatic.      Right Ear: Tympanic membrane and ear canal normal.      Left Ear: Tympanic membrane and ear canal normal.      Nose: Nose normal.      Mouth/Throat:      Mouth: Mucous membranes are moist.      Pharynx: Oropharynx is clear.   Eyes:      General: No scleral icterus.     Extraocular Movements: Extraocular movements intact.      Conjunctiva/sclera: Conjunctivae normal.      Pupils: Pupils are equal, round, and reactive to light.   Cardiovascular:      Rate and Rhythm: Normal rate and regular rhythm.      Pulses: Normal pulses.      Heart sounds: No murmur heard.     No friction rub.   Pulmonary:      Effort: Pulmonary effort is normal.      Breath sounds: Normal breath sounds. No rhonchi or rales.   Abdominal:      General: Abdomen is flat. Bowel sounds are normal. There is no distension.      Palpations: Abdomen is soft. There is no mass.      Tenderness: There is no abdominal tenderness.   Musculoskeletal:      Cervical back: Normal range of motion and neck supple.      Right lower leg: No edema.      Left lower leg: No edema.   Skin:     General: Skin is warm.   Neurological:      " General: No focal deficit present.      Mental Status: She is alert and oriented to person, place, and time.      Cranial Nerves: No cranial nerve deficit.   Psychiatric:         Mood and Affect: Mood normal.         Behavior: Behavior normal.         Judgment: Judgment normal.         Lab Results   Component Value Date    WBC 4.9 12/12/2024    HGB 13.3 12/12/2024    HCT 40.4 12/12/2024     12/12/2024    CHOL 250 (H) 12/12/2024    TRIG 88 12/12/2024    HDL 93.0 12/12/2024    ALT 19 12/12/2024    AST 21 02/11/2019     12/12/2024    K 4.5 12/12/2024     12/12/2024    CREATININE 0.85 12/12/2024    BUN 16 12/12/2024    CO2 30 12/12/2024    TSH 1.01 12/12/2024    HGBA1C 5.7 (H) 12/12/2024       XR DEXA bone density    Result Date: 9/10/2024  Interpreted By:  Raquel Mcmahon, STUDY: DEXA BONE DENSITY9/10/2024 9:14 am   INDICATION: Signs/Symptoms:screening.  The patient is a 73 y/o  year old F.   ,Z78.0 Asymptomatic menopausal state   COMPARISON: 08/03/2021, 01/10/2018, 09/20/2013   ACCESSION NUMBER(S): [id number]   ORDERING CLINICIAN: JUANA SULTANA   TECHNIQUE: DEXA BONE DENSITY   FINDINGS:   LEFT FEMUR -TOTAL Bone Mineral Density: 0.782 T-Score -1.8   Z-Score  0.0 Bone Mineral Density change vs baseline: -7.8% Bone Mineral Density change vs previous: -2.0%   LEFT FEMUR -NECK Bone Mineral Density: 0.802 T-Score -1.7  Z-Score 0.2 Bone Mineral Density change vs baseline: -7.3% Bone Mineral Density change vs previous: 3.0%   LEFT FOREARM Total Bone Mineral Density: 0.427 T-Score -4.1  Z-Score -1.8 UD Bone Mineral Density: 0.302 T-Score -3.6 Z-Score -1.3 1/3 Bone Mineral Density: 0.580 T-Score -3.4   Z-Score -1.1 Bone Mineral Density change vs baseline:  Not reported Bone Mineral Density change vs previous:  Not reported     World Health Organization (WHO) criteria for post-menopausal,  Women: Normal:         T-score at or above -1 SD Osteopenia:   T-score between -1 and -2.5 SD Osteoporosis: T-score  at or below -2.5 SD     10-year Fracture Risk: Major Osteoporotic Fracture  17.9 Hip Fracture                        4.8   This exam was performed at [facility] on a [employer] Dexa Unit.       DEXA:  According to World Health Organization criteria, classification is osteoporosis.   Followup recommended in two years or sooner as clinically warranted.   All images and detailed analysis are available on the  Radiology PACS.   MACRO: None     Signed by: Raquel Mcmahon 9/10/2024 9:38 AM Dictation workstation:   [id number]      Assessment/Plan   Problem List Items Addressed This Visit             ICD-10-CM    Trochanteric bursitis, right hip M70.61    Relevant Orders    Referral to [facility]    Referral to [facility]     Other Visit Diagnoses         Codes    Murmur    -  Primary R01.1    Relevant Orders    Transthoracic Echo (TTE) Complete    Osteoporosis, unspecified osteoporosis type, unspecified pathological fracture presence     M81.0    Relevant Orders    Referral to Rheumatology            #1 HSV- stable. Prn Rx  #2 lumbar spine disease/djd (hip)- Uncomplicated.  Stable/doing well. f/u spine surgery, ortho. Sports med, PT.   #3 vitamin D deficiency-continue.    #4 osteopenia- vs osteoporisis - on recent scan in forearm.  Will c/s rheum.   #5 hyperlipidemia-elevated LDL.  ASCVD risk score~11%. CACS=0.  +fhx CAD.  Reviewed pros and cons of treatment.  declines  #6 rt RC- f/u ortho  #7 hip pain- f/u ortho  #8 colon polyps- last 2020. f/u 2025  #9 impaired fasting blood sugar-reviewed.  Discussed low sugar/low carbohydrate diet with daily exercise.  Discussed option of metformin, declines.  Retest 6 mths    ?fhx ?HOCM (brother)- will obtain echo         Patient was identified as a fall risk. Risk prevention instructions provided.

## 2025-01-11 NOTE — PATIENT INSTRUCTIONS

## 2025-01-15 ENCOUNTER — TREATMENT (OUTPATIENT)
Dept: PHYSICAL THERAPY | Facility: CLINIC | Age: 75
End: 2025-01-15
Payer: MEDICARE

## 2025-01-15 DIAGNOSIS — M54.17 LUMBOSACRAL NEURITIS: Primary | ICD-10-CM

## 2025-01-15 PROCEDURE — 97140 MANUAL THERAPY 1/> REGIONS: CPT | Mod: GP

## 2025-01-15 NOTE — PROGRESS NOTES
Physical Therapy Treatment Note     Patient Name: Lauren Fields  MRN: [id number]  Today's Date: 1/15/2025     Time Calculation  Start Time: 1400  Stop Time: 1445  Time Calculation (min): 45 min  PT Therapeutic Procedures Time Entry  Manual Therapy Time Entry: 45    Visit: 32 total, 8/MN for lumbar  Referred by:   Alcides Meza MD     Insurance:  [employer]   Certification Dates:  11-22-24 to 2-14-25    Current Problem: M54.17, Lumbar Neuritis  Problem List Items Addressed This Visit             ICD-10-CM       Neuro    Lumbosacral neuritis - Primary M54.17     1. Lumbosacral neuritis  Follow Up In Physical Therapy               HPI:  Long history of degenerative back pain, h/o cervical & lumbar fusions    Subjective:  Pt stated she has pain in the left lateral part of her hip today.  She had her MRI on Monday.      Patient's Living Environment/PLOF: Independent with ADL's, has to modify or not perform lifting, carrying due to LBP, lives alone  Patient's Therapy Goals: To decrease pain, return to PLOF for ADL's, hiking, biking for health benefits      Pain:  Intensity:  4/10, surgical  (not recent)            Location: + radicular pain to (Lt) ankles, posterior, aching, Lt SIJ            Duration: constant            Aggravating Factor: Prolonged sitting, bending, carrying, lifting, sleeping, standing            Alleviating Factor:  Lyrica is no longer helping, trying to sleep            Clinical Progression: Gradually worsening      Social Factors:  No personal factors &/2-3 comorbidities     Precautions:  Hx cervical disectomy & fusion C3-C6 10/2014, cervical fusion 8/2015, lumbar laminectomy & fusion L4, L5 11/2021, fusion L3L4, Rt RTCR 3/2022     Objective     Palpation: + tenderness lower lumbar segments on the Lt, Lt glute med down to mid ITB    Dermatomal Impairment: L5, S1, S1       HEP Compliance:  100%    Transfers:  + use of hands to push off from low surfaces   Gait:  Grossly WNL's, starts to  deteriorate with prolonged WB    Outcome Measures:  ADAMARIS:  40%    XR Lumbar:  11-19-24:  7 views    Laminectomy & posterior spinal fusion at L3-L5. The hardware is intact. There is no fracture or spondylolisthesis. No significant degenerative change the remainder of the lumbar spine.  Posterior fusion L3-L5 with intact hardware. No evidence of malalignment.      Treatments:    Manual:  STM through Lt ITB, proximal to distal, inferior glides, long axis distraction Lt LE, PA mobs L3-L5, (B) to improve mobility    Assessment:     Pt stated she did get her MRI on Monday, 13 January.  She does not have the results yet.  Pt is still having having symptoms in her Lt hip & back.  She gets some relief with her water classes but needs to get ready for a trip in May that will require a lot of walking.  Manual has been beneficial.      Plan: Continue per POC & pt tolerance.  Frequency/duration: 1x/wk for 6 visits, obtain LEFS for Hip

## 2025-01-28 ENCOUNTER — HOSPITAL ENCOUNTER (OUTPATIENT)
Dept: CARDIOLOGY | Facility: CLINIC | Age: 75
Discharge: HOME | End: 2025-01-28
Payer: MEDICARE

## 2025-01-28 DIAGNOSIS — R01.1 MURMUR: ICD-10-CM

## 2025-01-28 LAB
AORTIC VALVE PEAK VELOCITY: 1.38 M/S
AV PEAK GRADIENT: 8 MMHG
AVA (PEAK VEL): 2.19 CM2
EJECTION FRACTION APICAL 4 CHAMBER: 70.3
EJECTION FRACTION: 69 %
LEFT ATRIUM VOLUME AREA LENGTH INDEX BSA: 20.7 ML/M2
LEFT VENTRICLE INTERNAL DIMENSION DIASTOLE: 3.44 CM (ref 3.5–6)
LEFT VENTRICULAR OUTFLOW TRACT DIAMETER: 1.8 CM
MITRAL VALVE E/A RATIO: 0.88
RIGHT VENTRICLE FREE WALL PEAK S': 14 CM/S
RIGHT VENTRICLE PEAK SYSTOLIC PRESSURE: 25 MMHG
TRICUSPID ANNULAR PLANE SYSTOLIC EXCURSION: 2.2 CM

## 2025-01-28 PROCEDURE — 93306 TTE W/DOPPLER COMPLETE: CPT | Performed by: INTERNAL MEDICINE

## 2025-01-28 PROCEDURE — 93306 TTE W/DOPPLER COMPLETE: CPT

## 2025-01-30 ENCOUNTER — TREATMENT (OUTPATIENT)
Dept: PHYSICAL THERAPY | Facility: CLINIC | Age: 75
End: 2025-01-30
Payer: MEDICARE

## 2025-01-30 DIAGNOSIS — M54.17 LUMBOSACRAL NEURITIS: Primary | ICD-10-CM

## 2025-01-30 NOTE — PROGRESS NOTES
Physical Therapy Treatment Note     Patient Name: Lauren Fields  MRN: [id number]  Today's Date: 1/30/2025          Visit: 33 total, 9/MN for lumbar  Referred by:   Alcides Meza MD     Insurance:  [employer]   Certification Dates:  11-22-24 to 2-14-25    Current Problem: M54.17, Lumbar Neuritis  Problem List Items Addressed This Visit             ICD-10-CM       Neuro    Lumbosacral neuritis - Primary M54.17     1. Lumbosacral neuritis                 HPI:  Long history of degenerative back pain, h/o cervical & lumbar fusions    Subjective:  Pt stated she has pain in the left lateral part of her hip today.  She had her MRI on Monday.      Patient's Living Environment/PLOF: Independent with ADL's, has to modify or not perform lifting, carrying due to LBP, lives alone  Patient's Therapy Goals: To decrease pain, return to PLOF for ADL's, hiking, biking for health benefits      Pain:  Intensity:  4/10, surgical  (not recent)            Location: + radicular pain to (Lt) ankles, posterior, aching, Lt SIJ            Duration: constant            Aggravating Factor: Prolonged sitting, bending, carrying, lifting, sleeping, standing            Alleviating Factor:  Lyrica is no longer helping, trying to sleep            Clinical Progression: Gradually worsening      Social Factors:  No personal factors &/2-3 comorbidities     Precautions:  Hx cervical disectomy & fusion C3-C6 10/2014, cervical fusion 8/2015, lumbar laminectomy & fusion L4, L5 11/2021, fusion L3L4, Rt RTCR 3/2022     Objective     Palpation: + tenderness lower lumbar segments on the Lt, Lt glute med down to mid ITB    Dermatomal Impairment: L5, S1, S1       HEP Compliance:  100%    Transfers:  + use of hands to push off from low surfaces   Gait:  Grossly WNL's, starts to deteriorate with prolonged WB    Outcome Measures:  ADAMARIS:  40%    XR Lumbar:  11-19-24:  7 views    Laminectomy & posterior spinal fusion at L3-L5. The hardware is intact. There is  no fracture or spondylolisthesis. No significant degenerative change the remainder of the lumbar spine.  Posterior fusion L3-L5 with intact hardware. No evidence of malalignment.      Treatments:    Manual:  STM through Lt ITB, proximal to distal, inferior glides, long axis distraction Lt LE, PA mobs L3-L5, (B) to improve mobility    Assessment:     Pt stated she did get her MRI on Monday, 13 January.  She does not have the results yet.  Pt is still having having symptoms in her Lt hip & back.  She gets some relief with her water classes but needs to get ready for a trip in May that will require a lot of walking.  Manual has been beneficial.      Plan: Continue per POC & pt tolerance.  Frequency/duration: 1x/wk for 6 visits, obtain LEFS for Hip

## 2025-02-04 ASSESSMENT — RHEUMATOLOGY NEW PATIENT QUESTIONNAIRE
MORNING STIFFNESS IN LOWER BACK: Y
JOINT PAIN: Y
DOUBLE OR BLURRED VISION: N
SUN SENSITIVE (SUN ALLERGY): N
CHEST PAIN: N
NUMBNESS OR TINGLING IN HANDS OR FEET: Y
EASY BRUISING: Y
BLACK STOOLS: N
SWOLLEN LEGS OR FEET: N
SKIN REDNESS: N
BEHAVIORAL CHANGES: N
VAGINAL DRYNESS: Y
SWOLLEN OR TENDER GLANDS: N
SORES IN MOUTH OR NOSE: N
DIFFICULTY STAYING ASLEEP: N
EASILY LOSING TEMPER: N
FEVER: N
COUGH: N
AGITATION: N
LOSS OF VISION: N
EYE REDNESS: N
FAINTING: N
UNUSUALLY RAPID OR SLOWED HEART RATE: N
VOMITING OF BLOOD OR COFFEE GROUND CONSISTENCY MATERIAL: N
ANXIETY: N
DIFFICULTY FALLING ASLEEP: N
UNUSUAL FATIGUE: Y
NAUSEA: N
SKIN TIGHTNESS: N
EXCESSIVE HAIR LOSS (MORE THAN YOUR NORM): N
JAUNDICE: N
NODULES/BUMPS: N
EYE PAIN: N
MUSCLE WEAKNESS: N
HOARSE VOICE: N
HEARTBURN OR REFLUX: N
HOW WOULD YOU DESCRIBE YOUR STIFFNESS ON AVERAGE: MODERATE
PAIN OR BURNING ON URINATION: N
ANEMIA: N
DIFFICULTY SWALLOWING: N
UNEXPLAINED HEARING LOSS: N
JOINT SWELLING: N
LOSS OF CONSCIOUSNESS: N
COLOR CHANGES OF HANDS OR FEET IN THE COLD: Y
RASH: N
HEADACHES: N
STOMACH PAIN: N
DIFFICULTY BREATHING LYING DOWN: N
MORNING STIFFNESS: Y
PERSISTENT DIARRHEA: N
BLOOD IN STOOLS: N
UNEXPLAINED WEIGHT CHANGE: N
UNUSUAL BLEEDING: N
INCREASED SUSCEPTIBILITY TO INFECTION: N
ABNORMAL URINE: N
MEMORY LOSS: N
NIGHT SWEATS: N
RASH OR ULCERS: N
DRYNESS OF MOUTH: N
SHORTNESS OF BREATH: N
DEPRESSION: N
EYE DRYNESS: Y
SEIZURES: N

## 2025-02-05 ENCOUNTER — TREATMENT (OUTPATIENT)
Dept: PHYSICAL THERAPY | Facility: CLINIC | Age: 75
End: 2025-02-05
Payer: MEDICARE

## 2025-02-05 DIAGNOSIS — M54.17 LUMBOSACRAL NEURITIS: ICD-10-CM

## 2025-02-05 PROCEDURE — 97140 MANUAL THERAPY 1/> REGIONS: CPT | Mod: GP

## 2025-02-05 PROCEDURE — 97035 APP MDLTY 1+ULTRASOUND EA 15: CPT | Mod: GP

## 2025-02-05 NOTE — PROGRESS NOTES
Physical Therapy Treatment Note     Patient Name: Lauren Fields  MRN: [id number]  Today's Date: 2/5/2025     Time Calculation  Start Time: 1400  Stop Time: 1458  Time Calculation (min): 58 min  PT Modalities Time Entry  Ultrasound Time Entry: 8  PT Therapeutic Procedures Time Entry  Manual Therapy Time Entry: 50    Visit: 33 total, 9/MN for lumbar  Referred by:   Alcides Meza MD     Insurance:  [employer]   Certification Dates:  11-22-24 to 2-14-25    Current Problem: M54.17, Lumbar Neuritis  Problem List Items Addressed This Visit             ICD-10-CM       Neuro    Lumbosacral neuritis M54.17     1. Lumbosacral neuritis  Follow Up In Physical Therapy               HPI:  Long history of degenerative back pain, h/o cervical & lumbar fusions    Subjective:  Pt stated she got her MRI results back & was told that it looks good.  She was referred to chiropractic & acupuncture.  She is in the process of trying to set these appts up.  She still has pain that travels down into the Lt leg.        Patient's Living Environment/PLOF: Independent with ADL's, has to modify or not perform lifting, carrying due to LBP, lives alone  Patient's Therapy Goals: To decrease pain, return to PLOF for ADL's, hiking, biking for health benefits      Pain:  Intensity:  4/10, surgical  (not recent)            Location: + radicular pain to (Lt) ankles, posterior, aching, Lt SIJ            Duration: constant            Aggravating Factor: Prolonged sitting, bending, carrying, lifting, sleeping, standing            Alleviating Factor:  Lyrica is no longer helping, trying to sleep            Clinical Progression: Gradually worsening      Social Factors:  No personal factors &/2-3 comorbidities     Precautions:  Hx cervical disectomy & fusion C3-C6 10/2014, cervical fusion 8/2015, lumbar laminectomy & fusion L4, L5 11/2021, fusion L3L4, Rt RTCR 3/2022     Objective     Palpation: + tenderness lower lumbar segments on the Lt, Lt glute  med, ITB & Lt peroneals    Dermatomal Impairment: L5, S1, S1       HEP Compliance:  100%    Gait:  Grossly WNL's, starts to deteriorate with prolonged WB    Outcome Measures:  ADAMARIS:  40%    XR Lumbar:  11-19-24:  7 views    Laminectomy & posterior spinal fusion at L3-L5. The hardware is intact. There is no fracture or spondylolisthesis. No significant degenerative change the remainder of the lumbar spine.  Posterior fusion L3-L5 with intact hardware. No evidence of malalignment.      MRI Lumbar Spine with and without contrast:      T12/L1:  There is no significant disc bulge/protrusion, neuroforaminal stenosis, or central canal stenosis.   L1/L2:  Moderate facet and ligamentum flavum hypertrophy. Mild (B) neuroforaminal narrowing.    L2/L3:  Severe facet and ligamentum flavum [id number]. Mild (B) neuroforaminal narrowing.       Treatments:    US:  8 min, continuous, 1.4 w/cm2 , 1.0  MHz, to Lt glute med, Lt ITB and posterior glute, pt in Rt sidelying    Manual:  STM through Lt ITB, proximal to distal, inferior glides, long axis distraction Lt LE, PA mobs L3-L5, (B) to improve mobility    Assessment:     Pt stated she feels a little more mobility and less intense lumbar pain/Lt hip pain, however, she still has radicular pain down into the Lt ankle.  She gets some relief with her water classes and manual therapy but pain is always present.  She is worried about her upcoming trip to [place] in May that will require a lot of walking.  Manual performed as noted above.        Plan: Continue per POC & pt tolerance.  Frequency/duration: 1x/wk for 6 visits, obtain LEFS for Hip

## 2025-02-08 PROBLEM — M81.0 OSTEOPOROSIS: Status: ACTIVE | Noted: 2025-02-08

## 2025-02-08 NOTE — PROGRESS NOTES
"Subjective   75 y.o. female who I am asked to see in consultation for New Patient Visit (Results of Bone Density/Osteoporosis /Referred by Dr. Morgan).    HPI     Osteoporosis Risk Factors     Personal Hx of fracture: Pelvic fx  and ? L femur hairline fx  Hx of fracture in Mother: none  Height loss: 2+ inches   Tobacco use: no  Alcoholism: social  Recurrent falls: November on ice L shoulder   Inadequate physical activity: water, bike elliptical hikes  Calcium: told to take oral CA supplement  Vit D: 2000 units vit d 54 in 2022  4 spinal ops cervical L5 all fusion  THR R  Ulnar N removed arm   Hx of GERD or other esophageal disease:  some meds with issues   Plans for invasive dental procedures:  none    ROS    BP 98/57 (BP Location: Right arm, Patient Position: Sitting, BP Cuff Size: Adult)   Pulse 77   Ht 1.575 m (5' 2\")   Wt 61 kg (134 lb 6.4 oz)   SpO2 97%   BMI 24.58 kg/m²     PHYSICAL EXAM    Imaging  Bone Density: 2024      LEFT FEMUR -TOTAL  Bone Mineral Density: 0.782  T-Score -1.8   Z-Score  0.0  Bone Mineral Density change vs baseline: -7.8%  Bone Mineral Density change vs previous: -2.0%      LEFT FEMUR -NECK  Bone Mineral Density: 0.802  T-Score -1.7  Z-Score 0.2  Bone Mineral Density change vs baseline: -7.3%  Bone Mineral Density change vs previous: 3.0%      LEFT FOREARM  Total  Bone Mineral Density: 0.427  T-Score -4.1  Z-Score -1.8  UD  Bone Mineral Density: 0.302  T-Score -3.6 Z-Score -1.3  1/3  Bone Mineral Density: 0.580  T-Score -3.4   Z-Score -1.1    FRAX SCORE 2024  10-year Fracture Risk:  Major Osteoporotic Fracture  17.9  Hip Fracture                        4.8  Current Outpatient Medications   Medication Sig Dispense Refill    carboxymethylcellulose sodium (Refresh Contacts) drops OPHTHalmic solution 1 drop 3 times a day.      cholecalciferol (Vitamin D-3) 25 MCG (1000 UT) capsule Take 1 capsule (25 mcg) by mouth once daily.      diclofenac sodium (Voltaren) 1 % gel Apply 4.5 inches (4 g) " "topically 4 times a day as needed (PRN). 100 g 1    estrogens, conjugated, (Premarin) vaginal cream Insert 1 g into the vagina 2 times a week. Twice a week 45 g 2    fluticasone (Flonase) 50 mcg/actuation nasal spray Administer into affected nostril(s) once daily as needed for allergies.      multivitamin-min-iron-FA-vit K (Adults Multivitamin) 18 mg iron-400 mcg-25 mcg tablet Take 1 tablet by mouth once daily.      pregabalin (Lyrica) 75 mg capsule Take 1 capsule (75 mg) by mouth at 3:00 in the morning.      TURMERIC, BULK, MISC Take by mouth once daily.      valACYclovir (Valtrex) 500 mg tablet Take 1 tablet (500 mg) by mouth once daily. 90 tablet 3     No current facility-administered medications for this visit.         Lab Review  Lab Results   Component Value Date    CALCIUM 9.3 12/12/2024     Lab Results   Component Value Date    CREATININE 0.85 12/12/2024     No results found for: \"EGFRMUTATION\"  No results found for: \"PTH\"  Lab Results   Component Value Date    VITD25 54 08/15/2022     Lab Results   Component Value Date    TSH 1.01 12/12/2024     No components found for: \"CMP\"     Assessment/Plan   Diagnoses and all orders for this visit:  Vitamin D deficiency  Osteoporosis, unspecified osteoporosis type, unspecified pathological fracture presence  -     Referral to Rheumatology    Osteoporosis with significant risk factors    Discussed treatment options including alendronate Reclast and Prolia    After discussing efficacy and side effects she decided that she wants to at least try alendronate and and if she has significant GI side effects we will switch agents at that time.    I will get some baseline labs especially the bone turnover marker serum CTX which needs to be fasting in the a.m.    I will see the patient back for follow-up in 1 year.    Repeat DXA in   "

## 2025-02-10 ENCOUNTER — APPOINTMENT (OUTPATIENT)
Dept: RHEUMATOLOGY | Facility: CLINIC | Age: 75
End: 2025-02-10
Payer: MEDICARE

## 2025-02-10 VITALS
WEIGHT: 134.4 LBS | BODY MASS INDEX: 24.73 KG/M2 | HEIGHT: 62 IN | HEART RATE: 77 BPM | OXYGEN SATURATION: 97 % | DIASTOLIC BLOOD PRESSURE: 57 MMHG | SYSTOLIC BLOOD PRESSURE: 98 MMHG

## 2025-02-10 DIAGNOSIS — E55.9 VITAMIN D DEFICIENCY: Primary | ICD-10-CM

## 2025-02-10 DIAGNOSIS — M81.0 OSTEOPOROSIS, UNSPECIFIED OSTEOPOROSIS TYPE, UNSPECIFIED PATHOLOGICAL FRACTURE PRESENCE: ICD-10-CM

## 2025-02-10 PROCEDURE — 99204 OFFICE O/P NEW MOD 45 MIN: CPT | Performed by: INTERNAL MEDICINE

## 2025-02-10 PROCEDURE — 1159F MED LIST DOCD IN RCRD: CPT | Performed by: INTERNAL MEDICINE

## 2025-02-10 PROCEDURE — 1036F TOBACCO NON-USER: CPT | Performed by: INTERNAL MEDICINE

## 2025-02-10 PROCEDURE — 1157F ADVNC CARE PLAN IN RCRD: CPT | Performed by: INTERNAL MEDICINE

## 2025-02-10 PROCEDURE — 1123F ACP DISCUSS/DSCN MKR DOCD: CPT | Performed by: INTERNAL MEDICINE

## 2025-02-10 RX ORDER — ALENDRONATE SODIUM 70 MG/1
70 TABLET ORAL
Qty: 4 TABLET | Refills: 11 | Status: SHIPPED | OUTPATIENT
Start: 2025-02-10 | End: 2026-02-10

## 2025-02-10 NOTE — LETTER
"February 10, 2025     Ann Morgan MD  [address]  [facility], [address]  [facility] [address]    Patient: Lauren Fields   YOB: 1950   Date of Visit: 2/10/2025       Dear Dr. Ann Morgan MD:    Thank you for referring Lauren Fields to me for evaluation. Below are my notes for this consultation.  If you have questions, please do not hesitate to call me. I look forward to following your patient along with you.       Sincerely,     Jose Armstrong, DO      CC: No Recipients  ______________________________________________________________________________________    Subjective  75 y.o. female who I am asked to see in consultation for New Patient Visit (Results of Bone Density/Osteoporosis /Referred by Dr. Morgan).    HPI     Osteoporosis Risk Factors     Personal Hx of fracture: Pelvic fx  and ? L femur hairline fx  Hx of fracture in Mother: none  Height loss: 2+ inches   Tobacco use: no  Alcoholism: social  Recurrent falls: November on ice L shoulder   Inadequate physical activity: water, bike elliptical hikes  Calcium: told to take oral CA supplement  Vit D: 2000 units vit d 54 in 2022  4 spinal ops cervical L5 all fusion  THR R  Ulnar N removed arm   Hx of GERD or other esophageal disease:  some meds with issues   Plans for invasive dental procedures:  none    ROS    BP 98/57 (BP Location: Right arm, Patient Position: Sitting, BP Cuff Size: Adult)   Pulse 77   Ht 1.575 m (5' 2\")   Wt 61 kg (134 lb 6.4 oz)   SpO2 97%   BMI 24.58 kg/m²     PHYSICAL EXAM    Imaging  Bone Density: 2024      LEFT FEMUR -TOTAL  Bone Mineral Density: 0.782  T-Score -1.8   Z-Score  0.0  Bone Mineral Density change vs baseline: -7.8%  Bone Mineral Density change vs previous: -2.0%      LEFT FEMUR -NECK  Bone Mineral Density: 0.802  T-Score -1.7  Z-Score 0.2  Bone Mineral Density change vs baseline: -7.3%  Bone Mineral Density change vs previous: 3.0%      LEFT FOREARM  Total  Bone Mineral Density: 0.427  T-Score " "-4.1  Z-Score -1.8  UD  Bone Mineral Density: 0.302  T-Score -3.6 Z-Score -1.3  1/3  Bone Mineral Density: 0.580  T-Score -3.4   Z-Score -1.1    FRAX SCORE 2024  10-year Fracture Risk:  Major Osteoporotic Fracture  17.9  Hip Fracture                        4.8  Current Outpatient Medications   Medication Sig Dispense Refill   • carboxymethylcellulose sodium (Refresh Contacts) drops OPHTHalmic solution 1 drop 3 times a day.     • cholecalciferol (Vitamin D-3) 25 MCG (1000 UT) capsule Take 1 capsule (25 mcg) by mouth once daily.     • diclofenac sodium (Voltaren) 1 % gel Apply 4.5 inches (4 g) topically 4 times a day as needed (PRN). 100 g 1   • estrogens, conjugated, (Premarin) vaginal cream Insert 1 g into the vagina 2 times a week. Twice a week 45 g 2   • fluticasone (Flonase) 50 mcg/actuation nasal spray Administer into affected nostril(s) once daily as needed for allergies.     • multivitamin-min-iron-FA-vit K (Adults Multivitamin) 18 mg iron-400 mcg-25 mcg tablet Take 1 tablet by mouth once daily.     • pregabalin (Lyrica) 75 mg capsule Take 1 capsule (75 mg) by mouth at 3:00 in the morning.     • TURMERIC, BULK, MISC Take by mouth once daily.     • valACYclovir (Valtrex) 500 mg tablet Take 1 tablet (500 mg) by mouth once daily. 90 tablet 3     No current facility-administered medications for this visit.         Lab Review  Lab Results   Component Value Date    CALCIUM 9.3 12/12/2024     Lab Results   Component Value Date    CREATININE 0.85 12/12/2024     No results found for: \"EGFRMUTATION\"  No results found for: \"PTH\"  Lab Results   Component Value Date    VITD25 54 08/15/2022     Lab Results   Component Value Date    TSH 1.01 12/12/2024     No components found for: \"CMP\"     Assessment/Plan  Diagnoses and all orders for this visit:  Vitamin D deficiency  Osteoporosis, unspecified osteoporosis type, unspecified pathological fracture presence  -     Referral to Rheumatology    Osteoporosis with significant risk " factors    Discussed treatment options including alendronate Reclast and Prolia    After discussing efficacy and side effects she decided that she wants to at least try alendronate and and if she has significant GI side effects we will switch agents at that time.    I will get some baseline labs especially the bone turnover marker serum CTX which needs to be fasting in the a.m.    I will see the patient back for follow-up in 1 year.    Repeat DXA in

## 2025-02-16 DIAGNOSIS — I34.0 MITRAL VALVE INSUFFICIENCY, UNSPECIFIED ETIOLOGY: Primary | ICD-10-CM

## 2025-02-24 ENCOUNTER — TREATMENT (OUTPATIENT)
Dept: PHYSICAL THERAPY | Facility: CLINIC | Age: 75
End: 2025-02-24
Payer: MEDICARE

## 2025-02-24 DIAGNOSIS — M54.17 LUMBOSACRAL NEURITIS: ICD-10-CM

## 2025-02-24 PROCEDURE — 97140 MANUAL THERAPY 1/> REGIONS: CPT | Mod: GP

## 2025-02-24 NOTE — PROGRESS NOTES
Physical Therapy Treatment Note     Patient Name: Lauren Fields  MRN: [id number]  Today's Date: 2/24/2025     Time Calculation  Start Time: 1400  Stop Time: 1445  Time Calculation (min): 45 min  PT Therapeutic Procedures Time Entry  Manual Therapy Time Entry: 45    Visit: 34 total, 10/MN for lumbar  Referred by:   Alcides Meza MD     Insurance:  [employer]   Certification Dates:  11-22-24 to 4-15-25    Current Problem: M54.17, Lumbar Neuritis  Problem List Items Addressed This Visit             ICD-10-CM       Neuro    Lumbosacral neuritis M54.17     1. Lumbosacral neuritis  Follow Up In Physical Therapy               HPI:  Long history of degenerative back pain, h/o cervical & lumbar fusions    Subjective:  Pt stated she has been to acupuncture 3 x now & she thinks it has been a little helpful.        Patient's Living Environment/PLOF: Independent with ADL's, has to modify or not perform lifting, carrying due to LBP, lives alone  Patient's Therapy Goals: To decrease pain, return to PLOF for ADL's, hiking, biking for health benefits      Pain:  Intensity:  3/10, surgical  (not recent)            Location: + radicular pain to (Lt) ankles, posterior, aching, Lt SIJ            Duration: constant            Aggravating Factor: Prolonged sitting, bending, carrying, lifting, sleeping, standing            Alleviating Factor:  Lyrica is no longer helping, trying to sleep            Clinical Progression: Gradually worsening      Social Factors:  No personal factors &/2-3 comorbidities     Precautions:  Hx cervical disectomy & fusion C3-C6 10/2014, cervical fusion 8/2015, lumbar laminectomy & fusion L4, L5 11/2021, fusion L3L4, Rt RTCR 3/2022     Objective     Palpation: + tenderness lower lumbar segments on the Lt, Lt glute med, ITB & Lt peroneals    Dermatomal Impairment: L5, S1, S1       HEP Compliance:  100%    Gait:  Grossly WNL's, starts to deteriorate with prolonged WB    Outcome Measures:  ADAMARIS:   40%    XR Lumbar:  11-19-24:  7 views    Laminectomy & posterior spinal fusion at L3-L5. The hardware is intact. There is no fracture or spondylolisthesis. No significant degenerative change the remainder of the lumbar spine.  Posterior fusion L3-L5 with intact hardware. No evidence of malalignment.      MRI Lumbar Spine with and without contrast:      T12/L1:  There is no significant disc bulge/protrusion, neuroforaminal stenosis, or central canal stenosis.   L1/L2:  Moderate facet and ligamentum flavum hypertrophy. Mild (B) neuroforaminal narrowing.    L2/L3:  Severe facet and ligamentum flavum [id number]. Mild (B) neuroforaminal narrowing.       Treatments:    Manual:  STM through (B) glute meds, ITB, proximal to distal, inferior glides, long axis distraction Lt LE, PA mobs L3-L5, (B) to improve mobility    Assessment:     Pt stated she still has a little sciatica going on in the Lt LE.  (B) hips remain problematic, limiting WB activities.    She is doing everything she can in preparation to her upcoming trip to [place] & lots of walking.  Manual performed as noted above.     Plan: Continue per POC & pt tolerance.  Frequency/duration: 1-2x/wk for 8 visits.  Will extend POC.

## 2025-02-28 ENCOUNTER — APPOINTMENT (OUTPATIENT)
Dept: PHYSICAL THERAPY | Facility: CLINIC | Age: 75
End: 2025-02-28
Payer: MEDICARE

## 2025-03-05 PROBLEM — I34.1 MVP (MITRAL VALVE PROLAPSE): Status: ACTIVE | Noted: 2025-03-05

## 2025-03-05 PROBLEM — I34.0 MITRAL VALVE REGURGITATION: Status: ACTIVE | Noted: 2025-03-05

## 2025-03-05 PROBLEM — I35.1 NONRHEUMATIC AORTIC VALVE INSUFFICIENCY: Status: ACTIVE | Noted: 2025-03-05

## 2025-03-06 ENCOUNTER — OFFICE VISIT (OUTPATIENT)
Dept: CARDIOLOGY | Facility: CLINIC | Age: 75
End: 2025-03-06
Payer: MEDICARE

## 2025-03-06 VITALS
HEIGHT: 62 IN | DIASTOLIC BLOOD PRESSURE: 73 MMHG | HEART RATE: 80 BPM | BODY MASS INDEX: 23.92 KG/M2 | WEIGHT: 130 LBS | SYSTOLIC BLOOD PRESSURE: 121 MMHG

## 2025-03-06 DIAGNOSIS — I34.0 MITRAL VALVE INSUFFICIENCY, UNSPECIFIED ETIOLOGY: ICD-10-CM

## 2025-03-06 DIAGNOSIS — E78.2 MIXED HYPERLIPIDEMIA: ICD-10-CM

## 2025-03-06 DIAGNOSIS — I35.1 NONRHEUMATIC AORTIC VALVE INSUFFICIENCY: Primary | ICD-10-CM

## 2025-03-06 DIAGNOSIS — I34.1 MVP (MITRAL VALVE PROLAPSE): ICD-10-CM

## 2025-03-06 PROCEDURE — 99214 OFFICE O/P EST MOD 30 MIN: CPT | Performed by: INTERNAL MEDICINE

## 2025-03-06 PROCEDURE — 1036F TOBACCO NON-USER: CPT | Performed by: INTERNAL MEDICINE

## 2025-03-06 PROCEDURE — 99204 OFFICE O/P NEW MOD 45 MIN: CPT | Performed by: INTERNAL MEDICINE

## 2025-03-06 PROCEDURE — 1159F MED LIST DOCD IN RCRD: CPT | Performed by: INTERNAL MEDICINE

## 2025-03-06 PROCEDURE — 1157F ADVNC CARE PLAN IN RCRD: CPT | Performed by: INTERNAL MEDICINE

## 2025-03-06 PROCEDURE — 1160F RVW MEDS BY RX/DR IN RCRD: CPT | Performed by: INTERNAL MEDICINE

## 2025-03-06 PROCEDURE — 1123F ACP DISCUSS/DSCN MKR DOCD: CPT | Performed by: INTERNAL MEDICINE

## 2025-03-06 NOTE — PROGRESS NOTES
Chief Complaint:   Valve Disorder     History of Present Illness     Lauren Fields is a 75 y.o. female presenting with 1-2+ MR and 2+ AI on Echo 1/28/25.  Echo was done to evaluate for HCM in family.  No HCM on Echo.  Exercises.  Denies CP or SCHULTE.  No palpitations or syncope.  No cardiac Hx.  CAC=0.  Brother has HCM.        Previous History     Past Medical History:  She has a past medical history of Arthritis (2009), Herpes simplex, HL (hearing loss) (2020), HNP (herniated nucleus pulposus), cervical, Hyperlipidemia, Lumbosacral disc disease (2009), Mitral valve regurgitation (03/05/2025), MVP (mitral valve prolapse) (03/05/2025), Nonrheumatic aortic valve insufficiency (03/05/2025), Osteoarthritis, Osteopenia, Prediabetes, and Spinal stenosis.    She has no past medical history of VIRGIL (acute kidney injury) (CMS-HCC), Anxiety, Autoimmune disorder (Multi), Bipolar disorder, BPH (benign prostatic hyperplasia), Cerebral aneurysm ([facility]), Cervical cancer, Chronic kidney disease, CKD (chronic kidney disease), Cognitive decline, Crohn's disease (Multi), Dementia, Depression, Dizziness, Dysphagia, Endometrial cancer (Multi), Esophageal cancer (Multi), Esophageal disease, ESRD (end stage renal disease) (Multi), Fibromyalgia, primary, Gastric cancer (Multi), Gender dysphoria, GERD (gastroesophageal reflux disease), GI (gastrointestinal bleed), Hemodialysis status (CMS-HCC), Hernia, internal, History of peritoneal dialysis, HIV disease (Multi), Immunocompromised, Irritable bowel syndrome, Liver disease, Mastocytosis, MS (multiple sclerosis) (Multi), Muscular dystrophy (Multi), Myasthenia gravis, Neuromuscular disorder (Multi), Ovarian cancer (Multi), Pancreatitis ([facility]), Peptic ulcer disease, Prematurity ([facility]), PTSD (post-traumatic stress disorder), Schizophrenia, Seizure disorder (Multi), Substance addiction (Multi), Syncope, TIA (transient ischemic attack), Ulcerative colitis, Urinary tract infection, Uterine  cancer (Multi), or Vertigo.    Past Surgical History:  She has a past surgical history that includes Cervical fusion (11/05/2015); Rotator cuff repair (Right, 03/29/2022); Trigger finger release; Trigger point injection (upper back); Total hip arthroplasty (Right, 03/29/2024); Cervical fusion (08/09/2016); Lumbar fusion (11/10/2021); Lumbar fusion (05/19/2023); Tonsillectomy; Breast cyst aspiration; Ulnar tunnel release (Left, 08/24/2015); Cataract extraction; Colonoscopy w/ polypectomy (10/27/2020); and Back surgery (2 lumbar laminectomies & fusions (L3-5)).      Social History:  She reports that she has never smoked. She has been exposed to tobacco smoke. She has never used smokeless tobacco. She reports current alcohol use of about 1.0 standard drink of alcohol per week. She reports that she does not use drugs.    Family History:  Family History   Problem Relation Name Age of Onset    Colon polyps Mother Denisse Fields     Dementia Mother Denisse Fields     Breast cancer Mother Denisse Fields     Dislocations Mother Denisse Fields     Osteoporosis Mother Denisse Fields     Rheumatologic disease Mother Denisse Fields     Arthritis Mother Denisse Fields     Vision loss Mother Denisse Fields     Heart disease Father Doc Fields     Diabetes Brother Joce Fields     Heart disease Brother Joce Fields 39        +tob/obese    Hyperlipidemia Brother Joce Fields     Hypertension Brother Joce Fields     Kidney disease Brother Joce Fields     Breast cancer Mother's Sister      Cancer Mother's Sister Ambreen Velasquez     Diabetes Maternal Grandmother Kate Hunterray     Arthritis Maternal Grandfather Dillon Hunterray     Cancer Other Ambreen Velasquez     Cancer Other Denissetatyana Lauratatyana     Cancer Other Marielle Thrusby     Diabetes Other Ema Mungo     Diabetes Other Yani Scofido         Allergies:  Hydromorphone, Acetaminophen, Amoxicillin-pot clavulanate, and Penicillins    Outpatient Medications:  Current Outpatient Medications   Medication Instructions    alendronate  "(FOSAMAX) 70 mg, oral, Every 7 days, Take on an empty stomach. Do not lie down or eat for 1/2 hour after taking.    carboxymethylcellulose sodium ([employer]) drops OPHTHalmic solution 1 drop, 3 times daily    cholecalciferol (Vitamin D-3) 25 MCG (1000 UT) capsule 1 capsule, Daily    diclofenac sodium (VOLTAREN) 4 g, Topical, 4 times daily PRN    estrogens (conjugated) (PREMARIN) 1 g, vaginal, 2 times weekly, Twice a week    fluticasone (Flonase) 50 mcg/actuation nasal spray Daily PRN    multivitamin-min-iron-FA-vit K (Adults Multivitamin) 18 mg iron-400 mcg-25 mcg tablet 1 tablet, Daily    pregabalin (LYRICA) 75 mg, Nightly    TURMERIC, BULK, MISC Daily    valACYclovir (VALTREX) 500 mg, oral, Daily       Physical Examination   Vitals:  Visit Vitals  /73   Pulse 80   Ht 1.575 m (5' 2\")   Wt 59 kg (130 lb)   BMI 23.78 kg/m²   OB Status Postmenopausal   Smoking Status Never   BSA 1.61 m²    Physical Exam  Vitals reviewed.   Constitutional:       General: She is not in acute distress.     Appearance: Normal appearance.   HENT:      Head: Normocephalic and atraumatic.      Nose: Nose normal.   Eyes:      Conjunctiva/sclera: Conjunctivae normal.   Cardiovascular:      Rate and Rhythm: Normal rate and regular rhythm.      Pulses: Normal pulses.      Heart sounds: No murmur heard.  Pulmonary:      Effort: Pulmonary effort is normal. No respiratory distress.      Breath sounds: Normal breath sounds. No wheezing, rhonchi or rales.   Abdominal:      General: Bowel sounds are normal. There is no distension.      Palpations: Abdomen is soft.      Tenderness: There is no abdominal tenderness.   Musculoskeletal:         General: No swelling.      Right lower leg: No edema.      Left lower leg: No edema.   Skin:     General: Skin is warm and dry.      Capillary Refill: Capillary refill takes less than 2 seconds.   Neurological:      General: No focal deficit present.      Mental Status: She is alert.   Psychiatric:    "      Mood and Affect: Mood normal.             Labs/Imaging/Cardiac Studies   I have personally reviewed the patient's available lab work, primary care appointment notes, pertinent imaging studies, and cardiac studies and have discussed them and my independent interpretation of those results with the patient and caregiver at this appointment.  All pertinent recent Emergency Department evaluations and Hospital admissions were also reviewed in detail with the patient and caregiver.    Reviewed Echo and Labs.    Echo:  Transthoracic Echo (TTE) Complete    Result Date: 1/28/2025        [facility] [place], [place] [address]        Tel [phone] and Fax [phone] TRANSTHORACIC ECHOCARDIOGRAM REPORT  Patient Name:       ANDERS RODGERS       Reading Physician:    [id number] Gustabo Marrero MD Study Date:         1/28/2025           Ordering Provider:    [id number] JUANA SULTANA MRN/PID:            [id number]            Fellow: Accession#:         [id number]        Nurse: Date of Birth/Age:  1950 / 75      Sonographer:          Yamilex jenkins                                     [facility] Gender assigned at  F                   Additional Staff: Birth: Height:             157.48 cm           Admit Date:           1/28/2025 Weight:             59.42 kg            Admission Status:     Outpatient BSA / BMI:          1.60 m2 / 23.96     Encounter#:           [id number]                     kg/m2 Blood Pressure:     /                   Department Location:  [facility] Study Type:    TRANSTHORACIC ECHO (TTE) COMPLETE Diagnosis/ICD: Cardiac murmur, unspecified-R01.1 Indication:    Heart Murmur CPT Code:      Echo Complete w Full Doppler-02209  Study Detail: The following Echo studies were  performed: 2D, M-Mode, Doppler and               color flow. Patient's heart rhythm is sinus bradycardia. A bubble               study was not performed.  PHYSICIAN INTERPRETATION: Left Ventricle: The left ventricular systolic function is normal, with a Gonsalves's biplane calculated ejection fraction of 69%. There are no regional left ventricular wall motion abnormalities. The left ventricular cavity size is normal. There is mildly increased septal and mildly increased posterior left ventricular wall thickness. There is left ventricular concentric remodeling. Spectral Doppler shows a Grade I (impaired relaxation pattern) of left ventricular diastolic filling with normal left atrial filling pressure. Left Atrium: The left atrial size is normal. A bubble study using agitated saline was not performed. Right Ventricle: The right ventricle is normal in size. There is normal right ventricular global systolic function. Right Atrium: The right atrium is normal in size. Aortic Valve: The aortic valve is trileaflet. There is mild aortic valve thickening. There is moderate aortic valve regurgitation. The peak instantaneous gradient of the aortic valve is 8 mmHg. Mitral Valve: The mitral valve is normal in structure. There is mild mitral valve prolapse of the posterior leaflet. There is mild mitral annular calcification. There is mild to moderate mitral valve regurgitation. Tricuspid Valve: The tricuspid valve is structurally normal. There is mild tricuspid regurgitation. Pulmonic Valve: The pulmonic valve is structurally normal. There is mild pulmonic valve regurgitation. Pericardium: There is no pericardial effusion noted. Aorta: The aortic root is normal. In comparison to the previous echocardiogram(s): There are no prior studies on this patient for comparison purposes.  CONCLUSIONS:  1. The left ventricular systolic function is normal, with a Gonsalves's biplane calculated ejection fraction of 69%.  2. Spectral Doppler shows a  Grade I (impaired relaxation pattern) of left ventricular diastolic filling with normal left atrial filling pressure.  3. Mild to moderate mitral valve regurgitation.  4. Moderate aortic valve regurgitation. QUANTITATIVE DATA SUMMARY:  2D MEASUREMENTS:          Normal Ranges: LAs:             3.51 cm  (2.7-4.0cm) RVIDd:           1.75 cm  (0.9-3.6cm) IVSd:            1.18 cm  (0.6-1.1cm) LVPWd:           1.08 cm  (0.6-1.1cm) LVIDd:           3.44 cm  (3.9-5.9cm) LVIDs:           1.80 cm LV Mass Index:   76 g/m2 LVEDV Index:     29 ml/m2 LV % FS          47.7 %  LEFT ATRIUM:                  Normal Ranges: LA Vol A4C:        26.9 ml    (22+/-6mL/m2) LA Vol A2C:        40.7 ml LA Vol BP:         33.1 ml LA Vol Index A4C:  16.9ml/m2 LA Vol Index A2C:  25.5 ml/m2 LA Vol Index BP:   20.7 ml/m2 LA Area A4C:       12.2 cm2 LA Area A2C:       15.0 cm2 LA Major Axis A4C: 4.7 cm LA Major Axis A2C: 4.7 cm LA Vol A4C:        26.7 ml LA Vol A2C:        37.6 ml LA Vol Index BSA:  20.1 ml/m2  RIGHT ATRIUM:                 Normal Ranges: RA Vol A4C:        37.0 ml    (8.3-19.5ml) RA Vol Index A4C:  23.2 ml/m2 RA Area A4C:       14.9 cm2 RA Major Axis A4C: 5.1 cm  LV SYSTOLIC FUNCTION:                      Normal Ranges: EF-A4C View:    70 % (>=55%) EF-A2C View:    68 % EF-Biplane:     69 % LV EF Reported: 69 %  LV DIASTOLIC FUNCTION:             Normal Ranges: MV Peak E:             0.69 m/s    (0.7-1.2 m/s) MV Peak A:             0.79 m/s    (0.42-0.7 m/s) E/A Ratio:             0.88        (1.0-2.2) MV e'                  0.079 m/s   (>8.0) MV lateral e'          0.08 m/s MV medial e'           0.08 m/s MV A Dur:              129.40 msec E/e' Ratio:            8.72        (<8.0)  MITRAL VALVE:          Normal Ranges: MV DT:        227 msec (150-240msec)  AORTIC VALVE:           Normal Ranges: AoV Vmax:      1.38 m/s (<=1.7m/s) AoV Peak P.7 mmHg (<20mmHg) LVOT Max Dennis:  1.19 m/s (<=1.1m/s) LVOT VTI:      31.87 cm LVOT  Diameter: 1.80 cm  (1.8-2.4cm) AoV Area,Vmax: 2.19 cm2 (2.5-4.5cm2)  AORTIC INSUFFICIENCY: AI Vmax:       3.65 m/s AI Half-time:  553 msec AI Decel Time: 1906 msec AI Decel Rate: 191.45 cm/s2  RIGHT VENTRICLE: RV Basal 2.80 cm RV Major 5.6 cm TAPSE:   21.9 mm RV s'    0.14 m/s  TRICUSPID VALVE/RVSP:          Normal Ranges: Peak TR Velocity:     2.35 m/s Est. RA Pressure:     3 mmHg RV Syst Pressure:     25 mmHg  (< 30mmHg)  PULMONIC VALVE:          Normal Ranges: PV Accel Time:  171 msec (>120ms) PV Max Dennis:     0.8 m/s  (0.6-0.9m/s) PV Max P.6 mmHg  AORTA: Asc Ao Diam 3.14 cm  [id number] Gustabo Marrero MD Electronically signed on 2025 at 5:49:01 PM  ** Final **         Assessment and Recommendations     Assessment/Plan       1. Mitral valve insufficiency, unspecified etiology  Mild to moderate MR, ASX due to MVP. Echo annual  - Referral to Cardiology    2. Nonrheumatic aortic valve insufficiency (Primary)  Moderate AI due to sclerosis no stenosis.  ASX    3. MVP (mitral valve prolapse)  As above    4. Mixed hyperlipidemia  OK no statin due to CAC of 0.    Lauren Fields will return in 1 year for an office visit and Echo.              Gustabo Marrero MD    Exclusive of any other services or procedures performed, I, Gustabo Marrero MD , spent 30 minutes in duration for this visit today.  This time consisted of chart review, obtaining history, and/or performing the exam as documented above as well as documenting the clinical information for the encounter in the electronic record, discussing treatment options, plans, and/or goals with patient, family, and/or caregiver, refilling medications, updating the electronic record, ordering medicines, lab work, imaging, referrals, and/or procedures as documented above and communicating with other [facility] professionals. I have discussed the results of laboratory, radiology, and cardiology studies with the patient and their family/caregiver.

## 2025-03-10 PROBLEM — H93.299 ABNORMAL AUDITORY PERCEPTION: Status: ACTIVE | Noted: 2023-04-12

## 2025-03-10 PROBLEM — K57.32 DIVERTICULITIS OF COLON: Status: ACTIVE | Noted: 2023-04-12

## 2025-03-10 PROBLEM — M43.16 SPONDYLOLISTHESIS OF LUMBAR REGION: Status: ACTIVE | Noted: 2021-11-11

## 2025-03-10 PROBLEM — L03.119 CELLULITIS OF HAND: Status: RESOLVED | Noted: 2025-03-10 | Resolved: 2025-03-10

## 2025-03-10 PROBLEM — L60.8 NAIL DEFORMITY: Status: ACTIVE | Noted: 2024-02-16

## 2025-03-10 PROBLEM — H93.13 TINNITUS OF BOTH EARS: Status: ACTIVE | Noted: 2023-04-12

## 2025-03-10 PROBLEM — R82.81 PYURIA: Status: RESOLVED | Noted: 2025-03-10 | Resolved: 2025-03-10

## 2025-03-10 PROBLEM — M54.16 LUMBAR NERVE ROOT COMPRESSION: Status: ACTIVE | Noted: 2022-04-13

## 2025-03-10 PROBLEM — M54.16 LUMBAR NEURITIS: Status: ACTIVE | Noted: 2020-12-01

## 2025-03-10 PROBLEM — Z86.19 HISTORY OF VIRAL INFECTION: Status: RESOLVED | Noted: 2025-03-10 | Resolved: 2025-03-10

## 2025-03-10 PROBLEM — L82.1 OTHER SEBORRHEIC KERATOSIS: Status: ACTIVE | Noted: 2020-09-22

## 2025-03-10 PROBLEM — M25.559 ARTHRALGIA OF HIP: Status: RESOLVED | Noted: 2024-01-25 | Resolved: 2025-03-10

## 2025-03-10 PROBLEM — M25.551 HIP PAIN, RIGHT: Status: RESOLVED | Noted: 2023-04-27 | Resolved: 2025-03-10

## 2025-03-10 PROBLEM — R82.998 LEUKOCYTES IN URINE: Status: RESOLVED | Noted: 2025-03-10 | Resolved: 2025-03-10

## 2025-03-10 PROBLEM — M12.9 ARTHROPATHY: Status: ACTIVE | Noted: 2021-11-10

## 2025-03-10 PROBLEM — S93.602A SPRAIN OF LEFT FOOT: Status: RESOLVED | Noted: 2022-03-24 | Resolved: 2025-03-10

## 2025-03-10 PROBLEM — M43.16 LUMBAR ADJACENT SEGMENT DISEASE WITH SPONDYLOLISTHESIS: Status: ACTIVE | Noted: 2023-05-19

## 2025-03-10 PROBLEM — R07.89 CHEST WALL PAIN: Status: RESOLVED | Noted: 2025-03-10 | Resolved: 2025-03-10

## 2025-03-10 PROBLEM — G89.29 CHRONIC SACROILIAC PAIN: Status: ACTIVE | Noted: 2023-10-01

## 2025-03-10 PROBLEM — S00.31XA ABRASION OF NOSE: Status: RESOLVED | Noted: 2025-03-10 | Resolved: 2025-03-10

## 2025-03-10 PROBLEM — H61.001 UNSPECIFIED PERICHONDRITIS OF RIGHT EXTERNAL EAR: Status: RESOLVED | Noted: 2020-09-22 | Resolved: 2025-03-10

## 2025-03-10 PROBLEM — M76.72 PERONEAL TENDINITIS OF LEFT LOWER EXTREMITY: Status: ACTIVE | Noted: 2023-03-27

## 2025-03-10 PROBLEM — M25.579 ACUTE ANKLE PAIN: Status: RESOLVED | Noted: 2025-03-10 | Resolved: 2025-03-10

## 2025-03-10 PROBLEM — M51.369 LUMBAR ADJACENT SEGMENT DISEASE WITH SPONDYLOLISTHESIS: Status: ACTIVE | Noted: 2023-05-19

## 2025-03-10 PROBLEM — L60.0 INGROWING NAIL: Status: RESOLVED | Noted: 2022-03-24 | Resolved: 2025-03-10

## 2025-03-10 PROBLEM — J06.9 UPPER RESPIRATORY INFECTION: Status: RESOLVED | Noted: 2025-03-10 | Resolved: 2025-03-10

## 2025-03-10 PROBLEM — M53.3 CHRONIC SACROILIAC PAIN: Status: ACTIVE | Noted: 2023-10-01

## 2025-03-10 PROBLEM — N95.1 VAGINAL DRYNESS, MENOPAUSAL: Status: ACTIVE | Noted: 2025-03-10

## 2025-03-10 PROBLEM — D18.01 HEMANGIOMA OF SKIN AND SUBCUTANEOUS TISSUE: Status: RESOLVED | Noted: 2020-09-22 | Resolved: 2025-03-10

## 2025-03-10 PROBLEM — S80.212A ABRASION OF LEFT KNEE: Status: RESOLVED | Noted: 2025-03-10 | Resolved: 2025-03-10

## 2025-03-10 PROBLEM — M25.351 INSTABILITY OF RIGHT HIP JOINT: Status: ACTIVE | Noted: 2024-07-15

## 2025-03-10 PROBLEM — M18.12 ARTHRITIS OF CARPOMETACARPAL (CMC) JOINT OF LEFT THUMB: Status: ACTIVE | Noted: 2017-01-01

## 2025-03-10 PROBLEM — M19.019 ACROMIOCLAVICULAR JOINT ARTHRITIS: Status: ACTIVE | Noted: 2022-01-01

## 2025-03-10 PROBLEM — M54.50 LOW BACK PAIN, UNSPECIFIED: Status: RESOLVED | Noted: 2024-12-04 | Resolved: 2025-03-10

## 2025-03-10 PROBLEM — B00.9 HERPES SIMPLEX VIRUS (HSV) INFECTION: Status: ACTIVE | Noted: 2024-07-15

## 2025-03-10 PROBLEM — G56.20 ULNAR NERVE COMPRESSION: Status: ACTIVE | Noted: 2025-03-10

## 2025-03-10 PROBLEM — A60.00 GENITAL HERPES SIMPLEX: Status: ACTIVE | Noted: 2023-04-12

## 2025-03-10 PROBLEM — M62.838 MUSCLE SPASMS OF LOWER EXTREMITY: Status: RESOLVED | Noted: 2024-01-15 | Resolved: 2025-03-10

## 2025-03-10 PROBLEM — R07.81 RIB PAIN ON RIGHT SIDE: Status: RESOLVED | Noted: 2025-03-10 | Resolved: 2025-03-10

## 2025-03-10 PROBLEM — K56.7 ILEUS, UNSPECIFIED: Status: ACTIVE | Noted: 2024-01-01

## 2025-03-10 PROBLEM — D75.89 MACROCYTOSIS: Status: RESOLVED | Noted: 2023-04-27 | Resolved: 2025-03-10

## 2025-03-11 ENCOUNTER — OFFICE VISIT (OUTPATIENT)
Dept: PRIMARY CARE | Facility: CLINIC | Age: 75
End: 2025-03-11
Payer: MEDICARE

## 2025-03-11 VITALS
DIASTOLIC BLOOD PRESSURE: 60 MMHG | SYSTOLIC BLOOD PRESSURE: 110 MMHG | TEMPERATURE: 97.7 F | OXYGEN SATURATION: 97 % | BODY MASS INDEX: 24.14 KG/M2 | WEIGHT: 132 LBS | HEART RATE: 65 BPM

## 2025-03-11 DIAGNOSIS — J06.9 VIRAL UPPER RESPIRATORY ILLNESS: Primary | ICD-10-CM

## 2025-03-11 DIAGNOSIS — R05.1 ACUTE COUGH: ICD-10-CM

## 2025-03-11 PROCEDURE — 1157F ADVNC CARE PLAN IN RCRD: CPT | Performed by: NURSE PRACTITIONER

## 2025-03-11 PROCEDURE — 1160F RVW MEDS BY RX/DR IN RCRD: CPT | Performed by: NURSE PRACTITIONER

## 2025-03-11 PROCEDURE — 1159F MED LIST DOCD IN RCRD: CPT | Performed by: NURSE PRACTITIONER

## 2025-03-11 PROCEDURE — 99213 OFFICE O/P EST LOW 20 MIN: CPT | Performed by: NURSE PRACTITIONER

## 2025-03-11 PROCEDURE — 1036F TOBACCO NON-USER: CPT | Performed by: NURSE PRACTITIONER

## 2025-03-11 PROCEDURE — 1123F ACP DISCUSS/DSCN MKR DOCD: CPT | Performed by: NURSE PRACTITIONER

## 2025-03-11 RX ORDER — AZITHROMYCIN 250 MG/1
TABLET, FILM COATED ORAL
Qty: 6 TABLET | Refills: 0 | Status: SHIPPED | OUTPATIENT
Start: 2025-03-11 | End: 2025-03-16

## 2025-03-11 RX ORDER — BENZONATATE 200 MG/1
200 CAPSULE ORAL 3 TIMES DAILY PRN
Qty: 20 CAPSULE | Refills: 0 | Status: SHIPPED | OUTPATIENT
Start: 2025-03-11 | End: 2025-03-18

## 2025-03-11 ASSESSMENT — ENCOUNTER SYMPTOMS
CONSTITUTIONAL NEGATIVE: 1
CHEST TIGHTNESS: 0
MUSCULOSKELETAL NEGATIVE: 1
SHORTNESS OF BREATH: 0
WHEEZING: 0
NEUROLOGICAL NEGATIVE: 1
PSYCHIATRIC NEGATIVE: 1
COUGH: 1
CARDIOVASCULAR NEGATIVE: 1

## 2025-03-11 NOTE — PATIENT INSTRUCTIONS
Use the tessalon for the cough as needed.   If cough does not continue to subside can start the antibiotic in 2-3 days.   Follow up in 45- days if no better.

## 2025-03-11 NOTE — PROGRESS NOTES
Subjective   Patient ID: Lauren Fields is a 75 y.o. female who presents for URI (X 1 weeks/Chest congestion with cough).    HPI Presents today with 1 week history of  not feeling well.   Started with scratchy throat and then nasal congestion kicked int he next day.    Chills at the beginning.  Was getting better then yesterday she loss her voice and started coughing more. Today better again/  did cough a lot last night  Been taking zyrtec and sudafed  Cough drops for the  cough. Been drinking warm apple juice and warm water with honey and lemon.     Review of Systems   Constitutional: Negative.    HENT:          As noted in HPI     Respiratory:  Positive for cough. Negative for chest tightness, shortness of breath and wheezing.    Cardiovascular: Negative.    Musculoskeletal: Negative.    Neurological: Negative.    Psychiatric/Behavioral: Negative.         Objective   /60   Pulse 65   Temp 36.5 °C (97.7 °F)   Wt 59.9 kg (132 lb)   SpO2 97%   BMI 24.14 kg/m²     Physical Exam  Constitutional:       Appearance: Normal appearance.   HENT:      Right Ear: Tympanic membrane, ear canal and external ear normal.      Left Ear: Tympanic membrane, ear canal and external ear normal.      Mouth/Throat:      Mouth: Mucous membranes are moist.      Pharynx: Oropharynx is clear.   Cardiovascular:      Rate and Rhythm: Normal rate and regular rhythm.   Pulmonary:      Effort: Pulmonary effort is normal.      Breath sounds: Normal breath sounds.   Musculoskeletal:         General: Normal range of motion.   Neurological:      General: No focal deficit present.      Mental Status: She is alert.   Psychiatric:         Mood and Affect: Mood normal.         Behavior: Behavior normal.         Assessment/Plan   Problem List Items Addressed This Visit    None  Visit Diagnoses         Codes    Viral upper respiratory illness    -  Primary J06.9    Acute cough     R05.1    Relevant Medications    benzonatate (Tessalon) 200 mg  capsule    azithromycin (Zithromax) 250 mg tablet

## 2025-03-14 ENCOUNTER — TREATMENT (OUTPATIENT)
Dept: PHYSICAL THERAPY | Facility: CLINIC | Age: 75
End: 2025-03-14
Payer: MEDICARE

## 2025-03-14 DIAGNOSIS — M54.17 LUMBOSACRAL NEURITIS: Primary | ICD-10-CM

## 2025-03-14 PROCEDURE — 97140 MANUAL THERAPY 1/> REGIONS: CPT | Mod: GP

## 2025-03-14 NOTE — PROGRESS NOTES
Physical Therapy Treatment Note     Patient Name: Lauren Fields  MRN: [id number]  Today's Date: 3/14/2025     Time Calculation  Start Time: 1100  Stop Time: 1145  Time Calculation (min): 45 min  PT Therapeutic Procedures Time Entry  Manual Therapy Time Entry: 45    Visit: 35 total, 11/MN for lumbar  Referred by:   Alcides Meza MD     Insurance:  [employer]   Certification Dates:  11-22-24 to 4-15-25    Current Problem: M54.17, Lumbar Neuritis  Problem List Items Addressed This Visit             ICD-10-CM       Neuro    Lumbosacral neuritis - Primary M54.17     1. Lumbosacral neuritis  Follow Up In Physical Therapy               HPI:  Long history of degenerative back pain, h/o cervical & lumbar fusions    Subjective:  Pt stated she has been all week with an URI.  She is on the last 2 days of antibiotics.  She has not done much stretching, she feels tight through the Lt lumbar & Lt hip areas today.      Patient's Living Environment/PLOF: Independent with ADL's, has to modify or not perform lifting, carrying due to LBP, lives alone  Patient's Therapy Goals: To decrease pain, return to PLOF for ADL's, hiking, biking for health benefits      Pain:  Intensity:  2-3/10, surgical  (not recent)            Location: posterior, aching, Lt SIJ            Duration: constant            Aggravating Factor: Prolonged sitting, bending, carrying, lifting, sleeping, standing            Alleviating Factor:  Lyrica is no longer helping, trying to sleep            Clinical Progression: Gradually worsening      Social Factors:  No personal factors &/2-3 comorbidities     Precautions:  Hx cervical disectomy & fusion C3-C6 10/2014, cervical fusion 8/2015, lumbar laminectomy & fusion L4, L5 11/2021, fusion L3L4, Rt RTCR 3/2022     Objective     Palpation: + tenderness lower lumbar segments on the Lt, Lt glute med, ITB & Lt peroneals    Dermatomal Impairment: L5, S1, S1       HEP Compliance:  100%    Gait:  Grossly WNL's,  starts to deteriorate with prolonged WB    Outcome Measures:  ADAMARIS:  40%    XR Lumbar:  11-19-24:  7 views    Laminectomy & posterior spinal fusion at L3-L5. The hardware is intact. There is no fracture or spondylolisthesis. No significant degenerative change the remainder of the lumbar spine.  Posterior fusion L3-L5 with intact hardware. No evidence of malalignment.      MRI Lumbar Spine with and without contrast:      T12/L1:  There is no significant disc bulge/protrusion, neuroforaminal stenosis, or central canal stenosis.   L1/L2:  Moderate facet and ligamentum flavum hypertrophy. Mild (B) neuroforaminal narrowing.    L2/L3:  Severe facet and ligamentum flavum [id number]. Mild (B) neuroforaminal narrowing.       Treatments:    Manual:  STM through (Lt) glute med, ITB, proximal to distal, inferior glides, long axis distraction Lt LE, PA mobs L3-L5, (B) to improve mobility, STM through Lt calf    Assessment:     Pt stated she has been better overall with acupuncture, massage, and manual in PT.  She is doing everything she can in preparation to her upcoming trip to [place] & lots of walking.  Manual performed as noted above.  Decreased symptoms post treastment.      Plan: Continue per POC & pt tolerance.  Frequency/duration: 1-2x/wk for 8 visits.

## 2025-03-25 ENCOUNTER — HOSPITAL ENCOUNTER (OUTPATIENT)
Dept: RADIOLOGY | Facility: CLINIC | Age: 75
Discharge: HOME | End: 2025-03-25
Payer: MEDICARE

## 2025-03-25 ENCOUNTER — APPOINTMENT (OUTPATIENT)
Dept: ORTHOPEDIC SURGERY | Facility: CLINIC | Age: 75
End: 2025-03-25
Payer: MEDICARE

## 2025-03-25 VITALS — HEIGHT: 62 IN | BODY MASS INDEX: 23.92 KG/M2 | WEIGHT: 130 LBS

## 2025-03-25 DIAGNOSIS — M16.11 ARTHRITIS OF RIGHT HIP: ICD-10-CM

## 2025-03-25 PROCEDURE — 1036F TOBACCO NON-USER: CPT | Performed by: ORTHOPAEDIC SURGERY

## 2025-03-25 PROCEDURE — 1159F MED LIST DOCD IN RCRD: CPT | Performed by: ORTHOPAEDIC SURGERY

## 2025-03-25 PROCEDURE — 73502 X-RAY EXAM HIP UNI 2-3 VIEWS: CPT | Mod: RT

## 2025-03-25 PROCEDURE — 99213 OFFICE O/P EST LOW 20 MIN: CPT | Performed by: ORTHOPAEDIC SURGERY

## 2025-03-25 PROCEDURE — 1157F ADVNC CARE PLAN IN RCRD: CPT | Performed by: ORTHOPAEDIC SURGERY

## 2025-03-25 PROCEDURE — 1123F ACP DISCUSS/DSCN MKR DOCD: CPT | Performed by: ORTHOPAEDIC SURGERY

## 2025-03-25 ASSESSMENT — PAIN - FUNCTIONAL ASSESSMENT: PAIN_FUNCTIONAL_ASSESSMENT: NO/DENIES PAIN

## 2025-03-25 NOTE — PROGRESS NOTES
ORTHOPEDIC TOTAL JOINT  POST-OPERATIVE FOLLOW UP      ============================  IMPRESSION/PLAN:  ============================  75 y.o. female s/p Right Total Hip Replacement completed on 03/29/2024.    PLAN:  -Weightbearing: Weight bearing as tolerated  -DVT Prophylaxis: No longer needed  -Radiology Studies:  x-rays from today reviewed with the patient  -Therapies: Continue regular exercise program   -Follow-up: as needed        Lauren Fields presents today for follow up of joint replacement above. Pain controlled with current treatment plan. Patient has completed physical therapy and are doing exercises on their own. They are currently ambulating with  no assistance .  Overall, they are doing well with the right hip. No concerns with the right hip.They have questions concerning abx's with dental procedures or with piercing their ears, or on if the pain in their back could be from their left hip. XR done today.    Review of Systems:   Constitutional: See HPI for pain assessment, No significant weight loss, recent trauma. Denies fevers/chills  Cardiovascular: No chest pain, shortness of breath  Respiratory: No difficulty breathing, cough  Gastrointestinal: No nausea, vomiting, diarrhea, constipation  Musculoskeletal: Noted in HPI, no arthralgias   Integumentary: No rashes, easy bruising, redness   Neurological: no numbness or tingling in extremities, no gait disturbances     Patient Active Problem List   Diagnosis    Hyperlipidemia    Osteopenia    Asymmetrical right sensorineural hearing loss    Anemia    Abnormal mammogram    Sacral fracture (Multi)    Sciatica    Vitamin D deficiency    Iliotibial band syndrome of right side    Tendinopathy of right gluteus medius    Trochanteric bursitis, right hip    Impaired fasting blood sugar    Primary osteoarthritis of right hip    Status post total replacement of right hip    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)    Lumbosacral neuritis     Osteoporosis    Mitral valve regurgitation    Nonrheumatic aortic valve insufficiency    MVP (mitral valve prolapse)    Abnormal auditory perception    Acromioclavicular joint arthritis    Arthritis of carpometacarpal (CMC) joint of left thumb    Arthropathy    Chronic sacroiliac pain    Diverticulitis of colon    Genital herpes simplex    Herpes simplex virus (HSV) infection    Ileus, unspecified    Instability of right hip joint    Lumbar adjacent segment disease with spondylolisthesis    Spondylolisthesis of lumbar region    Lumbar neuritis    Nail deformity    Other seborrheic keratosis    Peroneal tendinitis of left lower extremity    Lumbar nerve root compression    Spinal stenosis of cervical region    Spinal stenosis of lumbar region with neurogenic claudication    Tinnitus of both ears    Ulnar nerve compression    Ulnar nerve impingement, left    Vaginal dryness, menopausal       =================================  EXAM  =================================  GENERAL: A/Ox3, NAD. Appears healthy, well nourished  CARDIAC: regular rate  LUNGS: Breathing non-labored    MUSCULOSKELETAL:  Laterality: right Hip exam  - Strength: Abduction 5/5, Flexion 5/5  - Palpation: non TTP along surgical site  - Incision: No overlying, erythema, induration, or drainage. Incision healing well with no wound dehiscence  - EHL/PF/DF motor intact  - compartments soft, negative homans  - Gait: using no assistive device    NEUROVASCULAR:  - Neurovascular Status: sensation intact to light touch distally  - Capillary refill brisk at extremities, Bilateral dorsalis pedis pulse 2+      IMAGING:  Multi views right hip and pelvis reviewed which demonstrates no signs of loosening or failure right total hip arthroplasty X-rays were personally reviewed by me.  Radiology reports were reviewed by me as well, if available at the time.        Tyler Salazar DO  Attending Surgeon  Joint Replacement and Adult Reconstructive Surgery  [place]  [facility]- [place], [place]

## 2025-03-28 ENCOUNTER — TELEPHONE (OUTPATIENT)
Dept: PRIMARY CARE | Facility: CLINIC | Age: 75
End: 2025-03-28
Payer: MEDICARE

## 2025-03-28 DIAGNOSIS — M54.30 SCIATICA, UNSPECIFIED LATERALITY: ICD-10-CM

## 2025-03-28 DIAGNOSIS — M54.17 LUMBOSACRAL NEURITIS: ICD-10-CM

## 2025-03-28 NOTE — TELEPHONE ENCOUNTER
Pt called, lvm, she would like a order for acupuncture for lower back pain. Pt insurance requires order to cover visits.

## 2025-04-01 ENCOUNTER — TREATMENT (OUTPATIENT)
Dept: PHYSICAL THERAPY | Facility: CLINIC | Age: 75
End: 2025-04-01
Payer: MEDICARE

## 2025-04-01 DIAGNOSIS — M54.17 LUMBOSACRAL NEURITIS: ICD-10-CM

## 2025-04-01 PROCEDURE — 97140 MANUAL THERAPY 1/> REGIONS: CPT | Mod: GP

## 2025-04-01 NOTE — PROGRESS NOTES
Physical Therapy Treatment Note     Patient Name: Lauren Fields  MRN: [id number]  Today's Date: 4/1/2025     Time Calculation  Start Time: 1400  Stop Time: 1445  Time Calculation (min): 45 min  PT Therapeutic Procedures Time Entry  Manual Therapy Time Entry: 45    Visit: 36 total, 11/MN for lumbar  Referred by:   Alcides Meza MD     Insurance:  [employer]   Certification Dates:  11-22-24 to 4-15-25    Current Problem: M54.17, Lumbar Neuritis  Problem List Items Addressed This Visit             ICD-10-CM       Neuro    Lumbosacral neuritis M54.17       1. Lumbosacral neuritis  Follow Up In Physical Therapy                 HPI:  Long history of degenerative back pain, h/o cervical & lumbar fusions    Subjective:  Pt stated she has been all week with an URI.  She is on the last 2 days of antibiotics.  She has not done much stretching, she feels tight through the Lt lumbar & Lt hip areas today.      Patient's Living Environment/PLOF: Independent with ADL's, has to modify or not perform lifting, carrying due to LBP, lives alone  Patient's Therapy Goals: To decrease pain, return to PLOF for ADL's, hiking, biking for health benefits      Pain:  Intensity:  2-3/10, surgical  (not recent)            Location: posterior, aching, Lt SIJ            Duration: constant            Aggravating Factor: Prolonged sitting, bending, carrying, lifting, sleeping, standing            Alleviating Factor:  Lyrica is no longer helping, trying to sleep            Clinical Progression: Gradually worsening      Social Factors:  No personal factors &/2-3 comorbidities     Precautions:  Hx cervical disectomy & fusion C3-C6 10/2014, cervical fusion 8/2015, lumbar laminectomy & fusion L4, L5 11/2021, fusion L3L4, Rt RTCR 3/2022     Objective     Palpation: + tenderness Lt glute med, SIJ, ITB    Dermatomal Impairment: L5, S1, S1       HEP Compliance:  100%    Gait:  Grossly WNL's, starts to deteriorate with prolonged WB    Outcome  Measures:  ADAMARIS:  40%    XR Lumbar:  11-19-24:  7 views    Laminectomy & posterior spinal fusion at L3-L5. The hardware is intact. There is no fracture or spondylolisthesis. No significant degenerative change the remainder of the lumbar spine.  Posterior fusion L3-L5 with intact hardware. No evidence of malalignment.      MRI Lumbar Spine with and without contrast:      T12/L1:  There is no significant disc bulge/protrusion, neuroforaminal stenosis, or central canal stenosis.   L1/L2:  Moderate facet and ligamentum flavum hypertrophy. Mild (B) neuroforaminal narrowing.    L2/L3:  Severe facet and ligamentum flavum [id number]. Mild (B) neuroforaminal narrowing.       Treatments:    Manual:  STM through (Lt) glute med, ITB, proximal to distal, inferior glides, long axis distraction Lt LE, PA mobs L3-L5, (B) to improve mobility, STM through Lt calf    Assessment:     Pt stated she continues to improve with a combined approach to include acupuncture, massage, and manual in PT.  She is doing everything she can in preparation to her upcoming trip to [place] 4-28-25.  Manual performed as noted above.  Decreased symptoms post treastment.      Plan: Continue per POC & pt tolerance.  Frequency/duration: 1-2x/wk for 8 visits.

## 2025-04-15 ENCOUNTER — TREATMENT (OUTPATIENT)
Dept: PHYSICAL THERAPY | Facility: CLINIC | Age: 75
End: 2025-04-15
Payer: MEDICARE

## 2025-04-15 DIAGNOSIS — M54.17 LUMBOSACRAL NEURITIS: Primary | ICD-10-CM

## 2025-04-15 PROCEDURE — 97140 MANUAL THERAPY 1/> REGIONS: CPT | Mod: GP

## 2025-04-15 NOTE — PROGRESS NOTES
Physical Therapy Treatment Note     Patient Name: Lauren Fields  MRN: [id number]  Today's Date: 4/15/2025     Time Calculation  Start Time: 1400  Stop Time: 1445  Time Calculation (min): 45 min  PT Therapeutic Procedures Time Entry  Manual Therapy Time Entry: 45    Visit: 37 total, 11/MN for lumbar (6 this year)  Referred by:   Alcides Meza MD     Insurance:  [employer]   Certification Dates:  11-22-24 to 4-15-25    Current Problem: M54.17, Lumbar Neuritis  Problem List Items Addressed This Visit             ICD-10-CM       Neuro    Lumbosacral neuritis - Primary M54.17     1. Lumbosacral neuritis  Follow Up In Physical Therapy               HPI:  Long history of degenerative back pain, h/o cervical & lumbar fusions    Subjective:  Pt stated she has been doing better since consistent acupuncture.  She is now weaned down to 1 x month.  The Rt hip has been bothering her recently.  She leaves for [place] in 13 days.      Patient's Living Environment/PLOF: Independent with ADL's, has to modify or not perform lifting, carrying due to LBP, lives alone  Patient's Therapy Goals: To decrease pain, return to PLOF for ADL's, hiking, biking for health benefits      Pain:  Intensity:  2/10, surgical  (not recent)            Location: posterior, aching, Lt SIJ            Duration: constant            Aggravating Factor: Prolonged sitting, bending, carrying, lifting, sleeping, standing            Alleviating Factor:  Lyrica is no longer helping, trying to sleep            Clinical Progression: Gradually worsening      Social Factors:  No personal factors &/2-3 comorbidities     Precautions:  Hx cervical disectomy & fusion C3-C6 10/2014, cervical fusion 8/2015, lumbar laminectomy & fusion L4, L5 11/2021, fusion L3L4, Rt RTCR 3/2022     Objective     Palpation: + tenderness Lt glute med, SIJ, ITB    Dermatomal Impairment: L5, S1, S1       HEP Compliance:  100%    Gait:  Grossly WNL's, starts to deteriorate with prolonged  WB    Outcome Measures:  ADAMARIS:  40%    XR Lumbar:  11-19-24:  7 views    Laminectomy & posterior spinal fusion at L3-L5. The hardware is intact. There is no fracture or spondylolisthesis. No significant degenerative change the remainder of the lumbar spine.  Posterior fusion L3-L5 with intact hardware. No evidence of malalignment.      MRI Lumbar Spine with and without contrast:      T12/L1:  There is no significant disc bulge/protrusion, neuroforaminal stenosis, or central canal stenosis.   L1/L2:  Moderate facet and ligamentum flavum hypertrophy. Mild (B) neuroforaminal narrowing.    L2/L3:  Severe facet and ligamentum flavum [id number]. Mild (B) neuroforaminal narrowing.       Treatments:    Manual:  STM through (Rt) glute med, ITB, proximal to distal, inferior glides, long axis distraction Lt LE, PA mobs L3-L5, (B) to improve mobility, STM through Lt glute med, ITB (very lumpy), prone IR/ER.    Assessment:     Pt has follow up for Pain Management this Thursday.  She is happy with progress to date & believes this might be her last visit.  Manual performed as noted above.  Rt hip stiff with ER/IR.  Decreased symptoms post treatment.      Plan: Continue per POC & pt tolerance.  Frequency/duration: 1-2x/wk for 8 visits.

## 2025-04-25 DIAGNOSIS — M16.11 ARTHRITIS OF RIGHT HIP: Primary | ICD-10-CM

## 2025-04-25 RX ORDER — CLINDAMYCIN HYDROCHLORIDE 300 MG/1
600 CAPSULE ORAL ONCE
Qty: 10 CAPSULE | Refills: 0 | Status: SHIPPED | OUTPATIENT
Start: 2025-04-25 | End: 2025-04-25

## 2025-06-24 ENCOUNTER — DOCUMENTATION (OUTPATIENT)
Dept: PHYSICAL THERAPY | Facility: CLINIC | Age: 75
End: 2025-06-24
Payer: MEDICARE

## 2025-06-24 NOTE — PROGRESS NOTES
Physical Therapy DC Note        Name: Lauren Fields  MRN: [id number]  : 1950  Date: 25    Discharge Summary: PT    Discharge Information: Date of evaluation 24    Therapy Summary: Pt seen for 13 visits for lumbosacral neuritis.  She was doing well at her last visit.  Pain was significantly resolved btwn PT & acupuncture.        Rehab Discharge Reason: Achieved all and/or the most significant goals(s)

## 2025-07-10 ENCOUNTER — APPOINTMENT (OUTPATIENT)
Dept: PRIMARY CARE | Facility: CLINIC | Age: 75
End: 2025-07-10
Payer: MEDICARE

## 2025-07-10 VITALS
SYSTOLIC BLOOD PRESSURE: 110 MMHG | HEIGHT: 62 IN | BODY MASS INDEX: 23.96 KG/M2 | WEIGHT: 130.2 LBS | DIASTOLIC BLOOD PRESSURE: 68 MMHG

## 2025-07-10 DIAGNOSIS — D12.6 ADENOMATOUS POLYP OF COLON, UNSPECIFIED PART OF COLON: ICD-10-CM

## 2025-07-10 DIAGNOSIS — B00.9 HSV INFECTION: Primary | ICD-10-CM

## 2025-07-10 PROCEDURE — 1159F MED LIST DOCD IN RCRD: CPT | Performed by: INTERNAL MEDICINE

## 2025-07-10 PROCEDURE — 1170F FXNL STATUS ASSESSED: CPT | Performed by: INTERNAL MEDICINE

## 2025-07-10 PROCEDURE — G0439 PPPS, SUBSEQ VISIT: HCPCS | Performed by: INTERNAL MEDICINE

## 2025-07-10 PROCEDURE — 1160F RVW MEDS BY RX/DR IN RCRD: CPT | Performed by: INTERNAL MEDICINE

## 2025-07-10 PROCEDURE — 1036F TOBACCO NON-USER: CPT | Performed by: INTERNAL MEDICINE

## 2025-07-10 ASSESSMENT — ACTIVITIES OF DAILY LIVING (ADL)
TAKING_MEDICATION: INDEPENDENT
MANAGING_FINANCES: INDEPENDENT
BATHING: INDEPENDENT
DRESSING: INDEPENDENT
DOING_HOUSEWORK: INDEPENDENT
GROCERY_SHOPPING: INDEPENDENT

## 2025-07-10 ASSESSMENT — PATIENT HEALTH QUESTIONNAIRE - PHQ9
2. FEELING DOWN, DEPRESSED OR HOPELESS: NOT AT ALL
1. LITTLE INTEREST OR PLEASURE IN DOING THINGS: NOT AT ALL
SUM OF ALL RESPONSES TO PHQ9 QUESTIONS 1 AND 2: 0
2. FEELING DOWN, DEPRESSED OR HOPELESS: NOT AT ALL
1. LITTLE INTEREST OR PLEASURE IN DOING THINGS: NOT AT ALL
SUM OF ALL RESPONSES TO PHQ9 QUESTIONS 1 AND 2: 0

## 2025-07-10 NOTE — PROGRESS NOTES
"Subjective   Reason for Visit: Lauren Fields is an 75 y.o. female here for a Medicare Wellness visit.     Past Medical, Surgical, and Family History reviewed and updated in chart.    Reviewed all medications by prescribing practitioner or clinical pharmacist (such as prescriptions, OTCs, herbal therapies and supplements) and documented in the medical record.    HPI    +exercise  Working w/ painMD for ongoing LS issues.   Diet good     Patient Care Team:  Ann Morgan MD as PCP - General  Ann Morgan MD as PCP - [facility] PCP  Naomie Philippe RN as Registered Nurse (Orthopaedic Surgery)  Ambreen Ortega RN as Registered Nurse (Orthopaedic Surgery)     Review of Systems    Objective   Vitals:  /68 (BP Location: Left arm, Patient Position: Sitting)   Ht 1.575 m (5' 2\")   Wt 59.1 kg (130 lb 3.2 oz)   BMI 23.81 kg/m²       Physical Exam  Constitutional:       General: She is not in acute distress.     Appearance: Normal appearance. She is not ill-appearing or toxic-appearing.   HENT:      Head: Normocephalic and atraumatic.   Cardiovascular:      Rate and Rhythm: Normal rate and regular rhythm.      Heart sounds: No murmur heard.  Pulmonary:      Effort: Pulmonary effort is normal.      Breath sounds: Normal breath sounds. No wheezing or rales.   Neurological:      Mental Status: She is alert.   Psychiatric:         Mood and Affect: Mood normal.         Behavior: Behavior normal.         Judgment: Judgment normal.             Lab Results   Component Value Date    WBC 4.9 12/12/2024    HGB 13.3 12/12/2024    HCT 40.4 12/12/2024     12/12/2024    CHOL 250 (H) 12/12/2024    TRIG 88 12/12/2024    HDL 93.0 12/12/2024    ALT 19 12/12/2024    AST 21 02/11/2019     12/12/2024    K 4.5 12/12/2024     12/12/2024    CREATININE 0.85 12/12/2024    BUN 16 12/12/2024    CO2 30 12/12/2024    TSH 1.01 12/12/2024    HGBA1C 5.7 (H) 12/12/2024       Assessment & Plan  HSV infection     "     Adenomatous polyp of colon, unspecified part of colon    Orders:    Referral to Gastroenterology; Future              #1 HSV- stable.    #2 lumbar spine disease/djd (hip)-follow-up orthopedist/pain medicine  #3 vitamin D deficiency-continue.    #4 osteoporosis - on rx per rheum, f/u  rheum.   #5 hyperlipidemia-elevated LDL.  ASCVD risk score~11%. CACS=0.  +fhx CAD.  Reviewed pros and cons of treatment.  declines  #6 rt RC- good  #7 hip pain- remians an issue.  f/u  painMD/ortho  #8 colon polyps- last 2020. f/u 2025--> reviewed. ordered  #9 impaired fasting blood sugar-   #10 mitral/aortic dz- f/u  cards.

## (undated) DEVICE — Device

## (undated) DEVICE — ELECTRODE, ELECTROSURGICAL, BLADE, EXTENDED

## (undated) DEVICE — TRACKING KIT, HIP PROCEDURES, VIZADISC

## (undated) DEVICE — GLOVE, SURGICAL, PROTEXIS PI , 7.5, PF, LF

## (undated) DEVICE — DRAPE, INCISE, ANTIMICROBIAL, IOBAN 2, STERI DRAPE, 23 X 33 IN, DISPOSABLE, STERILE

## (undated) DEVICE — CATHETER TRAY, SURESTEP, 14FR, PRECONNECTED DRAIN BAG, PVC

## (undated) DEVICE — DRAPE, SHEET, 17 X 23 IN

## (undated) DEVICE — BLADE, SAW, OSC TIP, FALCON, 19.5 X 1.28 X 90MM

## (undated) DEVICE — IRRIGATION SYSTEM, WOUND, PULSAVAC PLUS

## (undated) DEVICE — TIBIAL CHECKPOINT, STERILE

## (undated) DEVICE — STRYKER RIO DRAPE KIT (FORMERLY MFR'D BY MAKO)

## (undated) DEVICE — GLOVE, SURGICAL, PROTEXIS PI BLUE W/NEUTHERA, 8.0, PF, LF

## (undated) DEVICE — HOOD, SURGICAL, FLYTE HYBRID

## (undated) DEVICE — SYRINGE, 50 CC, LUER LOCK

## (undated) DEVICE — BANDAGE, COFLEX, 6 X 5 YDS, TAN, STERILE, LF

## (undated) DEVICE — DRAPE, SHEET, U, STERI DRAPE, 47 X 51 IN, DISPOSABLE, STERILE

## (undated) DEVICE — DRAPE, INSTRUMENT, W/POUCH, STERI DRAPE, 7 X 11 IN, DISPOSABLE, STERILE

## (undated) DEVICE — SUTURE, VICRYL, 1, 36 IN, CT-1, UNDYED

## (undated) DEVICE — SOLUTION, POVIDONE IODINE 10%, 0.75 OZ, NS

## (undated) DEVICE — SOLUTION, INJECTION, SODIUM CHLORIDE 9%, 500ML

## (undated) DEVICE — SUTURE, VICRYL, 2-0, 18 IN CP-2, UNDYED

## (undated) DEVICE — TUBING, SUCTION, 6MM X 10, CLEAN N-COND

## (undated) DEVICE — CATHETER, SUCTION, CATH-N-GLOVE, PEEL POUCH, 18 FR